# Patient Record
Sex: MALE | Race: WHITE | Employment: OTHER | URBAN - METROPOLITAN AREA
[De-identification: names, ages, dates, MRNs, and addresses within clinical notes are randomized per-mention and may not be internally consistent; named-entity substitution may affect disease eponyms.]

---

## 2017-04-21 ENCOUNTER — HOSPITAL ENCOUNTER (OUTPATIENT)
Dept: PET IMAGING | Age: 82
Discharge: HOME | End: 2017-04-21
Attending: INTERNAL MEDICINE
Payer: MEDICARE

## 2017-04-21 VITALS — HEIGHT: 68 IN | WEIGHT: 173 LBS | BODY MASS INDEX: 26.22 KG/M2

## 2017-04-21 DIAGNOSIS — C79.51 SECONDARY MALIGNANT NEOPLASM OF BONE AND BONE MARROW (HCC): ICD-10-CM

## 2017-04-21 DIAGNOSIS — C79.70 SECONDARY MALIGNANT NEOPLASM OF ADRENAL GLAND (HCC): ICD-10-CM

## 2017-04-21 DIAGNOSIS — C43.71 MALIGNANT MELANOMA OF RIGHT LOWER EXTREMITY INCLUDING HIP (HCC): ICD-10-CM

## 2017-04-21 DIAGNOSIS — C79.52 SECONDARY MALIGNANT NEOPLASM OF BONE AND BONE MARROW (HCC): ICD-10-CM

## 2017-04-21 DIAGNOSIS — C79.89 MALIGNANT NEOPLASM METASTATIC TO OTHER SITE (HCC): ICD-10-CM

## 2017-04-21 DIAGNOSIS — C78.7 SECONDARY MALIGNANT NEOPLASM OF LIVER (HCC): ICD-10-CM

## 2017-04-21 PROCEDURE — A9552 F18 FDG: HCPCS

## 2017-04-21 RX ORDER — SODIUM CHLORIDE 0.9 % (FLUSH) 0.9 %
10 SYRINGE (ML) INJECTION
Status: COMPLETED | OUTPATIENT
Start: 2017-04-21 | End: 2017-04-21

## 2017-04-21 RX ADMIN — Medication 10 ML: at 10:00

## 2017-07-13 ENCOUNTER — OFFICE VISIT (OUTPATIENT)
Dept: INTERNAL MEDICINE CLINIC | Age: 82
End: 2017-07-13

## 2017-07-13 VITALS
HEIGHT: 68 IN | SYSTOLIC BLOOD PRESSURE: 112 MMHG | DIASTOLIC BLOOD PRESSURE: 62 MMHG | WEIGHT: 172 LBS | BODY MASS INDEX: 26.07 KG/M2

## 2017-07-13 DIAGNOSIS — M16.10 ARTHRITIS, HIP: Primary | ICD-10-CM

## 2017-07-13 DIAGNOSIS — I67.1 CEREBRAL ANEURYSM: ICD-10-CM

## 2017-07-13 DIAGNOSIS — R73.9 HYPERGLYCEMIA: ICD-10-CM

## 2017-07-13 DIAGNOSIS — M25.552 LEFT HIP PAIN: ICD-10-CM

## 2017-07-13 DIAGNOSIS — E11.9 DIABETES MELLITUS, STABLE (HCC): ICD-10-CM

## 2017-07-13 PROBLEM — C61 MALIGNANT NEOPLASM OF PROSTATE (HCC): Status: ACTIVE | Noted: 2017-07-13

## 2017-07-13 PROBLEM — K56.609 SMALL BOWEL OBSTRUCTION (HCC): Status: ACTIVE | Noted: 2017-07-13

## 2017-07-13 PROBLEM — E78.1 PURE HYPERGLYCERIDEMIA: Status: ACTIVE | Noted: 2017-07-13

## 2017-07-13 RX ORDER — METHYLPREDNISOLONE ACETATE 40 MG/ML
40 INJECTION, SUSPENSION INTRA-ARTICULAR; INTRALESIONAL; INTRAMUSCULAR; SOFT TISSUE ONCE
Qty: 1 ML | Refills: 0
Start: 2017-07-13 | End: 2017-07-13

## 2017-07-13 RX ORDER — NATEGLINIDE 60 MG/1
60 TABLET ORAL
COMMUNITY
End: 2017-07-19 | Stop reason: SDUPTHER

## 2017-07-13 RX ORDER — NATEGLINIDE 60 MG/1
60 TABLET ORAL
COMMUNITY
End: 2017-09-18 | Stop reason: SDUPTHER

## 2017-07-13 NOTE — MR AVS SNAPSHOT
Visit Information Date & Time Provider Department Dept. Phone Encounter #  
 7/13/2017  9:00 AM GAUTAM Dunne MD [address] [place] [address] [facility] [phone] [id number] Your Appointments 1/10/2018  9:30 AM  
Follow Up with [facility] ([facility]) Appt Note: [address] [phone] [address] [facility] [address] Upcoming Health Maintenance Date Due DTaP/Tdap/Td series (1 - Tdap) 12/13/1952 ZOSTER VACCINE AGE 60> 12/13/1991 GLAUCOMA SCREENING Q2Y 12/13/1996 Pneumococcal 65+ High/Highest Risk (1 of 2 - PCV13) 12/13/1996 MEDICARE YEARLY EXAM 12/13/1996 INFLUENZA AGE 9 TO ADULT 8/1/2017 Allergies as of 7/13/2017  Review Complete On: 7/13/2017 By: Dandre Tinsley MD  
  
 Severity Noted Reaction Type Reactions Relafen [Nabumetone]  03/08/2011   Systemic Itching Current Immunizations  Never Reviewed No immunizations on file. Not reviewed this visit You Were Diagnosed With   
  
 Codes Comments Arthritis, hip    -  Primary ICD-10-CM: M16.10 ICD-9-CM: 716.95 Vitals BP Height(growth percentile) Weight(growth percentile) BMI Smoking Status 112/62 (BP 1 Location: Right arm, BP Patient Position: Sitting) 5' 8\" (1.727 m) 172 lb (78 kg) 26.15 kg/m2 Former Smoker BMI and BSA Data Body Mass Index Body Surface Area  
 26.15 kg/m 2 1.93 m 2 Preferred Pharmacy Pharmacy Name Phone [phone] [phone] Your Updated Medication List  
  
   
This list is accurate as of: 7/13/17 12:09 PM.  Always use your most recent med list.  
  
  
  
  
 amiodarone 200 mg tablet Commonly known as:  CORDARONE Take 200 mg by mouth daily. apixaban 5 mg tablet Commonly known as:  Marianne Pillow Take 5 mg by mouth two (2) times a day. bimatoprost 0.01 % ophthalmic drops Commonly known as:  LUMIGAN Administer 1 Drop to both eyes nightly. cholecalciferol 1,000 unit tablet Commonly known as:  VITAMIN D3 Take 1,000 Units by mouth daily. furosemide 20 mg tablet Commonly known as:  LASIX Take 1 Tab by mouth daily. hydrocortisone 20 mg tablet Commonly known as:  CORTEF Take 1 Tab by mouth two (2) times daily (with meals). lidocaine 5 % Commonly known as:  LIDODERM  
1 Patch by TransDERmal route every twelve (12) hours as needed. methylPREDNISolone acetate 40 mg/mL injection Commonly known as:  DEPO-Medrol 1 mL by IntraMUSCular route once for 1 dose. spironolactone 50 mg tablet Commonly known as:  ALDACTONE Take 1 Tab by mouth daily. * STARLIX 60 mg tablet Generic drug:  nateglinide Take 60 mg by mouth Before breakfast, lunch, and dinner. * nateglinide 60 mg tablet Commonly known as:  Naman Debar Take 60 mg by mouth Before breakfast, lunch, and dinner. traMADol 50 mg tablet Commonly known as:  ULTRAM  
Take 50 mg by mouth every six (6) hours as needed. TYLENOL EXTRA STRENGTH 500 mg tablet Generic drug:  acetaminophen Take 500 mg by mouth every six (6) hours as needed for Pain. * Notice: This list has 2 medication(s) that are the same as other medications prescribed for you. Read the directions carefully, and ask your doctor or other care provider to review them with you. We Performed the Following METHYLPREDNISOLONE ACETATE INJECTION 40 MG [ [facility]] Comments:  
 Submit quantity per 40 mg injection CA DRAIN/INJECT LARGE JOINT/BURSA [id number] CPT(R)] To-Do List   
 08/04/2017 9:00 AM  
  Appointment with [address] PET 1 at [facility] RAD PET ([phone]) 1. Patient should arrive 30 minutes early to register.   Patient's entire visit to the hospital will last about 2 Hours. 2.  Eat a low carb/high protein diet the evening before your procedure. Stay away from food and drink that contains sugars the night before and ESPECIALLY the day of the test. 3.  Do Not eat 4 hours prior to your procedure. The patient may drink all the water they want, up until the time of their appointment. Encourage patient to be well hydrated. Coffee is ok, as long as an artificial sweetener is used instead of sugar. 4. Take meds with water or saltine crackers if food required. 5.  Do not exercise the morning of your procedure. 6.  If you take insulin, it must be taken at least 4 hours before your procedure. 7.  You should bring medications for pain, anxiety, or claustrophobia if you need them. If you take medications for anxiety or claustrophobia, a ride needs to come with you. 8.  Materials for this procedure are ordered in advance and are time-sensitive. If you need to cancel or reschedule, you must call [phone] at least 48 hours prior to your appointment time. 9.  Bring recent CT scan results from other facilities with you. Please have patients report to:  [facility]: ER Registration SFM: [address] [facility], Radiation/Oncology Department (left of the fireplace) MRM: OP Registration MOB 1. Introducing [facility]! [employer] introduces [employer] patient portal. Now you can access parts of your medical record, email your doctor's office, and request medication refills online. 1. In your internet browser, go to [email or web address] 2. Click on the First Time User? Click Here link in the Sign In box. You will see the New Member Sign Up page. 3. Enter your We[employer] Access Code exactly as it appears below. You will not need to use this code after youve completed the sign-up process. If you do not sign up before the expiration date, you must request a new code. · [employer] Access Code: [email or web address] Expires: 10/11/2017  9:06 AM 
 
 4. Enter the last four digits of your Social Security Number (xxxx) and Date of Birth (mm/dd/yyyy) as indicated and click Submit. You will be taken to the next sign-up page. 5. Create a TheLocker ID. This will be your TheLocker login ID and cannot be changed, so think of one that is secure and easy to remember. 6. Create a TheLocker password. You can change your password at any time. 7. Enter your Password Reset Question and Answer. This can be used at a later time if you forget your password. 8. Enter your e-mail address. You will receive e-mail notification when new information is available in [address] 9. Click Sign Up. You can now view and download portions of your medical record. 10. Click the Download Summary menu link to download a portable copy of your medical information. If you have questions, please visit the Frequently Asked Questions section of the TheLocker website. Remember, TheLocker is NOT to be used for urgent needs. For medical emergencies, dial 911. Now available from your iPhone and Android! Please provide this summary of care documentation to your next provider. Your primary care clinician is listed as GAUTAM Wise. If you have any questions after today's visit, please call [phone].

## 2017-07-13 NOTE — PROGRESS NOTES
No refill at this time. Reviewed record in preparation for visit and have obtained necessary documentation. Identified pt with two pt identifiers(name and ). Health Maintenance Due   Topic    DTaP/Tdap/Td series (1 - Tdap)    ZOSTER VACCINE AGE 60>     GLAUCOMA SCREENING Q2Y     Pneumococcal 65+ High/Highest Risk (1 of 2 - PCV13)    MEDICARE YEARLY EXAM          Chief Complaint   Patient presents with    Hip Pain          Learning Assessment:  :     Learning Assessment 2017   PRIMARY LEARNER Patient   BARRIERS PRIMARY LEARNER NONE   CO-LEARNER CAREGIVER No   PRIMARY LANGUAGE ENGLISH   LEARNER PREFERENCE PRIMARY READING   ANSWERED BY patent   RELATIONSHIP SELF       Depression Screening:  :     PHQ over the last two weeks 2017   Little interest or pleasure in doing things Not at all   Feeling down, depressed or hopeless Not at all   Total Score PHQ 2 0       Fall Risk Assessment:  :     Fall Risk Assessment, last 12 mths 2017   Able to walk? Yes   Fall in past 12 months? No       Abuse Screening:  :     No flowsheet data found. Coordination of Care Questionnaire:  :     1) Have you been to an emergency room, urgent care clinic since your last visit? no   Hospitalized since your last visit? no             2) Have you seen or consulted any other health care providers outside of [address] [place] [address] since your last visit? no  (Include any pap smears or colon screenings in this section.)    3) Do you have an Advance Directive on file? yes    4) Are you interested in receiving information on Advance Directives? NO      Patient is accompanied by spouse I have received verbal consent from Lynann Lesch to discuss any/all medical information while they are present in the room. C/o hip pain over the last 4 days.  Hurts to walk and sit

## 2017-07-13 NOTE — PROGRESS NOTES
Aliya Murphy is a 80 y.o. male and presents with Hip Pain  . Subjective:  Mr. Reymundo Myrick notes onset of hip pain on Monday (4 days now). The pain is constant and rated as a 10 out of 10. It is unrelieved with Tramadol. He notes no history of fall or injury. He denies pain radiating down leg. The pain is worse with movement but present also at rest. It is also exacerbated when lying down on his back but not his side. Review of Systems  Constitutional: negative for fevers, chills, anorexia and weight loss  Eyes:   negative for visual disturbance and irritation  ENT:   negative for tinnitus,sore throat,nasal congestion,ear pains. hoarseness  Respiratory:  negative for cough, hemoptysis, dyspnea,wheezing  CV:   negative for chest pain, palpitations, lower extremity edema  GI:   negative for nausea, vomiting, diarrhea, abdominal pain,melena  Endo:               negative for polyuria,polydipsia,polyphagia,heat intolerance  Genitourinary: negative for frequency, dysuria and hematuria  Integumentary: negative for rash and pruritus  Hematologic:  negative for easy bruising and gum/nose bleeding  Musculoskel: negative for myalgias, arthralgias, back pain, muscle weakness  Neurological:  negative for headaches, dizziness, vertigo, memory problems and gait   Behavl/Psych: negative for feelings of anxiety, depression, mood changes    Past Medical History:   Diagnosis Date    Aneurysm ([address] 10/1/14    BRAIN RECENT DX , followed by dr. hodges, radiologist    Atrial flutter [facility])     s/p cardioversion    Cancer ([address] 2013    LEFT FOOT ,MELANOMA    Cancer ([address] [place] ([address] 1988    PROSTATE  TREATED WITH RADIATION    Chronic pain     BACK AND RIGHT HIP    Diabetes mellitus, stable ([address] 7/13/2017    Steroid induced    Hyperglycemia 7/13/2017    Steroid induced: follow up A1C, BMP, discontinue Starlix in wnl    LBP (low back pain)     Left hip pain 7/13/2017    Xray shows no obvious Fx or Metastatic lesions, this pain could be arthritic in nature or radicular, will give a corticosteroid injection, if no benefit may need Ortho eval    Malignant neoplasm of prostate ([address] 7/13/2017    OA (osteoarthritis)     Prostate cancer ([address]     Pure hyperglyceridemia 7/13/2017    Renal cell cancer (right)     Small bowel obstruction ([address] 7/13/2017     Past Surgical History:   Procedure Laterality Date    CARDIAC SURG PROCEDURE UNLIST  1995    MITRAL VALVE REPAIR    HX APPENDECTOMY  1960    HX CATARACT REMOVAL Bilateral 2011    HX LUMBAR LAMINECTOMY  1990    HX ORTHOPAEDIC      CARPAL TUNNEL-SERAFIN    HX OTHER SURGICAL  OCT 2014    MELANOMA REMOVED FROM HEEL    HX PROSTATECTOMY  1988    TURP    HX UROLOGICAL Right     NEPHRECTOMY    VT COLSC FLX W/REMOVAL LESION BY HOT BX FORCEPS  9/24/2012         [address]  9/21/2012          Social History     Social History    Marital status:      Spouse name: N/A    Number of children: N/A    Years of education: N/A     Social History Main Topics    Smoking status: Former Smoker     Packs/day: 0.25     Years: 2.00     Quit date: 4/18/1953    Smokeless tobacco: Never Used    Alcohol use No    Drug use: No    Sexual activity: Not Asked     Other Topics Concern    None     Social History Narrative     Family History   Problem Relation Age of Onset    Diabetes Father     Dementia Mother     Cancer Sister      BRAIN    Heart Disease Brother     Arthritis-osteo Sister     Anesth Problems Neg Hx      Current Outpatient Prescriptions   Medication Sig Dispense Refill    methylPREDNISolone acetate (DEPO-MEDROL) 40 mg/mL injection 1 mL by IntraMUSCular route once for 1 dose. 1 mL 0    furosemide (LASIX) 20 mg tablet Take 1 Tab by mouth daily. (Patient taking differently: Take 20 mg by mouth BID Mon Wed & Fri.) 30 Tab 0    hydrocortisone (CORTEF) 20 mg tablet Take 1 Tab by mouth two (2) times daily (with meals).  60 Tab 0    bimatoprost (LUMIGAN) 0.01 % ophthalmic drops Administer 1 Drop to both eyes nightly.  cholecalciferol (VITAMIN D3) 1,000 unit tablet Take 1,000 Units by mouth daily.  apixaban (ELIQUIS) 5 mg tablet Take 5 mg by mouth two (2) times a day.  acetaminophen (TYLENOL EXTRA STRENGTH) 500 mg tablet Take 500 mg by mouth every six (6) hours as needed for Pain.  amiodarone (CORDARONE) 200 mg tablet Take 200 mg by mouth daily.  traMADol (ULTRAM) 50 mg tablet Take 50 mg by mouth every six (6) hours as needed.  lidocaine (LIDODERM) 5 %(700 mg/patch) 1 Patch by TransDERmal route every twelve (12) hours as needed.  nateglinide (STARLIX) 60 mg tablet Take 60 mg by mouth Before breakfast, lunch, and dinner.  nateglinide (STARLIX) 60 mg tablet Take 60 mg by mouth Before breakfast, lunch, and dinner.  spironolactone (ALDACTONE) 50 mg tablet Take 1 Tab by mouth daily. 30 Tab 0     Allergies   Allergen Reactions    Relafen [Nabumetone] Itching       Objective:  Visit Vitals    /62 (BP 1 Location: Right arm, BP Patient Position: Sitting)    Ht 5' 8\" (1.727 m)    Wt 172 lb (78 kg)    BMI 26.15 kg/m2     Physical Exam:   General appearance - alert, well appearing, and in no distress  Mental status - alert, oriented to person, place, and time  EYE-JASMINE, EOMI, fundi normal, corneas normal, no foreign bodies  ENT-ENT exam normal, no neck nodes or sinus tenderness  Nose - normal and patent, no erythema, discharge or polyps  Mouth - mucous membranes moist, pharynx normal without lesions  Neck - supple, no significant adenopathy   Chest - clear to auscultation, no wheezes, rales or rhonchi, symmetric air entry   Heart - normal rate, regular rhythm, normal S1, S2, no murmurs, rubs, clicks or gallops   Abdomen - soft, nontender, nondistended, no masses or organomegaly  Lymph- no adenopathy palpable  Ext-peripheral pulses normal, no pedal edema, no clubbing or cyanosis  Skin-Warm and dry. no hyperpigmentation, vitiligo, or suspicious lesions  Neuro -alert, oriented, normal speech, no focal findings or movement disorder noted  Musculoskeletal-  no bony abnormalities, point tenderness left hip, decrease flexion and rotation left hip secondary to pain    No results found for this visit on 07/13/17. Assessment/Plan:  Indications:   Symptom relief from osteoarthritis    Procedure:  After consent was obtained, using sterile technique the left hip joint was prepped using Betadine. Medrol 40 mg was mixed with 1% lidocaine 2 ml  and injected into the joint and the needle withdrawn. The procedure was well tolerated. The patient is asked to continue to rest the joint for a few more days before resuming regular activities. It may be more painful for the first 1-2 days. Watch for fever, or increased swelling or persistent pain in the joint. Call or return to clinic prn if such symptoms occur or there is failure to improve as anticipated. There are no diagnoses linked to this encounter. Problem List Items Addressed This Visit     None      Visit Diagnoses     Arthritis, hip    -  Primary    Relevant Orders    METHYLPREDNISOLONE ACETATE INJECTION 40 MG    AL DRAIN/INJECT LARGE JOINT/BURSA          There are no Patient Instructions on file for this visit. Follow-up Disposition:  Return for as scheduled. I have reviewed with the patient details of the assessment and plan and all questions were answered. Relevent patient education was performed. The most recent lab findings were reviewed with the patient. An After Visit Summary was printed and given to the patient.       Kristian Mak MD

## 2017-07-19 ENCOUNTER — OFFICE VISIT (OUTPATIENT)
Dept: INTERNAL MEDICINE CLINIC | Age: 82
End: 2017-07-19

## 2017-07-19 VITALS
WEIGHT: 171 LBS | HEIGHT: 68 IN | BODY MASS INDEX: 25.91 KG/M2 | SYSTOLIC BLOOD PRESSURE: 140 MMHG | DIASTOLIC BLOOD PRESSURE: 78 MMHG

## 2017-07-19 DIAGNOSIS — M25.552 LEFT HIP PAIN: Primary | ICD-10-CM

## 2017-07-19 RX ORDER — TRAMADOL HYDROCHLORIDE 50 MG/1
50 TABLET ORAL
Qty: 120 TAB | Refills: 0 | Status: SHIPPED | OUTPATIENT
Start: 2017-07-19 | End: 2018-10-31

## 2017-07-19 RX ORDER — HYDROCORTISONE 10 MG/1
TABLET ORAL
Refills: 10 | COMMUNITY
Start: 2017-06-22 | End: 2017-07-19 | Stop reason: DRUGHIGH

## 2017-07-19 RX ORDER — DICLOFENAC SODIUM 10 MG/G
2 GEL TOPICAL
Qty: 100 G | Refills: 0 | Status: SHIPPED | OUTPATIENT
Start: 2017-07-19 | End: 2019-01-01

## 2017-07-19 RX ORDER — FLUORIDE (SODIUM) 1.1 %
PASTE (ML) DENTAL
Refills: 12 | COMMUNITY
Start: 2017-06-09

## 2017-07-19 NOTE — MR AVS SNAPSHOT
Visit Information Date & Time Provider Department Dept. Phone Encounter #  
 7/19/2017  2:40 PM GAUTAM Austin MD [facility] [id number] Your Appointments 1/10/2018  9:30 AM  
Follow Up with MD TIM Cassidy [facility] ([address]) Appt Note: [address] [address] [address] [facility] [phone] Upcoming Health Maintenance Date Due HEMOGLOBIN A1C Q6M 12/13/1931 LIPID PANEL Q1 12/13/1931 FOOT EXAM Q1 12/13/1941 MICROALBUMIN Q1 12/13/1941 EYE EXAM RETINAL OR DILATED Q1 12/13/1941 DTaP/Tdap/Td series (1 - Tdap) 12/13/1952 ZOSTER VACCINE AGE 60> 12/13/1991 GLAUCOMA SCREENING Q2Y 12/13/1996 Pneumococcal 65+ High/Highest Risk (1 of 2 - PCV13) 12/13/1996 MEDICARE YEARLY EXAM 12/13/1996 INFLUENZA AGE 9 TO ADULT 8/1/2017 Allergies as of 7/19/2017  Review Complete On: 7/19/2017 By: Leonila العلي MD  
  
 Severity Noted Reaction Type Reactions Relafen [Nabumetone]  03/08/2011   Systemic Itching Current Immunizations  Never Reviewed No immunizations on file. Not reviewed this visit You Were Diagnosed With   
  
 Codes Comments Left hip pain    -  Primary ICD-10-CM: G27.448 ICD-9-CM: 719.45 Vitals BP Height(growth percentile) Weight(growth percentile) BMI Smoking Status 140/78 (BP 1 Location: Left arm, BP Patient Position: Sitting) 5' 8\" (1.727 m) 171 lb (77.6 kg) 26 kg/m2 Former Smoker Vitals History BMI and BSA Data Body Mass Index Body Surface Area  
 26 kg/m 2 1.93 m 2 Preferred Pharmacy Pharmacy Name Phone [phone] [phone] Your Updated Medication List  
  
   
 This list is accurate as of: 7/19/17  4:06 PM.  Always use your most recent med list.  
  
  
  
  
 amiodarone 200 mg tablet Commonly known as:  CORDARONE Take 200 mg by mouth daily. apixaban 5 mg tablet Commonly known as:  Marianne Pillow Take 5 mg by mouth two (2) times a day. bimatoprost 0.01 % ophthalmic drops Commonly known as:  LUMIGAN Administer 1 Drop to both eyes nightly. cholecalciferol 1,000 unit tablet Commonly known as:  VITAMIN D3 Take 1,000 Units by mouth daily. diclofenac 1 % Gel Commonly known as:  VOLTAREN Apply 2 g to affected area four (4) times daily as needed. hydrocortisone 20 mg tablet Commonly known as:  CORTEF Take 1 Tab by mouth two (2) times daily (with meals). lidocaine 5 % Commonly known as:  LIDODERM  
1 Patch by TransDERmal route every twelve (12) hours as needed. nateglinide 60 mg tablet Commonly known as:  Naman Debar Take 60 mg by mouth Before breakfast, lunch, and dinner. PREVIDENT 5000 BOOSTER PLUS 1.1 % Pste Generic drug:  sodium fluoride BRUSH AFTER BREAKFAST AND SUPPER CAN ALSO USE IN FLUORIDE TRAY  
  
 spironolactone 50 mg tablet Commonly known as:  ALDACTONE Take 1 Tab by mouth daily. traMADol 50 mg tablet Commonly known as:  ULTRAM  
Take 1 Tab by mouth every six (6) hours as needed. Max Daily Amount: 200 mg.  
  
 TYLENOL EXTRA STRENGTH 500 mg tablet Generic drug:  acetaminophen Take 500 mg by mouth every six (6) hours as needed for Pain. Prescriptions Printed Refills  
 traMADol (ULTRAM) 50 mg tablet 0 Sig: Take 1 Tab by mouth every six (6) hours as needed. Max Daily Amount: 200 mg. Class: Print Route: Oral  
  
Prescriptions Sent to Pharmacy Refills  
 diclofenac (VOLTAREN) 1 % gel 0 Sig: Apply 2 g to affected area four (4) times daily as needed.   
 Class: Normal  
 Pharmacy: [facility] #[id number]- [address], [address] [place] [address] Sabine GAITAN AT [facility] #: [phone] Route: Topical  
  
We Performed the Following REFERRAL TO ORTHOPEDICS [BSQ935 Custom] Comments:  
 Please evaluate patient for left hip pain. To-Do List   
 07/19/2017 Imaging:  XR HIP LT W OR WO PELV 2-3 VWS   
  
 08/04/2017 9:00 AM  
  Appointment with [facility] PET 1 at [facility] RAD PET ([phone]) 1. Patient should arrive 30 minutes early to register. Patient's entire visit to the hospital will last about 2 Hours. 2.  Eat a low carb/high protein diet the evening before your procedure. Stay away from food and drink that contains sugars the night before and ESPECIALLY the day of the test. 3.  Do Not eat 4 hours prior to your procedure. The patient may drink all the water they want, up until the time of their appointment. Encourage patient to be well hydrated. Coffee is ok, as long as an artificial sweetener is used instead of sugar. 4. Take meds with water or saltine crackers if food required. 5.  Do not exercise the morning of your procedure. 6.  If you take insulin, it must be taken at least 4 hours before your procedure. 7.  You should bring medications for pain, anxiety, or claustrophobia if you need them. If you take medications for anxiety or claustrophobia, a ride needs to come with you. 8.  Materials for this procedure are ordered in advance and are time-sensitive. If you need to cancel or reschedule, you must call [phone] at least 48 hours prior to your appointment time. 9.  Bring recent CT scan results from other facilities with you. Please have patients report to:  [facility]: ER Registration SFM: [address] [facility], Radiation/Oncology Department (left of the fireplace) MRM: OP Registration MOB 1. Referral Information Referral ID Referred By Referred To  
  
 [id number] [facility] [address] [place], [address] Phone: [phone] Visits Status Start Date End Date 1 New Request 7/19/17 7/19/18 If your referral has a status of pending review or denied, additional information will be sent to support the outcome of this decision. Introducing [facility] & [facility] SERVICES! [employer] introduces [employer] patient portal. Now you can access parts of your medical record, email your doctor's office, and request medication refills online. 1. In your internet browser, go to [email or web address] 2. Click on the First Time User? Click Here link in the Sign In box. You will see the New Member Sign Up page. 3. Enter your [employer] Access Code exactly as it appears below. You will not need to use this code after youve completed the sign-up process. If you do not sign up before the expiration date, you must request a new code. · [employer] Access Code: [email or web address] Expires: 10/11/2017  9:06 AM 
 
4. Enter the last four digits of your Social Security Number (xxxx) and Date of Birth (mm/dd/yyyy) as indicated and click Submit. You will be taken to the next sign-up page. 5. Create a [employer] ID. This will be your [employer] login ID and cannot be changed, so think of one that is secure and easy to remember. 6. Create a [employer] password. You can change your password at any time. 7. Enter your Password Reset Question and Answer. This can be used at a later time if you forget your password. 8. Enter your e-mail address. You will receive e-mail notification when new information is available in [address] 9. Click Sign Up. You can now view and download portions of your medical record. 10. Click the Download Summary menu link to download a portable copy of your medical information. If you have questions, please visit the Frequently Asked Questions section of the [employer] website. Remember, [employer] is NOT to be used for urgent needs. For medical emergencies, dial 911. Now available from your iPhone and Android! Please provide this summary of care documentation to your next provider. Your primary care clinician is listed as GAUTAM Torres. If you have any questions after today's visit, please call [phone].

## 2017-07-19 NOTE — PROGRESS NOTES
Tammy Huerta is a 80 y.o. male    Chief Complaint   Patient presents with    Hip Pain     Left Hip pain. Pt stated that he is taking Tramadol for pain. 1. Have you been to the ER, urgent care clinic since your last visit? Hospitalized since your last visit? No    2. Have you seen or consulted any other health care providers outside of the [address] [place] [address] since your last visit? Include any pap smears or colon screening.  No

## 2017-07-19 NOTE — PROGRESS NOTES
Isabelle Meza is a 80 y.o. male and presents with Hip Pain (Left Hip pain. Pt stated that he is taking Tramadol for pain.)  . Subjective:    Mr. Lauren Morrison presents today with complaint of continued left hip pain is in the office several days ago for left hip injection which did not seem to alleviate his symptoms. His history is significant for melanoma. He takes tramadol for the pain is increased the dose to 2 tablets every 6 hours as needed. Pain is worse when standing. He ambulates with a rolling walker. Review of Systems  Constitutional:   Eyes:   negative for visual disturbance and irritation  ENT:   negative for tinnitus,sore throat,nasal congestion,ear pains. hoarseness  Respiratory:  negative for cough, hemoptysis, dyspnea,wheezing  CV:   negative for chest pain, palpitations, lower extremity edema  GI:   negative for nausea, vomiting, diarrhea, abdominal pain,melena  Endo:               negative for polyuria,polydipsia,polyphagia,heat intolerance  Genitourinary: negative for frequency, dysuria and hematuria  Integumentary: negative for rash and pruritus  Hematologic:  negative for easy bruising and gum/nose bleeding  Musculoskel: negative for myalgias, arthralgias, back pain, muscle weakness  Neurological:  negative for headaches, dizziness, vertigo, memory problems and gait   Behavl/Psych: negative for feelings of anxiety, depression, mood changes    Past Medical History:   Diagnosis Date    Aneurysm ([address] 10/1/14    BRAIN RECENT DX , followed by dr. hodges, radiologist    Atrial flutter [facility])     s/p cardioversion    Cancer ([address] 2013    LEFT FOOT ,MELANOMA    Cancer ([address] [facility] ([address] 1988    PROSTATE  TREATED WITH RADIATION    Chronic pain     BACK AND RIGHT HIP    Diabetes mellitus, stable ([address] 7/13/2017    Steroid induced    Hyperglycemia 7/13/2017    Steroid induced: follow up A1C, BMP, discontinue Starlix in wnl    LBP (low back pain)     Left hip pain 7/13/2017    Xray shows no obvious Fx or Metastatic lesions, this pain could be arthritic in nature or radicular, will give a corticosteroid injection, if no benefit may need Ortho eval    Malignant neoplasm of prostate ([address] 7/13/2017    OA (osteoarthritis)     Prostate cancer ([address]     Pure hyperglyceridemia 7/13/2017    Renal cell cancer (right)     Small bowel obstruction ([address] 7/13/2017     Past Surgical History:   Procedure Laterality Date    CARDIAC SURG PROCEDURE UNLIST  1995    MITRAL VALVE REPAIR    HX APPENDECTOMY  1960    HX CATARACT REMOVAL Bilateral 2011    HX LUMBAR LAMINECTOMY  1990    HX ORTHOPAEDIC      CARPAL TUNNEL-SERAFIN    HX OTHER SURGICAL  OCT 2014    MELANOMA REMOVED FROM HEEL    HX PROSTATECTOMY  1988    TURP    HX UROLOGICAL Right     NEPHRECTOMY    MT COLSC FLX W/REMOVAL LESION BY HOT BX FORCEPS  9/24/2012         [address]  9/21/2012          Social History     Social History    Marital status:      Spouse name: N/A    Number of children: N/A    Years of education: N/A     Social History Main Topics    Smoking status: Former Smoker     Packs/day: 0.25     Years: 2.00     Quit date: 4/18/1953    Smokeless tobacco: Never Used    Alcohol use No    Drug use: No    Sexual activity: Not Asked     Other Topics Concern    None     Social History Narrative     Family History   Problem Relation Age of Onset    Diabetes Father     Dementia Mother     Cancer Sister      BRAIN    Heart Disease Brother     Arthritis-osteo Sister     Anesth Problems Neg Hx      Current Outpatient Prescriptions   Medication Sig Dispense Refill    PREVIDENT 5000 BOOSTER PLUS 1.1 % pste BRUSH AFTER BREAKFAST AND SUPPER CAN ALSO USE IN FLUORIDE TRAY  12    traMADol (ULTRAM) 50 mg tablet Take 1 Tab by mouth every six (6) hours as needed. Max Daily Amount: 200 mg. 120 Tab 0    diclofenac (VOLTAREN) 1 % gel Apply 2 g to affected area four (4) times daily as needed.  100 g 0  nateglinide (STARLIX) 60 mg tablet Take 60 mg by mouth Before breakfast, lunch, and dinner.  hydrocortisone (CORTEF) 20 mg tablet Take 1 Tab by mouth two (2) times daily (with meals). 60 Tab 0    spironolactone (ALDACTONE) 50 mg tablet Take 1 Tab by mouth daily. 30 Tab 0    bimatoprost (LUMIGAN) 0.01 % ophthalmic drops Administer 1 Drop to both eyes nightly.  cholecalciferol (VITAMIN D3) 1,000 unit tablet Take 1,000 Units by mouth daily.  apixaban (ELIQUIS) 5 mg tablet Take 5 mg by mouth two (2) times a day.  acetaminophen (TYLENOL EXTRA STRENGTH) 500 mg tablet Take 500 mg by mouth every six (6) hours as needed for Pain.  amiodarone (CORDARONE) 200 mg tablet Take 200 mg by mouth daily.  lidocaine (LIDODERM) 5 %(700 mg/patch) 1 Patch by TransDERmal route every twelve (12) hours as needed. Allergies   Allergen Reactions    Relafen [Nabumetone] Itching       Objective:  Visit Vitals    /78 (BP 1 Location: Left arm, BP Patient Position: Sitting)    Ht 5' 8\" (1.727 m)    Wt 171 lb (77.6 kg)    BMI 26 kg/m2     Physical Exam:   General appearance - alert, well appearing, and in no distress  Mental status - alert, oriented to person, place, and time  EYE-JASMINE, EOMI, fundi normal, corneas normal, no foreign bodies  ENT-ENT exam normal, no neck nodes or sinus tenderness  Nose - normal and patent, no erythema, discharge or polyps  Mouth - mucous membranes moist, pharynx normal without lesions  Neck - supple, no significant adenopathy   Chest - clear to auscultation, no wheezes, rales or rhonchi, symmetric air entry   Heart - normal rate, regular rhythm, normal S1, S2, no murmurs, rubs, clicks or gallops   Abdomen - soft, nontender, nondistended, no masses or organomegaly  Lymph- no adenopathy palpable  Ext-peripheral pulses normal, no pedal edema, no clubbing or cyanosis  Skin-Warm and dry.  no hyperpigmentation, vitiligo, or suspicious lesions  Neuro -alert, oriented, normal speech, no focal findings or movement disorder noted  Musculoskeletal-left hip does demonstrate some point tenderness no obvious bony abnormality no warmth or calor. No results found for this visit on 07/19/17. All results for lab orders may not have been returned by the time this encountered was closed. Assessment/Plan:     Orders Placed This Encounter    XR HIP LT W OR WO PELV 2-3 VWS     Standing Status:   Future     Number of Occurrences:   1     Standing Expiration Date:   7/20/2018     Order Specific Question:   Reason for Exam     Answer:   left hip pain    REFERRAL TO ORTHOPEDICS     Referral Priority:   Routine     Referral Type:   Consultation     Referral Reason:   Specialty Services Required     Referral Location:   [facility]     Referred to Provider:   Keyona Almeida MD    DISCONTD: hydrocortisone (CORTEF) 10 mg tablet     Sig: TAKE 1 TABLET BY MOUTH TWICE A DAY     Refill:  10    PREVIDENT 5000 BOOSTER PLUS 1.1 % pste     Sig: BRUSH AFTER BREAKFAST AND SUPPER CAN ALSO USE IN FLUORIDE TRAY     Refill:  12    traMADol (ULTRAM) 50 mg tablet     Sig: Take 1 Tab by mouth every six (6) hours as needed. Max Daily Amount: 200 mg. Dispense:  120 Tab     Refill:  0    diclofenac (VOLTAREN) 1 % gel     Sig: Apply 2 g to affected area four (4) times daily as needed. Dispense:  100 g     Refill:  0       Problem List Items Addressed This Visit     Left hip pain - Primary    Relevant Orders    XR HIP LT W OR WO PELV 2-3 VWS (Completed)    REFERRAL TO ORTHOPEDICS       x-ray results did not demonstrate any acute bony abnormality pending final reading by x-ray. The patient was prescribed Voltaren gel for relief of pain. I do not think he will have any appreciable absorption of this medication that might interfere with his Pradaxa. He may continue the tramadol to take as prescribed. He will referred to orthopedics for further evaluation.     There are no Patient Instructions on file for this visit. Follow-up Disposition: Not on File      I have reviewed with the patient details of the assessment and plan and all questions were answered. Relevent patient education was performed. The most recent lab findings were reviewed with the patient. An After Visit Summary was printed and given to the patient.       Luann Mike MD

## 2017-08-04 ENCOUNTER — HOSPITAL ENCOUNTER (OUTPATIENT)
Dept: PET IMAGING | Age: 82
Discharge: HOME | End: 2017-08-04
Attending: INTERNAL MEDICINE
Payer: MEDICARE

## 2017-08-04 VITALS — BODY MASS INDEX: 26.07 KG/M2 | HEIGHT: 68 IN | WEIGHT: 172 LBS

## 2017-08-04 DIAGNOSIS — C79.51 SECONDARY MALIGNANT NEOPLASM OF BONE AND BONE MARROW (HCC): ICD-10-CM

## 2017-08-04 DIAGNOSIS — C79.89 SECONDARY MALIGNANT NEOPLASM OF SOFT TISSUES (HCC): ICD-10-CM

## 2017-08-04 DIAGNOSIS — C78.7 SECONDARY MALIGNANT NEOPLASM OF LIVER (HCC): ICD-10-CM

## 2017-08-04 DIAGNOSIS — C43.71 MALIGNANT MELANOMA OF RIGHT LOWER EXTREMITY INCLUDING HIP (HCC): ICD-10-CM

## 2017-08-04 DIAGNOSIS — C79.70 SECONDARY MALIGNANT NEOPLASM OF ADRENAL GLAND (HCC): ICD-10-CM

## 2017-08-04 DIAGNOSIS — C79.52 SECONDARY MALIGNANT NEOPLASM OF BONE AND BONE MARROW (HCC): ICD-10-CM

## 2017-08-04 PROCEDURE — A9552 F18 FDG: HCPCS

## 2017-08-04 RX ORDER — SODIUM CHLORIDE 0.9 % (FLUSH) 0.9 %
10 SYRINGE (ML) INJECTION
Status: COMPLETED | OUTPATIENT
Start: 2017-08-04 | End: 2017-08-04

## 2017-08-04 RX ADMIN — Medication 10 ML: at 08:59

## 2017-09-18 RX ORDER — NATEGLINIDE 60 MG/1
TABLET ORAL
Qty: 90 TAB | Refills: 4 | Status: SHIPPED | OUTPATIENT
Start: 2017-09-18 | End: 2018-02-05 | Stop reason: SDUPTHER

## 2018-01-01 ENCOUNTER — HOSPITAL ENCOUNTER (OUTPATIENT)
Dept: PET IMAGING | Age: 83
Discharge: HOME | End: 2018-12-07
Attending: INTERNAL MEDICINE
Payer: MEDICARE

## 2018-01-01 ENCOUNTER — OFFICE VISIT (OUTPATIENT)
Dept: INTERNAL MEDICINE CLINIC | Age: 83
End: 2018-01-01

## 2018-01-01 VITALS
OXYGEN SATURATION: 96 % | HEART RATE: 95 BPM | DIASTOLIC BLOOD PRESSURE: 68 MMHG | RESPIRATION RATE: 20 BRPM | TEMPERATURE: 97.7 F | WEIGHT: 180.4 LBS | HEIGHT: 68 IN | SYSTOLIC BLOOD PRESSURE: 111 MMHG | BODY MASS INDEX: 27.34 KG/M2

## 2018-01-01 VITALS — WEIGHT: 178 LBS | BODY MASS INDEX: 26.98 KG/M2 | HEIGHT: 68 IN

## 2018-01-01 DIAGNOSIS — C79.89 SECONDARY MALIGNANT NEOPLASM OF OTHER SPECIFIED SITES (HCC): ICD-10-CM

## 2018-01-01 DIAGNOSIS — I48.0 PAROXYSMAL ATRIAL FIBRILLATION (HCC): Primary | Chronic | ICD-10-CM

## 2018-01-01 DIAGNOSIS — C79.70 SECONDARY MALIGNANT NEOPLASM OF ADRENAL GLAND (HCC): ICD-10-CM

## 2018-01-01 DIAGNOSIS — C78.7 SECONDARY MALIGNANT NEOPLASM OF LIVER AND INTRAHEPATIC BILE DUCT (HCC): ICD-10-CM

## 2018-01-01 DIAGNOSIS — C79.51 SECONDARY MALIGNANT NEOPLASM OF BONE (HCC): ICD-10-CM

## 2018-01-01 DIAGNOSIS — C43.71 MALIGNANT MELANOMA OF RIGHT LOWER EXTREMITY INCLUDING HIP (HCC): ICD-10-CM

## 2018-01-01 PROCEDURE — A9552 F18 FDG: HCPCS

## 2018-01-01 RX ORDER — SODIUM CHLORIDE 0.9 % (FLUSH) 0.9 %
10 SYRINGE (ML) INJECTION
Status: COMPLETED | OUTPATIENT
Start: 2018-01-01 | End: 2018-01-01

## 2018-01-01 RX ORDER — AMIODARONE HYDROCHLORIDE 400 MG/1
400 TABLET ORAL DAILY
Qty: 30 TAB | Refills: 1 | Status: SHIPPED | OUTPATIENT
Start: 2018-01-01 | End: 2018-01-01

## 2018-01-01 RX ORDER — AMIODARONE HYDROCHLORIDE 200 MG/1
TABLET ORAL
Qty: 180 TAB | Refills: 3
Start: 2018-01-01 | End: 2019-01-01 | Stop reason: SDUPTHER

## 2018-01-01 RX ADMIN — Medication 10 ML: at 10:48

## 2018-01-05 ENCOUNTER — HOSPITAL ENCOUNTER (OUTPATIENT)
Dept: PET IMAGING | Age: 83
Discharge: HOME | End: 2018-01-05
Attending: INTERNAL MEDICINE
Payer: MEDICARE

## 2018-01-05 VITALS — WEIGHT: 174 LBS | BODY MASS INDEX: 26.37 KG/M2 | HEIGHT: 68 IN

## 2018-01-05 DIAGNOSIS — C79.51 SECONDARY MALIGNANT NEOPLASM OF BONE AND BONE MARROW (HCC): ICD-10-CM

## 2018-01-05 DIAGNOSIS — C79.89 SECONDARY MALIGNANT NEOPLASM OF OTHER SPECIFIED SITES (CODE): ICD-10-CM

## 2018-01-05 DIAGNOSIS — C79.52 SECONDARY MALIGNANT NEOPLASM OF BONE AND BONE MARROW (HCC): ICD-10-CM

## 2018-01-05 DIAGNOSIS — C78.7 SECONDARY MALIGNANT NEOPLASM OF LIVER (HCC): ICD-10-CM

## 2018-01-05 DIAGNOSIS — C79.70 SECONDARY MALIGNANT NEOPLASM OF ADRENAL GLAND (HCC): ICD-10-CM

## 2018-01-05 DIAGNOSIS — C43.71 MALIGNANT MELANOMA OF RIGHT LOWER EXTREMITY INCLUDING HIP (HCC): ICD-10-CM

## 2018-01-05 PROCEDURE — A9552 F18 FDG: HCPCS

## 2018-01-05 RX ORDER — SODIUM CHLORIDE 0.9 % (FLUSH) 0.9 %
10 SYRINGE (ML) INJECTION
Status: ACTIVE | OUTPATIENT
Start: 2018-01-05 | End: 2018-01-06

## 2018-01-05 RX ORDER — SODIUM CHLORIDE 0.9 % (FLUSH) 0.9 %
10 SYRINGE (ML) INJECTION
Status: COMPLETED | OUTPATIENT
Start: 2018-01-05 | End: 2018-01-05

## 2018-01-05 RX ADMIN — Medication 10 ML: at 12:41

## 2018-01-10 ENCOUNTER — OFFICE VISIT (OUTPATIENT)
Dept: INTERNAL MEDICINE CLINIC | Age: 83
End: 2018-01-10

## 2018-01-10 ENCOUNTER — HOSPITAL ENCOUNTER (OUTPATIENT)
Dept: CT IMAGING | Age: 83
Discharge: HOME | End: 2018-01-10
Attending: INTERNAL MEDICINE
Payer: MEDICARE

## 2018-01-10 VITALS
BODY MASS INDEX: 26.22 KG/M2 | HEIGHT: 68 IN | WEIGHT: 173 LBS | SYSTOLIC BLOOD PRESSURE: 132 MMHG | TEMPERATURE: 98 F | RESPIRATION RATE: 20 BRPM | HEART RATE: 67 BPM | OXYGEN SATURATION: 97 % | DIASTOLIC BLOOD PRESSURE: 71 MMHG

## 2018-01-10 DIAGNOSIS — E27.40 ADRENAL INSUFFICIENCY (HCC): ICD-10-CM

## 2018-01-10 DIAGNOSIS — G56.01 CARPAL TUNNEL SYNDROME OF RIGHT WRIST: ICD-10-CM

## 2018-01-10 DIAGNOSIS — I48.0 PAROXYSMAL ATRIAL FIBRILLATION (HCC): Primary | Chronic | ICD-10-CM

## 2018-01-10 DIAGNOSIS — I67.1 CEREBRAL ANEURYSM, NONRUPTURED: ICD-10-CM

## 2018-01-10 DIAGNOSIS — E11.9 DIABETES MELLITUS, STABLE (HCC): ICD-10-CM

## 2018-01-10 DIAGNOSIS — C43.71 MALIGNANT MELANOMA OF RIGHT LOWER EXTREMITY INCLUDING HIP (HCC): ICD-10-CM

## 2018-01-10 LAB
ALBUMIN SERPL-MCNC: 4.3 G/DL (ref 3.9–5.4)
ALKALINE PHOS POC: 57 U/L (ref 38–126)
ALT SERPL-CCNC: 22 U/L (ref 9–52)
AST SERPL-CCNC: 19 U/L (ref 14–36)
BUN BLD-MCNC: 23 MG/DL (ref 9–20)
CALCIUM BLD-MCNC: 9.3 MG/DL (ref 8.4–10.2)
CHLORIDE BLD-SCNC: 105 MMOL/L (ref 98–107)
CO2 POC: 26 MMOL/L (ref 22–32)
CREAT BLD-MCNC: 1.3 MG/DL (ref 0.8–1.5)
CREAT BLD-MCNC: 1.4 MG/DL (ref 0.6–1.3)
EGFR (POC): 49.4
GLUCOSE POC: 100 MG/DL (ref 75–110)
MICROALBUMIN UR TEST STR-MCNC: NEGATIVE MG/L (ref 0–20)
POTASSIUM SERPL-SCNC: 5 MMOL/L (ref 3.6–5)
PROT SERPL-MCNC: 7.2 G/DL (ref 6.3–8.2)
SODIUM SERPL-SCNC: 143 MMOL/L (ref 137–145)
TOTAL BILIRUBIN POC: 0.8 MG/DL (ref 0.2–1.3)

## 2018-01-10 PROCEDURE — 82565 ASSAY OF CREATININE: CPT

## 2018-01-10 PROCEDURE — 74011250636 HC RX REV CODE- 250/636: Performed by: INTERNAL MEDICINE

## 2018-01-10 PROCEDURE — 70498 CT ANGIOGRAPHY NECK: CPT

## 2018-01-10 PROCEDURE — 74011636320 HC RX REV CODE- 636/320: Performed by: INTERNAL MEDICINE

## 2018-01-10 RX ORDER — FUROSEMIDE 20 MG/1
TABLET ORAL
Refills: 2 | COMMUNITY
Start: 2017-12-12 | End: 2018-11-09 | Stop reason: DRUGHIGH

## 2018-01-10 RX ORDER — SODIUM CHLORIDE 9 MG/ML
50 INJECTION, SOLUTION INTRAVENOUS
Status: COMPLETED | OUTPATIENT
Start: 2018-01-10 | End: 2018-01-10

## 2018-01-10 RX ORDER — SODIUM CHLORIDE 0.9 % (FLUSH) 0.9 %
10 SYRINGE (ML) INJECTION
Status: COMPLETED | OUTPATIENT
Start: 2018-01-10 | End: 2018-01-10

## 2018-01-10 RX ORDER — PREDNISONE 10 MG/1
TABLET ORAL
Qty: 21 TAB | Refills: 0 | Status: SHIPPED | OUTPATIENT
Start: 2018-01-10 | End: 2018-11-07

## 2018-01-10 RX ORDER — HYDROCORTISONE 10 MG/1
TABLET ORAL
Refills: 10 | COMMUNITY
Start: 2017-12-12

## 2018-01-10 RX ORDER — LATANOPROST 50 UG/ML
SOLUTION/ DROPS OPHTHALMIC
Refills: 5 | COMMUNITY
Start: 2017-12-28 | End: 2018-11-08

## 2018-01-10 RX ADMIN — IOPAMIDOL 100 ML: 755 INJECTION, SOLUTION INTRAVENOUS at 15:26

## 2018-01-10 RX ADMIN — SODIUM CHLORIDE 50 ML/HR: 900 INJECTION, SOLUTION INTRAVENOUS at 15:26

## 2018-01-10 RX ADMIN — Medication 10 ML: at 15:26

## 2018-01-10 NOTE — PROGRESS NOTES
Identified pt with two pt identifiers(name and ). Reviewed record in preparation for visit and have obtained necessary documentation. Chief Complaint   Patient presents with    Irregular Heart Beat     6 month follow up; Room 8        Health Maintenance Due   Topic    LIPID PANEL Q1     FOOT EXAM Q1     DTaP/Tdap/Td series (1 - Tdap)    ZOSTER VACCINE AGE 60>     Pneumococcal 65+ High/Highest Risk (1 of 2 - PCV13)    MEDICARE YEARLY EXAM     Influenza Age 5 to Adult     HEMOGLOBIN A1C Q6M        Coordination of Care Questionnaire:  :   1) Have you been to an emergency room, urgent care clinic since your last visit? no   Hospitalized since your last visit? no             2. Have seen or consulted any other health care provider since your last visit? NO  If yes, where when, and reason for visit? 3) Do you have an Advanced Directive/ Living Will in place? YES  If yes, do we have a copy on file YES  If no, would you like information NO    Patient is accompanied by wife I have received verbal consent from Aarti Chacon to discuss any/all medical information while they are present in the room.

## 2018-01-10 NOTE — MR AVS SNAPSHOT
Visit Information Date & Time Provider Department Dept. Phone Encounter #  
 1/10/2018  9:30 AM Liborio Davalos MD [address] [place] [address] [facility] [phone] [id number] Follow-up Instructions Return in about 6 months (around 7/10/2018) for follow up. Upcoming Health Maintenance Date Due  
 LIPID PANEL Q1 12/13/1931 FOOT EXAM Q1 12/13/1941 DTaP/Tdap/Td series (1 - Tdap) 12/13/1952 ZOSTER VACCINE AGE 60> 10/13/1991 Pneumococcal 65+ High/Highest Risk (1 of 2 - PCV13) 12/13/1996 MEDICARE YEARLY EXAM 12/13/1996 Influenza Age 5 to Adult 8/1/2017 HEMOGLOBIN A1C Q6M 12/7/2017 EYE EXAM RETINAL OR DILATED Q1 5/27/2018 MICROALBUMIN Q1 6/7/2018 GLAUCOMA SCREENING Q2Y 5/27/2019 Allergies as of 1/10/2018  Review Complete On: 1/10/2018 By: Bee Olson MD  
  
 Severity Noted Reaction Type Reactions Relafen [Nabumetone]  03/08/2011   Systemic Itching Current Immunizations  Never Reviewed No immunizations on file. Not reviewed this visit You Were Diagnosed With   
  
 Codes Comments Paroxysmal atrial fibrillation (HCC)    -  Primary ICD-10-CM: I48.0 ICD-9-CM: 427.31 Adrenal insufficiency ([facility]     ICD-10-CM: E27.40 ICD-9-CM: 255.41 Diabetes mellitus, stable ([facility]     ICD-10-CM: E11.9 ICD-9-CM: 250.00 Carpal tunnel syndrome of right wrist     ICD-10-CM: G56.01 
ICD-9-CM: 354.0 Vitals BP Pulse Temp Resp Height(growth percentile) Weight(growth percentile) 132/71 (BP 1 Location: Right arm, BP Patient Position: Sitting) 67 98 °F (36.7 °C) (Oral) 20 5' 8\" (1.727 m) 173 lb (78.5 kg) SpO2 BMI Smoking Status 97% 26.3 kg/m2 Former Smoker Vitals History BMI and BSA Data Body Mass Index Body Surface Area  
 26.3 kg/m 2 1.94 m 2 Preferred Pharmacy Pharmacy Name Phone  [id number] [phone], [address] AT [facility] [phone] Your Updated Medication List  
  
   
This list is accurate as of: 1/10/18 10:44 AM.  Always use your most recent med list.  
  
  
  
  
 amiodarone 200 mg tablet Commonly known as:  CORDARONE Take 100 mg by mouth daily. apixaban 5 mg tablet Commonly known as:  Florencia Hamden Take 5 mg by mouth two (2) times a day. bimatoprost 0.01 % ophthalmic drops Commonly known as:  LUMIGAN Administer 1 Drop to both eyes nightly. cholecalciferol 1,000 unit tablet Commonly known as:  VITAMIN D3 Take 1,000 Units by mouth daily. diclofenac 1 % Gel Commonly known as:  VOLTAREN Apply 2 g to affected area four (4) times daily as needed. furosemide 20 mg tablet Commonly known as:  LASIX TAKE 1 TABLET BY MOUTH EVERY OTHER DAY OR AS DIRECTED * hydrocortisone 20 mg tablet Commonly known as:  CORTEF Take 1 Tab by mouth two (2) times daily (with meals). * hydrocortisone 10 mg tablet Commonly known as:  CORTEF  
TAKE 1 TABLET BY MOUTH TWICE A DAY  
  
 latanoprost 0.005 % ophthalmic solution Commonly known as:  XALATAN  
INSTILL 1 DROP BY OPHTHALMIC ROUTE EVERY DAY INTO BOTH EYES AT BEDTIME  
  
 lidocaine 5 % Commonly known as:  LIDODERM  
1 Patch by TransDERmal route every twelve (12) hours as needed. nateglinide 60 mg tablet Commonly known as:  Georgianne Bending TAKE 1 TABLET BY MOUTH 3 TIMES A DAY WITH MEALS  
  
 predniSONE 10 mg tablet Commonly known as:  DELTASONE  
6 tabs day 1, 5 tabs day 2, 4 tabs day 3, 3 tabs day 4, 2 tabs day 5, 1 tab day 6 PREVIDENT 5000 BOOSTER PLUS 1.1 % Pste Generic drug:  fluoride (sodium) BRUSH AFTER BREAKFAST AND SUPPER CAN ALSO USE IN FLUORIDE TRAY  
  
 spironolactone 50 mg tablet Commonly known as:  ALDACTONE Take 1 Tab by mouth daily. traMADol 50 mg tablet Commonly known as:  ULTRAM  
Take 1 Tab by mouth every six (6) hours as needed. Max Daily Amount: 200 mg. TYLENOL EXTRA STRENGTH 500 mg tablet Generic drug:  acetaminophen Take 500 mg by mouth every six (6) hours as needed for Pain. * Notice: This list has 2 medication(s) that are the same as other medications prescribed for you. Read the directions carefully, and ask your doctor or other care provider to review them with you. Prescriptions Sent to Pharmacy Refills  
 predniSONE (DELTASONE) 10 mg tablet 0 Si tabs day 1, 5 tabs day 2, 4 tabs day 3, 3 tabs day 4, 2 tabs day 5, 1 tab day 6 Class: Normal  
 Pharmacy: [address] #: [phone] We Performed the Following AMB POC COMPREHENSIVE METABOLIC PANEL [70152 CPT(R)] AMB POC HEMOGLOBIN A1C [02374 CPT(R)] AMB POC URINALYSIS DIP STICK AUTO W/ MICRO  [58334 CPT(R)] AMB POC URINE, MICROALBUMIN, SEMIQUANT (1 RESULT) [78458 CPT(R)] Follow-up Instructions Return in about 6 months (around 7/10/2018) for follow up. To-Do List   
 01/10/2018 3:30 PM  
  Appointment with [facility] 1 at [facility] ([phone]) CONTRAST STUDY: 1. The patient should not eat solid food four hours before the appointment but should be encouraged to drink clear liquids. 2. The patient will require IV access for contrast administration. 3. The patient should not take  Ibuprofen (Advil, Motrin, etc.) and Naproxen Sodium (Aleve, etc.)  on the day of the exam. Stopping non-steroidal anti-inflammatory agents (NSAIDs) like Ibuprofen decreases the risk of kidney damage from the x-ray contrast (dye). 4. Bring any non [facility] facility films/images pertaining to the area of interest with you on the day of appointment. 5. Bring current lab work if available(within last 90 days [facility]) ***If scheduled at [facility] is not available, labs will need to be done before appointment*** 6.  Check in at registration at least 30 minutes before appt time unless you were instructed to do otherwise. Introducing [facility]! [employer] introduces [employer] patient portal. Now you can access parts of your medical record, email your doctor's office, and request medication refills online. 1. In your internet browser, go to [email or web address] 2. Click on the First Time User? Click Here link in the Sign In box. You will see the New Member Sign Up page. 3. Enter your [employer] Access Code exactly as it appears below. You will not need to use this code after youve completed the sign-up process. If you do not sign up before the expiration date, you must request a new code. · [employer] Access Code: [email or web address] Expires: 3/11/2018  1:53 PM 
 
4. Enter the last four digits of your Social Security Number (xxxx) and Date of Birth (mm/dd/yyyy) as indicated and click Submit. You will be taken to the next sign-up page. 5. Create a [employer] ID. This will be your [employer] login ID and cannot be changed, so think of one that is secure and easy to remember. 6. Create a [employer] password. You can change your password at any time. 7. Enter your Password Reset Question and Answer. This can be used at a later time if you forget your password. 8. Enter your e-mail address. You will receive e-mail notification when new information is available in [address] 9. Click Sign Up. You can now view and download portions of your medical record. 10. Click the Download Summary menu link to download a portable copy of your medical information. If you have questions, please visit the Frequently Asked Questions section of the [employer] website. Remember, [employer] is NOT to be used for urgent needs. For medical emergencies, dial 911. Now available from your iPhone and Android! Please provide this summary of care documentation to your next provider. Your primary care clinician is listed as GAUTAM Fam. If you have any questions after today's visit, please call [phone].

## 2018-01-11 LAB
BACTERIA UA POCT, BACTPOCT: NORMAL
BILIRUB UR QL STRIP: NEGATIVE
CASTS UA POCT: NORMAL
CLUE CELLS, CLUEPOCT: NEGATIVE
CRYSTALS UA POCT, CRYSPOCT: NEGATIVE
EPITHELIAL CELLS POCT: NEGATIVE
GLUCOSE UR-MCNC: NEGATIVE MG/DL
HBA1C MFR BLD HPLC: 6 % (ref 4.5–5.7)
KETONES P FAST UR STRIP-MCNC: NEGATIVE MG/DL
MUCUS UA POCT, MUCPOCT: NORMAL
PH UR STRIP: 5 [PH] (ref 5–7)
PROT UR QL STRIP: NEGATIVE
RBC UA POCT, RBCPOCT: 0
SP GR UR STRIP: 1.01 (ref 1.01–1.02)
TRICH UA POCT, TRICHPOC: NEGATIVE
UA UROBILINOGEN AMB POC: NORMAL (ref 0.2–1)
URINALYSIS CLARITY POC: CLEAR
URINALYSIS COLOR POC: NORMAL
URINE BLOOD POC: NEGATIVE
URINE CULT COMMENT, POCT: NORMAL
URINE LEUKOCYTES POC: NEGATIVE
URINE NITRITES POC: NEGATIVE
WBC UA POCT, WBCPOCT: 0
YEAST UA POCT, YEASTPOC: NEGATIVE

## 2018-01-19 ENCOUNTER — OFFICE VISIT (OUTPATIENT)
Dept: NEUROSURGERY | Age: 83
End: 2018-01-19

## 2018-01-19 VITALS
BODY MASS INDEX: 24.36 KG/M2 | WEIGHT: 174 LBS | HEART RATE: 71 BPM | TEMPERATURE: 97.7 F | OXYGEN SATURATION: 96 % | SYSTOLIC BLOOD PRESSURE: 124 MMHG | HEIGHT: 71 IN | RESPIRATION RATE: 18 BRPM | DIASTOLIC BLOOD PRESSURE: 75 MMHG

## 2018-01-19 DIAGNOSIS — I67.1 MIDDLE CEREBRAL ARTERY ANEURYSM: Primary | ICD-10-CM

## 2018-01-19 NOTE — MR AVS SNAPSHOT
[address] [address] [address] [place] [address] 
[phone] Patient: Riya Rocha MRN: [id number] DDP:[id number] Visit Information Date & Time Provider Department Dept. Phone Encounter #  
 1/19/2018 10:00 AM Manuel Mcintyre 80 Neurointerventional Surgery [phone] [id number] Follow-up Instructions Return please notify ofice with your decision. Your Appointments 8/7/2018  8:50 AM  
Follow Up with MD Ana Carroll 26 ([facility] Appt Note: [address] [id number] [address] [address] Upcoming Health Maintenance Date Due  
 LIPID PANEL Q1 12/13/[birth year]31 FOOT EXAM Q1 12/13/1941 DTaP/Tdap/Td series (1 - Tdap) 12/13/1952 ZOSTER VACCINE AGE 60> 10/13/1991 Pneumococcal 65+ High/Highest Risk (1 of 2 - PCV13) 12/13/1996 MEDICARE YEARLY EXAM 12/13/1996 Influenza Age 5 to Adult 8/1/2017 EYE EXAM RETINAL OR DILATED Q1 5/27/2018 HEMOGLOBIN A1C Q6M 7/10/2018 MICROALBUMIN Q1 1/10/2019 GLAUCOMA SCREENING Q2Y 5/27/2019 Allergies as of 1/19/2018  Review Complete On: 1/19/2018 By: Sumit Palma CNA Severity Noted Reaction Type Reactions Relafen [Nabumetone]  03/08/2011   Systemic Itching Current Immunizations  Never Reviewed No immunizations on file. Not reviewed this visit Vitals BP Pulse Temp Resp Height(growth percentile) Weight(growth percentile) 124/75 (BP 1 Location: Right arm, BP Patient Position: Sitting) 71 97.7 °F (36.5 °C) (Oral) 18 5' 11\" (1.803 m) 174 lb (78.9 kg) SpO2 BMI Smoking Status 96% 24.27 kg/m2 Former Smoker BMI and BSA Data Body Mass Index Body Surface Area  
 24.27 kg/m 2 1.99 m 2 Preferred Pharmacy Pharmacy Name Phone [id number] [facility] [phone] Your Updated Medication List  
  
   
This list is accurate as of: 1/19/18 11:29 AM.  Always use your most recent med list.  
  
  
  
  
 amiodarone 200 mg tablet Commonly known as:  CORDARONE Take 100 mg by mouth daily. apixaban 5 mg tablet Commonly known as:  Joan Spine Take 5 mg by mouth two (2) times a day. bimatoprost 0.01 % ophthalmic drops Commonly known as:  LUMIGAN Administer 1 Drop to both eyes nightly. cholecalciferol 1,000 unit tablet Commonly known as:  VITAMIN D3 Take 1,000 Units by mouth daily. diclofenac 1 % Gel Commonly known as:  VOLTAREN Apply 2 g to affected area four (4) times daily as needed. furosemide 20 mg tablet Commonly known as:  LASIX TAKE 1 TABLET BY MOUTH EVERY OTHER DAY OR AS DIRECTED * hydrocortisone 20 mg tablet Commonly known as:  CORTEF Take 1 Tab by mouth two (2) times daily (with meals). * hydrocortisone 10 mg tablet Commonly known as:  CORTEF  
TAKE 1 TABLET BY MOUTH TWICE A DAY  
  
 latanoprost 0.005 % ophthalmic solution Commonly known as:  XALATAN  
INSTILL 1 DROP BY OPHTHALMIC ROUTE EVERY DAY INTO BOTH EYES AT BEDTIME  
  
 lidocaine 5 % Commonly known as:  LIDODERM  
1 Patch by TransDERmal route every twelve (12) hours as needed. nateglinide 60 mg tablet Commonly known as:  Corinhamida Martinezs TAKE 1 TABLET BY MOUTH 3 TIMES A DAY WITH MEALS  
  
 predniSONE 10 mg tablet Commonly known as:  DELTASONE  
6 tabs day 1, 5 tabs day 2, 4 tabs day 3, 3 tabs day 4, 2 tabs day 5, 1 tab day 6 PREVIDENT 5000 BOOSTER PLUS 1.1 % Pste Generic drug:  fluoride (sodium) BRUSH AFTER BREAKFAST AND SUPPER CAN ALSO USE IN FLUORIDE TRAY  
  
 spironolactone 50 mg tablet Commonly known as:  ALDACTONE Take 1 Tab by mouth daily. traMADol 50 mg tablet Commonly known as:  Lorena Vyas  
 Take 1 Tab by mouth every six (6) hours as needed. Max Daily Amount: 200 mg.  
  
 TYLENOL EXTRA STRENGTH 500 mg tablet Generic drug:  acetaminophen Take 500 mg by mouth every six (6) hours as needed for Pain. * Notice: This list has 2 medication(s) that are the same as other medications prescribed for you. Read the directions carefully, and ask your doctor or other care provider to review them with you. Follow-up Instructions Return please notify ofice with your decision. Introducing [facility]! [employer] introduces [employer] patient portal. Now you can access parts of your medical record, email your doctor's office, and request medication refills online. 1. In your internet browser, go to [email or web address] 2. Click on the First Time User? Click Here link in the Sign In box. You will see the New Member Sign Up page. 3. Enter your [employer] Access Code exactly as it appears below. You will not need to use this code after youve completed the sign-up process. If you do not sign up before the expiration date, you must request a new code. · [employer] Access Code: [email or web address] Expires: 3/11/2018  1:53 PM 
 
4. Enter the last four digits of your Social Security Number (xxxx) and Date of Birth (mm/dd/yyyy) as indicated and click Submit. You will be taken to the next sign-up page. 5. Create a [employer] ID. This will be your [employer] login ID and cannot be changed, so think of one that is secure and easy to remember. 6. Create a [employer] password. You can change your password at any time. 7. Enter your Password Reset Question and Answer. This can be used at a later time if you forget your password. 8. Enter your e-mail address. You will receive e-mail notification when new information is available in [address] 9. Click Sign Up. You can now view and download portions of your medical record. 10. Click the Download Summary menu link to download a portable copy of your medical information. If you have questions, please visit the Frequently Asked Questions section of the [employer] website. Remember, [employer] is NOT to be used for urgent needs. For medical emergencies, dial 911. Now available from your iPhone and Android! Please provide this summary of care documentation to your next provider. Your primary care clinician is listed as C April Finder. If you have any questions after today's visit, please call [phone].

## 2018-01-19 NOTE — PROGRESS NOTES
Neurointerventional Surgery  Ambulatory Progress Note      Patient: Toby Medina MRN: [id number]  SSN: [id number]    YOB: 1931  Age: 80 y.o. Sex: male      History of Present Illness:      81 yo male presents to clinic as referral from [address], with a increased interval change from  with his known left middle cerebral artery aneurysm dx in  during routine imaging for his melanoma. Previously measured at 10.4 x 11.5 x 10.7 mm and now depicted at 14.9 x 17.1 x 14.2. Initially he was assessed per Dr Maria Dolores Rose who at that time decided to follow it with conservative management, no further follow was suggested. Pt has complicated medical history that includes dx of Melanoma that was discovered 7 years ago in his Right heel. He has sustained multiple surgeries and the melanoma has extended to his Right leg with liver involvement. The past (2) years he has been in remission and is receiving Opdivo every 2 weeks via port a cath Left chest.    Complicated medical history includes R nephrectomy () due to Renal CA, Prostate CA 25 yrs ago. 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 He had AVR 10 years ago and routinely sees a cardiologist. Bandar Bolanos MD does PET imaging every 4 - 6 months. Currently on Eliquis 5mg for RLE DVT. He recalls sister had Brain CA and aneurysm, due to complications she   3 month after surgery. He also has a niece from 2nd sister that has a cerebral aneurysm. He is accompanied today with his wife and son. They have multiple questions about aneurysm growth, surveillance, treatment options,  risks and benefits of surgery.      Patient Active Problem List   Diagnosis Code    A-fib ([facility] I48.91    Back pain M54.9    Melanoma ([facility] C43.9    Gastroenteritis K52.9    Dehydration E86.0    ARF (acute renal failure) (HCC) N17.9    CHF (congestive heart failure) ([address] I50.9    Pneumonia J18.9    Adrenal insufficiency ([facility]) E27.40    Small bowel obstruction K56.609    Diabetes mellitus, stable ([facility]) E11.9    Cerebral aneurysm I67.1    Hyperglycemia R73.9    Pure hyperglyceridemia E78.1    Malignant neoplasm of prostate ([facility] C61    Left hip pain M25.552        Review of Systems    A comprehensive ROS was performed and was negative except for as per HPI. Objective:     Current Outpatient Prescriptions   Medication Sig Dispense Refill    furosemide (LASIX) 20 mg tablet TAKE 1 TABLET BY MOUTH EVERY OTHER DAY OR AS DIRECTED  2    hydrocortisone (CORTEF) 10 mg tablet TAKE 1 TABLET BY MOUTH TWICE A DAY  10    latanoprost (XALATAN) 0.005 % ophthalmic solution INSTILL 1 DROP BY OPHTHALMIC ROUTE EVERY DAY INTO BOTH EYES AT BEDTIME  5    nateglinide (STARLIX) 60 mg tablet TAKE 1 TABLET BY MOUTH 3 TIMES A DAY WITH MEALS 90 Tab 4    PREVIDENT 5000 BOOSTER PLUS 1.1 % pste BRUSH AFTER BREAKFAST AND SUPPER CAN ALSO USE IN FLUORIDE TRAY  12    traMADol (ULTRAM) 50 mg tablet Take 1 Tab by mouth every six (6) hours as needed. Max Daily Amount: 200 mg. 120 Tab 0    hydrocortisone (CORTEF) 20 mg tablet Take 1 Tab by mouth two (2) times daily (with meals). 60 Tab 0    bimatoprost (LUMIGAN) 0.01 % ophthalmic drops Administer 1 Drop to both eyes nightly.  cholecalciferol (VITAMIN D3) 1,000 unit tablet Take 1,000 Units by mouth daily.  apixaban (ELIQUIS) 5 mg tablet Take 5 mg by mouth two (2) times a day.  acetaminophen (TYLENOL EXTRA STRENGTH) 500 mg tablet Take 500 mg by mouth every six (6) hours as needed for Pain.  amiodarone (CORDARONE) 200 mg tablet Take 100 mg by mouth daily.  predniSONE (DELTASONE) 10 mg tablet 6 tabs day 1, 5 tabs day 2, 4 tabs day 3, 3 tabs day 4, 2 tabs day 5, 1 tab day 6 21 Tab 0    diclofenac (VOLTAREN) 1 % gel Apply 2 g to affected area four (4) times daily as needed.  100 g 0    spironolactone (ALDACTONE) 50 mg tablet Take 1 Tab by mouth daily. 30 Tab 0    lidocaine (LIDODERM) 5 %(700 mg/patch) 1 Patch by TransDERmal route every twelve (12) hours as needed. Visit Vitals    /75 (BP 1 Location: Right arm, BP Patient Position: Sitting)    Pulse 71    Temp 97.7 °F (36.5 °C) (Oral)    Resp 18    Ht 5' 11\" (1.803 m)    Wt 174 lb (78.9 kg)    SpO2 96%    BMI 24.27 kg/m2       Allergies   Allergen Reactions    Relafen [Nabumetone] Itching        Physical Exam:  General: NAD  Lungs: clear to auscultation bilaterally  Heart: regular rate and rhythm, S1, S2 normal, no murmur, click, rub or gallop  Abdomen: soft, non-tender. Bowel sounds normal. No masses,  no organomegaly  Extremities: extremities normal, atraumatic, no cyanosis or edema  Pulses: 2+ and symmetric  Skin\" poor skin integrity, steroid induced appearance  Neurologic Exam:  Mental Status:  Alert and oriented x 4. Appropriate affect, mood and behavior. Positive attitude, very conversant       Language:    Normal fluency, repetition, comprehension and naming. Cranial Nerves:   Pupils equal, round and reactive to light. Visual fields full to confrontation. Extraocular movements intact. Facial sensation intact V1 - V3. Full facial strength, no asymmetry. Hearing intact bilaterally. No dysarthria. Tongue protrudes to midline, palate elevates symmetrically. Shoulder shrug 5/5 bilaterally. Motor:    No pronator drift. Bulk and tone normal.      5/5 power in all extremities proximally and distally. No involuntary movements. Sensation:    Sensation intact throughout to light touch.     Coordination & Gait: Walks with asst from cane, , FTN intact, unsteady with gait deferred romberg, and all coordination tests      Labs:  Lab Results   Component Value Date/Time    Sodium 135 08/18/2016 01:36 AM    Potassium 4.5 08/18/2016 01:36 AM    Chloride 103 08/18/2016 01:36 AM    CO2 29 08/18/2016 01:36 AM    Anion gap 3 08/18/2016 01:36 AM Glucose 136 08/18/2016 01:36 AM    BUN 23 08/18/2016 01:36 AM    Creatinine 1.05 08/18/2016 01:36 AM    BUN/Creatinine ratio 22 08/18/2016 01:36 AM    GFR est AA >60 08/18/2016 01:36 AM    GFR est non-AA >60 08/18/2016 01:36 AM    Calcium 7.6 08/18/2016 01:36 AM     Lab Results   Component Value Date/Time    WBC 7.6 08/18/2016 01:36 AM    HGB 11.2 08/18/2016 01:36 AM    HCT 34.0 08/18/2016 01:36 AM    PLATELET 927 35/92/8746 01:36 AM    MCV 88.8 08/18/2016 01:36 AM     Lab Results   Component Value Date/Time    MCH 29.2 08/18/2016 01:36 AM    MCHC 32.9 08/18/2016 01:36 AM    BASOPHILS 0 08/18/2016 01:36 AM    ABS. LYMPHOCYTES 1.1 08/18/2016 01:36 AM    ABS. MONOCYTES 0.6 08/18/2016 01:36 AM    ABS. EOSINOPHILS 0.4 08/18/2016 01:36 AM    ABS. BASOPHILS 0.0 08/18/2016 01:36 AM    Magnesium 1.8 08/15/2016 03:41 AM       IMAGING:      Cta Head Neck W Cont    Result Date: 1/10/2018  IMPRESSION: Interval increase in size of large left MCA aneurysm. Recommend consultation interventional neuroradiology. Assessment/Plan:       ICD-10-CM ICD-9-CM    1. Middle cerebral artery aneurysm I67.1 437.3       - very pleasant older gentleman who seeks opinion of recent interval growth of LMCA aneurysm. Complicated medical history, apparently is does appear stable. - currently neurological exam is intact     - extensive patient and family education provided. 30% chance of  SAH with life expectancy of 10 years. (parent lived till 80). Overall 4- 5 % surgical complications risk. Higher anesthesia risk with CHF, Adrenal history and AVR. - illustration provided to patient, with various treatment options, which would probably entail coil embolization with stent assistance due to wide neck status.      - general surgical preoperative education discussed with admission and recovery restrictions     - family requesting additional time to discuss amongst themselves - will notify NIS of decision.     - best medical therapy for stroke prevention utilized and should be continued for life     - signs and sx of stroke discussed and the importance of the activation of EMS, patient verbalized understanding        Follow-up Disposition:    I have discussed the diagnosis with the patient and the intended plan as seen in the above orders. Patient is in agreement. The patient has received an after-visit summary and questions were answered concerning future plans.     Chandrakant Curtis NP

## 2018-02-01 ENCOUNTER — TELEPHONE (OUTPATIENT)
Dept: NEUROSURGERY | Age: 83
End: 2018-02-01

## 2018-02-01 ENCOUNTER — DOCUMENTATION ONLY (OUTPATIENT)
Dept: NEUROSURGERY | Age: 83
End: 2018-02-01

## 2018-02-01 NOTE — PROGRESS NOTES
PC to patient regarding his cerebral aneurysm that updated imaging demonstrates increased size. Discussed with patient our recommendations again of treatment. He has schedule an appointment with his cardiologist and would like to discuss this with him as well on 2/19/2018. Have reminded him that NIS would be available to discuss his risks and treatment plan with any of his medical team as appropriate. I will call him again after his appointment.

## 2018-02-05 RX ORDER — NATEGLINIDE 60 MG/1
TABLET ORAL
Qty: 90 TAB | Refills: 3 | Status: SHIPPED | OUTPATIENT
Start: 2018-02-05 | End: 2018-08-07 | Stop reason: SDUPTHER

## 2018-06-10 NOTE — PROGRESS NOTES
This note will not be viewable in [address] Francesca Stanford is a 80 y.o. male and presents with Irregular Heart Beat (6 month follow up; Room 8)  . Subjective:  Mr. Kira Antonio presents today for follow-up of several problems including paroxysmal atrial fibrillation, adrenal insufficiency, diabetes mellitus, history of middle cerebral artery aneurysm. He denies any shortness of breath or palpitations, chest pain, PND, orthopnea. He does have some mild edema in his lower extremity status post surgery for melanoma remotely. He remains on amiodarone and Eliquis for atrial fibrillation. He takes Starlix for diabetes mellitus. He denies any polyuria or polydipsia or change in visual acuity. He has not had any hypoglycemic events. He does note some numbness and tingling in his right hand which is been present on and off for the past couple of weeks. It seems to be worse with activity.     Past Medical History:   Diagnosis Date    Aneurysm ([address] 10/1/14    BRAIN RECENT DX , followed by dr. hodges, radiologist    Atrial flutter [facility])     s/p cardioversion    Cancer [facility]) 2013    LEFT FOOT ,MELANOMA    Cancer ([address] 1980    RIGHT KIDNEY    Cancer ([address]    Chronic pain     BACK AND RIGHT HIP    Diabetes mellitus, stable ([address] 7/13/2017    Steroid induced    Hyperglycemia 7/13/2017    Steroid induced: follow up A1C, BMP, discontinue Starlix in wnl    LBP (low back pain)     Left hip pain 7/13/2017    Xray shows no obvious Fx or Metastatic lesions, this pain could be arthritic in nature or radicular, will give a corticosteroid injection, if no benefit may need Ortho eval    Malignant neoplasm of prostate ([address] 7/13/2017    OA (osteoarthritis)     Prostate cancer ([address]     Pure hyperglyceridemia 7/13/2017    Renal cell cancer (right)     Small bowel obstruction ([address] 7/13/2017     Past Surgical History:   Procedure Laterality Date    CARDIAC SURG PROCEDURE UNLIST 1995    MITRAL VALVE REPAIR    HX APPENDECTOMY  1960    HX CATARACT REMOVAL Bilateral 2011    HX LUMBAR LAMINECTOMY  1990    HX ORTHOPAEDIC      CARPAL TUNNEL-SERAFIN    HX OTHER SURGICAL  OCT 2014    MELANOMA REMOVED FROM HEEL    HX PROSTATECTOMY  1988    TURP    HX UROLOGICAL Right     NEPHRECTOMY    OR COLSC FLX W/REMOVAL LESION BY HOT BX FORCEPS  9/24/2012         OR SIGMOIDOSCOPY FLX DX W/COLLJ SPEC BR/WA IF PFRMD  9/21/2012          Allergies   Allergen Reactions    Relafen [Nabumetone] Itching     Current Outpatient Prescriptions   Medication Sig Dispense Refill    furosemide (LASIX) 20 mg tablet TAKE 1 TABLET BY MOUTH EVERY OTHER DAY OR AS DIRECTED  2    hydrocortisone (CORTEF) 10 mg tablet TAKE 1 TABLET BY MOUTH TWICE A DAY  10    latanoprost (XALATAN) 0.005 % ophthalmic solution INSTILL 1 DROP BY OPHTHALMIC ROUTE EVERY DAY INTO BOTH EYES AT BEDTIME  5    predniSONE (DELTASONE) 10 mg tablet 6 tabs day 1, 5 tabs day 2, 4 tabs day 3, 3 tabs day 4, 2 tabs day 5, 1 tab day 6 21 Tab 0    PREVIDENT 5000 BOOSTER PLUS 1.1 % pste BRUSH AFTER BREAKFAST AND SUPPER CAN ALSO USE IN FLUORIDE TRAY  12    traMADol (ULTRAM) 50 mg tablet Take 1 Tab by mouth every six (6) hours as needed. Max Daily Amount: 200 mg. 120 Tab 0    cholecalciferol (VITAMIN D3) 1,000 unit tablet Take 1,000 Units by mouth daily.  apixaban (ELIQUIS) 5 mg tablet Take 5 mg by mouth two (2) times a day.  acetaminophen (TYLENOL EXTRA STRENGTH) 500 mg tablet Take 500 mg by mouth every six (6) hours as needed for Pain.  amiodarone (CORDARONE) 200 mg tablet Take 100 mg by mouth daily.  nateglinide (STARLIX) 60 mg tablet TAKE 1 TABLET BY MOUTH 3 TIMES A DAY WITH MEALS 90 Tab 3    diclofenac (VOLTAREN) 1 % gel Apply 2 g to affected area four (4) times daily as needed. 100 g 0    hydrocortisone (CORTEF) 20 mg tablet Take 1 Tab by mouth two (2) times daily (with meals).  60 Tab 0    spironolactone (ALDACTONE) 50 mg tablet Take 1 Tab by mouth daily. 30 Tab 0    bimatoprost (LUMIGAN) 0.01 % ophthalmic drops Administer 1 Drop to both eyes nightly.  lidocaine (LIDODERM) 5 %(700 mg/patch) 1 Patch by TransDERmal route every twelve (12) hours as needed. Social History     Social History    Marital status:      Spouse name: N/A    Number of children: N/A    Years of education: N/A     Social History Main Topics    Smoking status: Former Smoker     Packs/day: 0.25     Years: 2.00     Quit date: 4/18/1953    Smokeless tobacco: Never Used    Alcohol use No    Drug use: No    Sexual activity: Not Asked     Other Topics Concern    None     Social History Narrative     Family History   Problem Relation Age of Onset    Diabetes Father     Dementia Mother     Cancer Sister      BRAIN    Heart Disease Brother     Arthritis-osteo Sister     Anesth Problems Neg Hx        Review of Systems  Constitutional:  negative for fevers, chills, anorexia and weight loss  Eyes:    negative for visual disturbance and irritation  ENT:    negative for tinnitus,sore throat,nasal congestion,ear pains. hoarseness  Respiratory:     negative for cough, hemoptysis, dyspnea,wheezing  CV:    negative for chest pain, palpitations, lower extremity edema  GI:    negative for nausea, vomiting, diarrhea, abdominal pain,melena  Endo:               negative for polyuria,polydipsia,polyphagia,heat intolerance  Genitourinary : negative for frequency, dysuria and hematuria  Integumentary: negative for rash and pruritus  Hematologic:   negative for easy bruising and gum/nose bleeding  Musculoskel:  negative for myalgias, arthralgias, back pain, muscle weakness, joint pain  Neurological:   negative for headaches, dizziness, vertigo, memory problems and gait   Behavl/Psych:  negative for feelings of anxiety, depression, mood changes  ROS otherwise negative      Objective:  Visit Vitals    /71 (BP 1 Location: Right arm, BP Patient Position: Sitting)    Pulse 67    Temp 98 °F (36.7 °C) (Oral)    Resp 20    Ht 5' 8\" (1.727 m)    Wt 173 lb (78.5 kg)    SpO2 97%    BMI 26.3 kg/m2     Physical Exam:   General appearance - alert, well appearing, and in no distress  Mental status - alert, oriented to person, place, and time  EYE-JASMINE, EOMI, fundi normal, corneas normal, no foreign bodies  ENT-ENT exam normal, no neck nodes or sinus tenderness  Nose - normal and patent, no erythema, discharge or polyps  Mouth - mucous membranes moist, pharynx normal without lesions  Neck - supple, no significant adenopathy   Chest - clear to auscultation, no wheezes, rales or rhonchi, symmetric air entry   Heart - normal rate, regular rhythm, normal S1, S2, no murmurs, rubs, clicks or gallops   Abdomen - soft, nontender, nondistended, no masses or organomegaly  Lymph- no adenopathy palpable  Ext-peripheral pulses normal, no pedal edema, no clubbing or cyanosis  Skin-Warm and dry. no hyperpigmentation, vitiligo, or suspicious lesions  Neuro -alert, oriented, normal speech, no focal findings or movement disorder noted  Musculoskeletal-Phalen's sign is weakly positive, Tinel sign is negative, right  strength is normal    Assessment/Plan:  Diagnoses and all orders for this visit:    1. Paroxysmal atrial fibrillation (HCC)    2. Adrenal insufficiency ([facility]    3. Diabetes mellitus, stable ([facility])  -     AMB POC HEMOGLOBIN A1C  -     AMB POC COMPREHENSIVE METABOLIC PANEL  -     AMB POC URINALYSIS DIP STICK AUTO W/ MICRO   -     AMB POC URINE, MICROALBUMIN, SEMIQUANT (1 RESULT)    4. Carpal tunnel syndrome of right wrist    Other orders  -     predniSONE (DELTASONE) 10 mg tablet; 6 tabs day 1, 5 tabs day 2, 4 tabs day 3, 3 tabs day 4, 2 tabs day 5, 1 tab day 6          ICD-10-CM ICD-9-CM    1. Paroxysmal atrial fibrillation (HCC) I48.0 427.31    2. Adrenal insufficiency (HCC) E27.40 255.41    3.  Diabetes mellitus, stable (HCC) E11.9 250.00 AMB POC HEMOGLOBIN A1C      AMB POC COMPREHENSIVE METABOLIC PANEL      AMB POC URINALYSIS DIP STICK AUTO W/ MICRO       AMB POC URINE, MICROALBUMIN, SEMIQUANT (1 RESULT)   4. Carpal tunnel syndrome of right wrist G56.01 354.0      Plan:    Patient appears to have carpal tunnel based on his symptoms and exam.  He will be placed on a steroid Dosepak. If his symptoms persist or progress referral to orthopedics. Continue Starlix for diabetes and follow-up labs including R4I metabolic profile urinalysis and urine for microalbumin. Continue cortisone for adrenal insufficiency. Continue amiodarone and Eliquis for atrial fibrillation which appears to be controlled. Follow-up Disposition:  Return in about 6 months (around 7/10/2018) for follow up. I have reviewed with the patient details of the assessment and plan and all questions were answered. Relevent patient education was performed. Verbal and/or written instructions (see AVS) provided. The most recent lab findings were reviewed with the patient. Plan was discussed with patient who verbally expressed understanding. An After Visit Summary was printed and given to the patient.     Alyssa William MD

## 2018-06-25 ENCOUNTER — HOSPITAL ENCOUNTER (OUTPATIENT)
Dept: CT IMAGING | Age: 83
Discharge: HOME | End: 2018-06-25
Attending: INTERNAL MEDICINE
Payer: MEDICARE

## 2018-06-25 DIAGNOSIS — I67.1 CEREBRAL ANEURYSM, NONRUPTURED: ICD-10-CM

## 2018-06-25 DIAGNOSIS — C43.70: ICD-10-CM

## 2018-06-25 LAB — CREAT BLD-MCNC: 1.5 MG/DL (ref 0.6–1.3)

## 2018-06-25 PROCEDURE — 74011636320 HC RX REV CODE- 636/320: Performed by: INTERNAL MEDICINE

## 2018-06-25 PROCEDURE — 70496 CT ANGIOGRAPHY HEAD: CPT

## 2018-06-25 PROCEDURE — 82565 ASSAY OF CREATININE: CPT

## 2018-06-25 RX ORDER — SODIUM CHLORIDE 9 MG/ML
50 INJECTION, SOLUTION INTRAVENOUS
Status: DISPENSED | OUTPATIENT
Start: 2018-06-25 | End: 2018-06-25

## 2018-06-25 RX ORDER — SODIUM CHLORIDE 0.9 % (FLUSH) 0.9 %
10 SYRINGE (ML) INJECTION
Status: ACTIVE | OUTPATIENT
Start: 2018-06-25 | End: 2018-06-25

## 2018-06-25 RX ADMIN — IOPAMIDOL 98 ML: 755 INJECTION, SOLUTION INTRAVENOUS at 10:57

## 2018-07-20 ENCOUNTER — HOSPITAL ENCOUNTER (OUTPATIENT)
Dept: PET IMAGING | Age: 83
Discharge: HOME | End: 2018-07-20
Attending: INTERNAL MEDICINE
Payer: MEDICARE

## 2018-07-20 VITALS — HEIGHT: 68 IN | WEIGHT: 182 LBS | BODY MASS INDEX: 27.58 KG/M2

## 2018-07-20 DIAGNOSIS — C43.71 MALIGNANT MELANOMA OF RIGHT LOWER EXTREMITY INCLUDING HIP (HCC): ICD-10-CM

## 2018-07-20 DIAGNOSIS — C79.51 SECONDARY MALIGNANT NEOPLASM OF BONE (HCC): ICD-10-CM

## 2018-07-20 DIAGNOSIS — C79.70: ICD-10-CM

## 2018-07-20 DIAGNOSIS — Z08 ENCOUNTER FOR FOLLOW-UP EXAMINATION AFTER TREATMENT FOR MALIGNANT NEOPLASM: ICD-10-CM

## 2018-07-20 DIAGNOSIS — C79.89 SECONDARY MALIGNANT NEOPLASM OF OTHER SPECIFIED SITES (HCC): ICD-10-CM

## 2018-07-20 DIAGNOSIS — C78.7 SECONDARY MALIGNANT NEOPLASM OF LIVER AND INTRAHEPATIC BILE DUCT (HCC): ICD-10-CM

## 2018-07-20 PROCEDURE — A9552 F18 FDG: HCPCS

## 2018-07-20 RX ORDER — SODIUM CHLORIDE 0.9 % (FLUSH) 0.9 %
10 SYRINGE (ML) INJECTION
Status: COMPLETED | OUTPATIENT
Start: 2018-07-20 | End: 2018-07-20

## 2018-07-20 RX ADMIN — Medication 10 ML: at 09:00

## 2018-08-07 RX ORDER — NATEGLINIDE 60 MG/1
TABLET ORAL
Qty: 90 TAB | Refills: 3 | Status: SHIPPED | OUTPATIENT
Start: 2018-08-07 | End: 2019-01-01 | Stop reason: SDUPTHER

## 2018-09-12 ENCOUNTER — HOSPITAL ENCOUNTER (OUTPATIENT)
Dept: MRI IMAGING | Age: 83
Discharge: HOME | End: 2018-09-12
Attending: INTERNAL MEDICINE
Payer: MEDICARE

## 2018-09-12 DIAGNOSIS — C79.51 SECONDARY MALIGNANT NEOPLASM OF BONE (HCC): ICD-10-CM

## 2018-09-12 DIAGNOSIS — C43.71 MALIGNANT MELANOMA OF RIGHT LOWER LIMB, INCLUDING HIP (HCC): ICD-10-CM

## 2018-09-12 DIAGNOSIS — C78.7 SECONDARY MALIGNANT NEOPLASM OF LIVER AND INTRAHEPATIC BILE DUCT (HCC): ICD-10-CM

## 2018-09-12 DIAGNOSIS — Z08 ENCOUNTER FOR FOLLOW-UP EXAMINATION AFTER TREATMENT FOR MALIGNANT NEOPLASM: ICD-10-CM

## 2018-09-12 DIAGNOSIS — C79.70: ICD-10-CM

## 2018-09-12 DIAGNOSIS — C79.89 SECONDARY MALIGNANT NEOPLASM OF OTHER SPECIFIED SITES (HCC): ICD-10-CM

## 2018-09-12 PROCEDURE — 74011250636 HC RX REV CODE- 250/636: Performed by: INTERNAL MEDICINE

## 2018-09-12 PROCEDURE — A9575 INJ GADOTERATE MEGLUMI 0.1ML: HCPCS | Performed by: INTERNAL MEDICINE

## 2018-09-12 PROCEDURE — 70553 MRI BRAIN STEM W/O & W/DYE: CPT

## 2018-09-12 RX ORDER — GADOTERATE MEGLUMINE 376.9 MG/ML
20 INJECTION INTRAVENOUS
Status: COMPLETED | OUTPATIENT
Start: 2018-09-12 | End: 2018-09-12

## 2018-09-12 RX ADMIN — GADOTERATE MEGLUMINE 20 ML: 376.9 INJECTION INTRAVENOUS at 10:38

## 2018-10-31 ENCOUNTER — APPOINTMENT (OUTPATIENT)
Dept: CT IMAGING | Age: 83
End: 2018-10-31
Attending: EMERGENCY MEDICINE
Payer: MEDICARE

## 2018-10-31 ENCOUNTER — HOSPITAL ENCOUNTER (EMERGENCY)
Age: 83
Discharge: HOME | End: 2018-10-31
Attending: EMERGENCY MEDICINE
Payer: MEDICARE

## 2018-10-31 ENCOUNTER — APPOINTMENT (OUTPATIENT)
Dept: GENERAL RADIOLOGY | Age: 83
End: 2018-10-31
Attending: EMERGENCY MEDICINE
Payer: MEDICARE

## 2018-10-31 VITALS
SYSTOLIC BLOOD PRESSURE: 104 MMHG | HEIGHT: 68 IN | HEART RATE: 86 BPM | BODY MASS INDEX: 27.28 KG/M2 | WEIGHT: 180 LBS | RESPIRATION RATE: 24 BRPM | DIASTOLIC BLOOD PRESSURE: 42 MMHG | OXYGEN SATURATION: 92 % | TEMPERATURE: 99.1 F

## 2018-10-31 DIAGNOSIS — R06.00 DYSPNEA, UNSPECIFIED TYPE: Primary | ICD-10-CM

## 2018-10-31 LAB
ALBUMIN SERPL-MCNC: 3.7 G/DL (ref 3.5–5)
ALBUMIN/GLOB SERPL: 0.9 {RATIO} (ref 1.1–2.2)
ALP SERPL-CCNC: 73 U/L (ref 45–117)
ALT SERPL-CCNC: 21 U/L (ref 12–78)
ANION GAP SERPL CALC-SCNC: 7 MMOL/L (ref 5–15)
APTT PPP: 28 SEC (ref 22.1–32)
AST SERPL-CCNC: 21 U/L (ref 15–37)
BASOPHILS # BLD: 0 K/UL (ref 0–0.1)
BASOPHILS NFR BLD: 0 % (ref 0–1)
BILIRUB SERPL-MCNC: 0.8 MG/DL (ref 0.2–1)
BNP SERPL-MCNC: 1094 PG/ML (ref 0–450)
BUN SERPL-MCNC: 26 MG/DL (ref 6–20)
BUN/CREAT SERPL: 17 (ref 12–20)
CALCIUM SERPL-MCNC: 8.9 MG/DL (ref 8.5–10.1)
CHLORIDE SERPL-SCNC: 97 MMOL/L (ref 97–108)
CK SERPL-CCNC: 67 U/L (ref 39–308)
CO2 SERPL-SCNC: 29 MMOL/L (ref 21–32)
CREAT SERPL-MCNC: 1.49 MG/DL (ref 0.7–1.3)
DIFFERENTIAL METHOD BLD: ABNORMAL
EOSINOPHIL # BLD: 0.1 K/UL (ref 0–0.4)
EOSINOPHIL NFR BLD: 1 % (ref 0–7)
ERYTHROCYTE [DISTWIDTH] IN BLOOD BY AUTOMATED COUNT: 13.8 % (ref 11.5–14.5)
GLOBULIN SER CALC-MCNC: 4 G/DL (ref 2–4)
GLUCOSE SERPL-MCNC: 104 MG/DL (ref 65–100)
HCT VFR BLD AUTO: 49 % (ref 36.6–50.3)
HGB BLD-MCNC: 16 G/DL (ref 12.1–17)
IMM GRANULOCYTES # BLD: 0.1 K/UL (ref 0–0.04)
IMM GRANULOCYTES NFR BLD AUTO: 1 % (ref 0–0.5)
INR PPP: 1 (ref 0.9–1.1)
LYMPHOCYTES # BLD: 0.5 K/UL (ref 0.8–3.5)
LYMPHOCYTES NFR BLD: 4 % (ref 12–49)
MCH RBC QN AUTO: 30.3 PG (ref 26–34)
MCHC RBC AUTO-ENTMCNC: 32.7 G/DL (ref 30–36.5)
MCV RBC AUTO: 92.8 FL (ref 80–99)
MONOCYTES # BLD: 0.7 K/UL (ref 0–1)
MONOCYTES NFR BLD: 6 % (ref 5–13)
NEUTS SEG # BLD: 9.9 K/UL (ref 1.8–8)
NEUTS SEG NFR BLD: 88 % (ref 32–75)
NRBC # BLD: 0 K/UL (ref 0–0.01)
NRBC BLD-RTO: 0 PER 100 WBC
PLATELET # BLD AUTO: 180 K/UL (ref 150–400)
PMV BLD AUTO: 11.2 FL (ref 8.9–12.9)
POTASSIUM SERPL-SCNC: 5.3 MMOL/L (ref 3.5–5.1)
PROT SERPL-MCNC: 7.7 G/DL (ref 6.4–8.2)
PROTHROMBIN TIME: 10.5 SEC (ref 9–11.1)
RBC # BLD AUTO: 5.28 M/UL (ref 4.1–5.7)
RBC MORPH BLD: ABNORMAL
SODIUM SERPL-SCNC: 133 MMOL/L (ref 136–145)
THERAPEUTIC RANGE,PTTT: NORMAL SECS (ref 58–77)
TROPONIN I SERPL-MCNC: <0.05 NG/ML
WBC # BLD AUTO: 11.3 K/UL (ref 4.1–11.1)

## 2018-10-31 PROCEDURE — 99284 EMERGENCY DEPT VISIT MOD MDM: CPT

## 2018-10-31 PROCEDURE — 83880 ASSAY OF NATRIURETIC PEPTIDE: CPT | Performed by: EMERGENCY MEDICINE

## 2018-10-31 PROCEDURE — 74011250636 HC RX REV CODE- 250/636: Performed by: EMERGENCY MEDICINE

## 2018-10-31 PROCEDURE — 74011636637 HC RX REV CODE- 636/637: Performed by: EMERGENCY MEDICINE

## 2018-10-31 PROCEDURE — 71046 X-RAY EXAM CHEST 2 VIEWS: CPT

## 2018-10-31 PROCEDURE — 85730 THROMBOPLASTIN TIME PARTIAL: CPT | Performed by: EMERGENCY MEDICINE

## 2018-10-31 PROCEDURE — 74011636320 HC RX REV CODE- 636/320: Performed by: EMERGENCY MEDICINE

## 2018-10-31 PROCEDURE — A9270 NON-COVERED ITEM OR SERVICE: HCPCS | Performed by: EMERGENCY MEDICINE

## 2018-10-31 PROCEDURE — 84484 ASSAY OF TROPONIN QUANT: CPT | Performed by: EMERGENCY MEDICINE

## 2018-10-31 PROCEDURE — 93005 ELECTROCARDIOGRAM TRACING: CPT

## 2018-10-31 PROCEDURE — 85025 COMPLETE CBC W/AUTO DIFF WBC: CPT | Performed by: EMERGENCY MEDICINE

## 2018-10-31 PROCEDURE — 82550 ASSAY OF CK (CPK): CPT | Performed by: EMERGENCY MEDICINE

## 2018-10-31 PROCEDURE — 85610 PROTHROMBIN TIME: CPT | Performed by: EMERGENCY MEDICINE

## 2018-10-31 PROCEDURE — 80053 COMPREHEN METABOLIC PANEL: CPT | Performed by: EMERGENCY MEDICINE

## 2018-10-31 PROCEDURE — 36415 COLL VENOUS BLD VENIPUNCTURE: CPT | Performed by: EMERGENCY MEDICINE

## 2018-10-31 PROCEDURE — 71275 CT ANGIOGRAPHY CHEST: CPT

## 2018-10-31 RX ORDER — HEPARIN 100 UNIT/ML
300 SYRINGE INTRAVENOUS AS NEEDED
Status: DISCONTINUED | OUTPATIENT
Start: 2018-10-31 | End: 2018-11-01 | Stop reason: HOSPADM

## 2018-10-31 RX ORDER — PREDNISONE 10 MG/1
20 TABLET ORAL
Status: COMPLETED | OUTPATIENT
Start: 2018-10-31 | End: 2018-10-31

## 2018-10-31 RX ORDER — SODIUM CHLORIDE 9 MG/ML
50 INJECTION, SOLUTION INTRAVENOUS
Status: COMPLETED | OUTPATIENT
Start: 2018-10-31 | End: 2018-10-31

## 2018-10-31 RX ORDER — SODIUM CHLORIDE 0.9 % (FLUSH) 0.9 %
10 SYRINGE (ML) INJECTION
Status: COMPLETED | OUTPATIENT
Start: 2018-10-31 | End: 2018-10-31

## 2018-10-31 RX ADMIN — Medication 10 ML: at 19:39

## 2018-10-31 RX ADMIN — PREDNISONE 20 MG: 10 TABLET ORAL at 22:16

## 2018-10-31 RX ADMIN — SODIUM CHLORIDE 50 ML/HR: 900 INJECTION, SOLUTION INTRAVENOUS at 19:39

## 2018-10-31 RX ADMIN — IOPAMIDOL 80 ML: 755 INJECTION, SOLUTION INTRAVENOUS at 19:18

## 2018-10-31 RX ADMIN — SODIUM CHLORIDE, PRESERVATIVE FREE 300 UNITS: 5 INJECTION INTRAVENOUS at 22:16

## 2018-10-31 NOTE — ED PROVIDER NOTES
EMERGENCY DEPARTMENT HISTORY AND PHYSICAL EXAM 
 
 
Date: 10/31/2018 Patient Name: Lissett Gonzalez History of Presenting Illness Chief Complaint Patient presents with  Shortness of Breath  
  dyspnea with exertion for a few weeks, no chest pain History Provided By: Patient HPI: Lissett Gonzalez, 80 y.o. male with PMHx significant for metastatic melanoma on maintenance chemotherapy q 2 weeks (though pt states he is in remission), brain aneurysm, a-flutter s/p cardioversion, DM, hyperglycemia, presents via WC to the ED with cc of worsening SOB x weeks with associated weakness onset today. He states that his SOB is exacerbated primarily with exertion, but today he felt SOB when he tried to stand up out of a chair. 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 However, 3 wks ago, he started a Prednisone 20mg taper. Pt is currently in remission, and receives chemotherapy treatment every 2 wks. Pt reports that he is on a blood thinner for lower extremity blood clot a few years ago; Dr. Jaden Khanna has pt on half a dose of eliquis due to concern for increased risk of blood clots in cancer pts. Pt denies waking up from sleep or being unable to lay flat due to SOB. He denies fever, n/v/d, or abd pain. No one sided weakness, falls, HA, fevers, cough/cold sx. There are no other complaints, changes, or physical findings at this time. PCP: Michael Washington MD 
 
Social Hx:  
Social History Tobacco Use Smoking Status Former Smoker  Packs/day: 0.25  
 Years: 2.00  
 Pack years: 0.50  Last attempt to quit: 1953  Years since quittin.5 Smokeless Tobacco Never Used  
,  
Social History Substance and Sexual Activity Alcohol Use No  
,  
Social History Substance and Sexual Activity Drug Use No  
 
 
Past History Past Surgical History: 
Past Surgical History:  
Procedure Laterality Date [address] [facility] 635 MITRAL VALVE REPAIR  
 [address]  HX CATARACT REMOVAL Bilateral 2011  HX LUMBAR LAMINECTOMY  1990  
 HX ORTHOPAEDIC    
 CARPAL TUNNEL-SERAFIN  HX OTHER SURGICAL  OCT 2014 MELANOMA REMOVED FROM HEEL  
 [address] TURP  
 HX UROLOGICAL Right NEPHRECTOMY  [place] COLSC FLX W/REMOVAL LESION BY HOT BX FORCEPS  9/24/2012  [place] SIGMOIDOSCOPY FLX DX W/COLLJ SPEC BR/WA IF PFRMD  9/21/2012 Family History: 
Family History Problem Relation Age of Onset  Diabetes Father  Dementia Mother  Cancer Sister BRAIN  
 Heart Disease Brother  Arthritis-osteo Sister  Anesth Problems Neg Hx Allergies: Allergies Allergen Reactions  Relafen [Nabumetone] Itching Review of Systems Review of Systems Constitutional: Positive for chills. Negative for fever. HENT: Negative for congestion, rhinorrhea and sore throat. Respiratory: Positive for shortness of breath. Negative for cough. Cardiovascular: Negative for chest pain. Gastrointestinal: Negative for abdominal pain, nausea and vomiting. Genitourinary: Negative for dysuria and urgency. Skin: Negative for rash. Neurological: Positive for weakness. Negative for dizziness, light-headedness and headaches. All other systems reviewed and are negative. Physical Exam  
Physical Exam  
Constitutional: He is oriented to person, place, and time. He appears well-developed and well-nourished. No distress. HENT:  
Head: Normocephalic and atraumatic. Eyes: Conjunctivae and EOM are normal. Pupils are equal, round, and reactive to light. Neck: Normal range of motion. Cardiovascular: Normal rate, regular rhythm and intact distal pulses. Pulmonary/Chest: Effort normal and breath sounds normal. No stridor. No respiratory distress. Port in L chest with no erythema Abdominal: Soft. He exhibits no distension. There is no tenderness. Musculoskeletal: Normal range of motion. Neurological: He is alert and oriented to person, place, and time. Skin: Skin is warm and dry. Psychiatric: He has a normal mood and affect. Nursing note and vitals reviewed. Diagnostic Study Results Labs - Recent Results (from the past 12 hour(s)) EKG, 12 LEAD, INITIAL Collection Time: 10/31/18  3:48 PM  
Result Value Ref Range Ventricular Rate 77 BPM  
 Atrial Rate 77 BPM  
 P-R Interval 152 ms QRS Duration 146 ms  
 Q-T Interval 424 ms QTC Calculation (Bezet) 479 ms Calculated P Axis 14 degrees Calculated R Axis -62 degrees Calculated T Axis 13 degrees Diagnosis Normal sinus rhythm Possible Left atrial enlargement Left axis deviation Right bundle branch block Inferior infarct (cited on or before 02-OCT-2014) When compared with ECG of 09-AUG-2016 13:19, 
QRS duration has decreased CBC WITH AUTOMATED DIFF Collection Time: 10/31/18  3:52 PM  
Result Value Ref Range WBC 11.3 (H) 4.1 - 11.1 K/uL  
 RBC 5.28 4.10 - 5.70 M/uL  
 HGB 16.0 12.1 - 17.0 g/dL HCT 49.0 36.6 - 50.3 % MCV 92.8 80.0 - 99.0 FL  
 MCH 30.3 26.0 - 34.0 PG  
 MCHC 32.7 30.0 - 36.5 g/dL  
 RDW 13.8 11.5 - 14.5 % PLATELET 518 385 - 410 K/uL MPV 11.2 8.9 - 12.9 FL  
 NRBC 0.0 0  WBC ABSOLUTE NRBC 0.00 0.00 - 0.01 K/uL NEUTROPHILS 88 (H) 32 - 75 % LYMPHOCYTES 4 (L) 12 - 49 % MONOCYTES 6 5 - 13 % EOSINOPHILS 1 0 - 7 % BASOPHILS 0 0 - 1 % IMMATURE GRANULOCYTES 1 (H) 0.0 - 0.5 % ABS. NEUTROPHILS 9.9 (H) 1.8 - 8.0 K/UL  
 ABS. LYMPHOCYTES 0.5 (L) 0.8 - 3.5 K/UL  
 ABS. MONOCYTES 0.7 0.0 - 1.0 K/UL  
 ABS. EOSINOPHILS 0.1 0.0 - 0.4 K/UL  
 ABS. BASOPHILS 0.0 0.0 - 0.1 K/UL  
 ABS. IMM. GRANS. 0.1 (H) 0.00 - 0.04 K/UL  
 DF SMEAR SCANNED    
 RBC COMMENTS NORMOCYTIC, NORMOCHROMIC METABOLIC PANEL, COMPREHENSIVE  
 Collection Time: 10/31/18  3:52 PM  
Result Value Ref Range Sodium 133 (L) 136 - 145 mmol/L Potassium 5.3 (H) 3.5 - 5.1 mmol/L Chloride 97 97 - 108 mmol/L  
 CO2 29 21 - 32 mmol/L Anion gap 7 5 - 15 mmol/L Glucose 104 (H) 65 - 100 mg/dL BUN 26 (H) 6 - 20 MG/DL Creatinine 1.49 (H) 0.70 - 1.30 MG/DL  
 BUN/Creatinine ratio 17 12 - 20 GFR est AA 54 (L) >60 ml/min/1.73m2 GFR est non-AA 45 (L) >60 ml/min/1.73m2 Calcium 8.9 8.5 - 10.1 MG/DL Bilirubin, total 0.8 0.2 - 1.0 MG/DL  
 ALT (SGPT) 21 12 - 78 U/L  
 AST (SGOT) 21 15 - 37 U/L Alk. phosphatase 73 45 - 117 U/L Protein, total 7.7 6.4 - 8.2 g/dL Albumin 3.7 3.5 - 5.0 g/dL Globulin 4.0 2.0 - 4.0 g/dL A-G Ratio 0.9 (L) 1.1 - 2.2 CK W/ REFLX CKMB Collection Time: 10/31/18  3:52 PM  
Result Value Ref Range CK 67 39 - 308 U/L  
TROPONIN I Collection Time: 10/31/18  3:52 PM  
Result Value Ref Range Troponin-I, Qt. <0.05 <0.05 ng/mL PTT Collection Time: 10/31/18  3:52 PM  
Result Value Ref Range aPTT 28.0 22.1 - 32.0 sec  
 aPTT, therapeutic range     58.0 - 77.0 SECS  
PROTHROMBIN TIME + INR Collection Time: 10/31/18  3:52 PM  
Result Value Ref Range INR 1.0 0.9 - 1.1 Prothrombin time 10.5 9.0 - 11.1 sec Radiologic Studies -  
CTA CHEST W OR W WO CONT Final Result XR CHEST PA LAT Final Result CT Results  (Last 48 hours) 10/31/18 1938  CTA CHEST W OR W WO CONT Final result Impression:  IMPRESSION:   
1. No pulmonary embolus. 2. Atherosclerosis with coronary artery calcification. 3. Status post median sternotomy. 4. Status post right nephrectomy. 5. Thoracic spondylosis. Narrative:  EXAM: CT ANGIOGRAPHY CHEST INDICATION: Short of breath. COMPARISON: 6/2/2015. CONTRAST: 80 mL of Isovue-370. Hydration was encouraged. TECHNIQUE:   
Precontrast  images were obtained to localize the volume for acquisition. Multislice helical CT arteriography was performed from the diaphragm to the  
thoracic inlet during uneventful rapid bolus intravenous contrast  
administration. Lung and soft tissue windows were generated. Coronal and  
sagittal  reformatted images were also generated and 3D post processing  
consisting of coronal maximum intensity projection images was performed. CT dose  
reduction was achieved through use of a standardized protocol tailored for this  
examination and automatic exposure control for dose modulation. FINDINGS:  
There are sternal sutures. LOWER NECK: The visualized portions of the thyroid and structures of the lower  
neck are within normal limits. LUNGS: The lungs are clear of mass, nodule, airspace disease or edema. There is  
a calcified granuloma in the right lower lobe. PLEURA: There is no pleural effusion or pneumothorax. PULMONARY ARTERIES: The pulmonary arteries are well enhanced and no pulmonary  
emboli are identified. AORTA: The aorta enhances normally without evidence of aneurysm or dissection. MEDIASTINUM: There is no mediastinal or hilar adenopathy or mass. BONES AND SOFT TISSUES: There are degenerative changes of the spine. UPPER ABDOMEN: There are surgical clips from right nephrectomy. CXR Results  (Last 48 hours) 10/31/18 1834  XR CHEST PA LAT Final result Impression:  IMPRESSION: No acute airspace disease, edema or other acute abnormality. Narrative:  EXAM:  XR CHEST PA LAT. INDICATION: Shortness of breath. COMPARISON: 8/13/2016. FINDINGS:   
PA and lateral radiographs of the chest were obtained. There are sternal  
sutures. There is a left jugular Port-A-Cath with tip projecting over the  
superior vena cava, unchanged in position. Lungs: The lungs are clear of mass, nodule, airspace disease or edema. Pleura: There is no pleural effusion or pneumothorax. Mediastinum: The cardiac and mediastinal contours and pulmonary vascularity are  
normal.  
Bones and soft tissues: The bones and soft tissues are within normal limits. There are surgical clips in the upper abdomen. Medical Decision Making I am the first provider for this patient. I reviewed the vital signs, available nursing notes, past medical history, past surgical history, family history and social history. Vital Signs-Reviewed the patient's vital signs. Patient Vitals for the past 12 hrs: 
 Temp Pulse Resp BP SpO2  
10/31/18 2053  86 24  92 % 10/31/18 2000  75 20 104/42   
10/31/18 1538 99.1 °F (37.3 °C) 82 20 128/65 96 % Pulse Oximetry Analysis - 96% on RA Cardic Monitor: normal sinus, 86 BPM 
 
ED EKG interpretation: 
Rhythm: normal sinus rhythm; and regular . Rate (approx.): 77; Axis: left axis deviation; P wave: normal; QRS interval: prolonged; ST/T wave: inverted T wave in III Other findings: RBBB; unchanged from previous ekg. This EKG was interpreted by Ovi Aceves MD,ED Provider. Records Reviewed: Nursing Notes, Old Medical Records, Previous Radiology Studies and Previous Laboratory Studies Provider Notes (Medical Decision Making): DDx: Heart failure, PNA, PE, chemotherapy side effect, anemia, electrolyte imbalance. Prior to my evaluation, patient had basic lab work, troponin, and ekg. Labs with no anemia, no significant electrolyte imbalance. Will check CTA to r/o PE given patient is on a subtherapeutic dose of anticoagulation. Will also give patient a dose of steroids here. ED Course:  
Initial assessment performed. The patients presenting problems have been discussed, and they are in agreement with the care plan formulated and outlined with them. I have encouraged them to ask questions as they arise throughout their visit. Progress Notes: 
9:43 PM 
Will give dose of steroids now.  Will try to ambulate pt. 
 
10:00 PM 
 Patient ambulated with a walker (baseline) without dropping sats or becoming tachypneic. Pt states he felt good. Will d/c home now. Patient has an appointment tomorrow with his oncologist. Recommended discussing steroid dosing at appointment tomorrow as the oncologist has been managing this. Return precautions discussed. Critical Care Time:  
0 minutes. Disposition: 
Discharge Note: 
10:01 PM 
The patient has been re-evaluated and is ready for discharge. Reviewed available results with patient. Counseled patient/parent/guardian on diagnosis and care plan. Patient has expressed understanding, and all questions have been answered. Patient agrees with plan and agrees to follow up as recommended, or return to the ED if their symptoms worsen. Discharge instructions have been provided and explained to the patient, along with reasons to return to the ED. PLAN: 
1. Current Discharge Medication List  
  
 
2. Follow-up Information Follow up With Specialties Details Why Contact Info Kiki Kwong MD Hematology and Oncology, Hematology, Oncology Schedule an appointment as soon as possible for a visit  [address] 
[phone] Yoli Mendez MD Internal Medicine Schedule an appointment as soon as possible for a visit  [address] [facility] [address]. 
[phone] [facility] EMERGENCY DEPT Emergency Medicine  As needed, If symptoms worsen [address] [facility] 
[phone] Return to ED if worse Diagnosis Clinical Impression: 1. Dyspnea, unspecified type Attestations: This note is prepared by Ericka Murillo, acting as scribe for MD Trudi Seals MD: The scribe's documentation has been prepared under my direction and personally reviewed by me in its entirety.  I confirm that the note above accurately reflects all work, treatment, procedures, and medical decision making performed by me.

## 2018-11-01 LAB
ATRIAL RATE: 77 BPM
CALCULATED P AXIS, ECG09: 14 DEGREES
CALCULATED R AXIS, ECG10: -62 DEGREES
CALCULATED T AXIS, ECG11: 13 DEGREES
DIAGNOSIS, 93000: NORMAL
P-R INTERVAL, ECG05: 152 MS
Q-T INTERVAL, ECG07: 424 MS
QRS DURATION, ECG06: 146 MS
QTC CALCULATION (BEZET), ECG08: 479 MS
VENTRICULAR RATE, ECG03: 77 BPM

## 2018-11-01 NOTE — ED NOTES
Patient ambulated in hallway with assistance using walker. Patient traveled on room air and had an oxygen saturation of 92%. MD notified at this time.

## 2018-11-01 NOTE — DISCHARGE INSTRUCTIONS
Please discuss your steroid regimen with your oncologist at your appointment tomorrow     Shortness of Breath: Care Instructions  Your Care Instructions  Shortness of breath has many causes. Sometimes conditions such as anxiety can lead to shortness of breath. Some people get mild shortness of breath when they exercise. Trouble breathing also can be a symptom of a serious problem, such as asthma, lung disease, emphysema, heart problems, and pneumonia. If your shortness of breath continues, you may need tests and treatment. Watch for any changes in your breathing and other symptoms. Follow-up care is a key part of your treatment and safety. Be sure to make and go to all appointments, and call your doctor if you are having problems. It's also a good idea to know your test results and keep a list of the medicines you take. How can you care for yourself at home? · Do not smoke or allow others to smoke around you. If you need help quitting, talk to your doctor about stop-smoking programs and medicines. These can increase your chances of quitting for good. · Get plenty of rest and sleep. · Take your medicines exactly as prescribed. Call your doctor if you think you are having a problem with your medicine. · Find healthy ways to deal with stress. ? Exercise daily. ? Get plenty of sleep. ? Eat regularly and well. When should you call for help? Call 911 anytime you think you may need emergency care. For example, call if:    · You have severe shortness of breath.     · You have symptoms of a heart attack. These may include:  ? Chest pain or pressure, or a strange feeling in the chest.  ? Sweating. ? Shortness of breath. ? Nausea or vomiting. ? Pain, pressure, or a strange feeling in the back, neck, jaw, or upper belly or in one or both shoulders or arms. ? Lightheadedness or sudden weakness. ? A fast or irregular heartbeat.   After you call 911, the  may tell you to chew 1 adult-strength or 2 to 4 low-dose aspirin. Wait for an ambulance. Do not try to drive yourself.    Call your doctor now or seek immediate medical care if:    · Your shortness of breath gets worse or you start to wheeze. Wheezing is a high-pitched sound when you breathe.     · You wake up at night out of breath or have to prop your head up on several pillows to breathe.     · You are short of breath after only light activity or while at rest.    Watch closely for changes in your health, and be sure to contact your doctor if:    · You do not get better over the next 1 to 2 days. Where can you learn more? Go to http://brock-pauline.info/. Enter S780 in the search box to learn more about \"Shortness of Breath: Care Instructions. \"  Current as of: December 6, 2017  Content Version: 11.8  © 4165-0391 [employer]. Care instructions adapted under license by [employer] (which disclaims liability or warranty for this information). If you have questions about a medical condition or this instruction, always ask your healthcare professional. [address] any warranty or liability for your use of this information.

## 2018-11-01 NOTE — ED NOTES
Discharge instructions given to patient by Lexis Johnson MD. Patient verbalized understanding of discharge instructions. Pt discharged without difficulty. Pt discharged in stable condition via wheelchair, accompanied by wife.

## 2018-11-03 ENCOUNTER — APPOINTMENT (OUTPATIENT)
Dept: GENERAL RADIOLOGY | Age: 83
DRG: 194 | End: 2018-11-03
Attending: EMERGENCY MEDICINE
Payer: MEDICARE

## 2018-11-03 ENCOUNTER — HOSPITAL ENCOUNTER (EMERGENCY)
Age: 83
Discharge: HOME | DRG: 194 | End: 2018-11-04
Attending: EMERGENCY MEDICINE
Payer: MEDICARE

## 2018-11-03 VITALS
WEIGHT: 180 LBS | BODY MASS INDEX: 27.28 KG/M2 | DIASTOLIC BLOOD PRESSURE: 64 MMHG | HEART RATE: 83 BPM | SYSTOLIC BLOOD PRESSURE: 143 MMHG | RESPIRATION RATE: 22 BRPM | HEIGHT: 68 IN | TEMPERATURE: 102.6 F | OXYGEN SATURATION: 95 %

## 2018-11-03 DIAGNOSIS — J18.9 PNEUMONIA OF LEFT LOWER LOBE DUE TO INFECTIOUS ORGANISM: Primary | ICD-10-CM

## 2018-11-03 LAB
ALBUMIN SERPL-MCNC: 3.9 G/DL (ref 3.5–5)
ALBUMIN/GLOB SERPL: 0.8 {RATIO} (ref 1.1–2.2)
ALP SERPL-CCNC: 73 U/L (ref 45–117)
ALT SERPL-CCNC: 20 U/L (ref 12–78)
ANION GAP SERPL CALC-SCNC: 7 MMOL/L (ref 5–15)
AST SERPL-CCNC: 19 U/L (ref 15–37)
BASOPHILS # BLD: 0 K/UL (ref 0–0.1)
BASOPHILS NFR BLD: 0 % (ref 0–1)
BILIRUB SERPL-MCNC: 1.2 MG/DL (ref 0.2–1)
BUN SERPL-MCNC: 30 MG/DL (ref 6–20)
BUN/CREAT SERPL: 20 (ref 12–20)
CALCIUM SERPL-MCNC: 9.5 MG/DL (ref 8.5–10.1)
CHLORIDE SERPL-SCNC: 94 MMOL/L (ref 97–108)
CO2 SERPL-SCNC: 27 MMOL/L (ref 21–32)
CREAT SERPL-MCNC: 1.5 MG/DL (ref 0.7–1.3)
DIFFERENTIAL METHOD BLD: ABNORMAL
EOSINOPHIL # BLD: 0.2 K/UL (ref 0–0.4)
EOSINOPHIL NFR BLD: 2 % (ref 0–7)
ERYTHROCYTE [DISTWIDTH] IN BLOOD BY AUTOMATED COUNT: 14 % (ref 11.5–14.5)
FLUAV AG NPH QL IA: NEGATIVE
FLUBV AG NOSE QL IA: NEGATIVE
GLOBULIN SER CALC-MCNC: 4.6 G/DL (ref 2–4)
GLUCOSE SERPL-MCNC: 120 MG/DL (ref 65–100)
HCT VFR BLD AUTO: 49.4 % (ref 36.6–50.3)
HGB BLD-MCNC: 16 G/DL (ref 12.1–17)
IMM GRANULOCYTES # BLD: 0.1 K/UL (ref 0–0.04)
IMM GRANULOCYTES NFR BLD AUTO: 1 % (ref 0–0.5)
LYMPHOCYTES # BLD: 0.9 K/UL (ref 0.8–3.5)
LYMPHOCYTES NFR BLD: 9 % (ref 12–49)
MCH RBC QN AUTO: 30 PG (ref 26–34)
MCHC RBC AUTO-ENTMCNC: 32.4 G/DL (ref 30–36.5)
MCV RBC AUTO: 92.5 FL (ref 80–99)
MONOCYTES # BLD: 0.5 K/UL (ref 0–1)
MONOCYTES NFR BLD: 5 % (ref 5–13)
NEUTS SEG # BLD: 8.3 K/UL (ref 1.8–8)
NEUTS SEG NFR BLD: 83 % (ref 32–75)
NRBC # BLD: 0 K/UL (ref 0–0.01)
NRBC BLD-RTO: 0 PER 100 WBC
PLATELET # BLD AUTO: 170 K/UL (ref 150–400)
PMV BLD AUTO: 11.1 FL (ref 8.9–12.9)
POTASSIUM SERPL-SCNC: 4.7 MMOL/L (ref 3.5–5.1)
PROT SERPL-MCNC: 8.5 G/DL (ref 6.4–8.2)
RBC # BLD AUTO: 5.34 M/UL (ref 4.1–5.7)
SODIUM SERPL-SCNC: 128 MMOL/L (ref 136–145)
WBC # BLD AUTO: 10 K/UL (ref 4.1–11.1)

## 2018-11-03 PROCEDURE — 87040 BLOOD CULTURE FOR BACTERIA: CPT | Performed by: EMERGENCY MEDICINE

## 2018-11-03 PROCEDURE — 87804 INFLUENZA ASSAY W/OPTIC: CPT | Performed by: EMERGENCY MEDICINE

## 2018-11-03 PROCEDURE — 85025 COMPLETE CBC W/AUTO DIFF WBC: CPT | Performed by: EMERGENCY MEDICINE

## 2018-11-03 PROCEDURE — 96361 HYDRATE IV INFUSION ADD-ON: CPT

## 2018-11-03 PROCEDURE — 36415 COLL VENOUS BLD VENIPUNCTURE: CPT | Performed by: EMERGENCY MEDICINE

## 2018-11-03 PROCEDURE — 93005 ELECTROCARDIOGRAM TRACING: CPT

## 2018-11-03 PROCEDURE — 99283 EMERGENCY DEPT VISIT LOW MDM: CPT

## 2018-11-03 PROCEDURE — 71045 X-RAY EXAM CHEST 1 VIEW: CPT

## 2018-11-03 PROCEDURE — 74011250636 HC RX REV CODE- 250/636: Performed by: EMERGENCY MEDICINE

## 2018-11-03 PROCEDURE — 96365 THER/PROPH/DIAG IV INF INIT: CPT

## 2018-11-03 PROCEDURE — 74011250637 HC RX REV CODE- 250/637: Performed by: EMERGENCY MEDICINE

## 2018-11-03 PROCEDURE — 80053 COMPREHEN METABOLIC PANEL: CPT | Performed by: EMERGENCY MEDICINE

## 2018-11-03 RX ORDER — LEVOFLOXACIN 750 MG/1
750 TABLET ORAL DAILY
Qty: 6 TAB | Refills: 0 | Status: SHIPPED | OUTPATIENT
Start: 2018-11-03 | End: 2018-11-03

## 2018-11-03 RX ORDER — LEVOFLOXACIN 750 MG/1
750 TABLET ORAL DAILY
Qty: 6 TAB | Refills: 0 | Status: SHIPPED | OUTPATIENT
Start: 2018-11-03 | End: 2018-11-13

## 2018-11-03 RX ORDER — ACETAMINOPHEN 500 MG
1000 TABLET ORAL
Status: COMPLETED | OUTPATIENT
Start: 2018-11-03 | End: 2018-11-03

## 2018-11-03 RX ORDER — LEVOFLOXACIN 5 MG/ML
750 INJECTION, SOLUTION INTRAVENOUS
Status: COMPLETED | OUTPATIENT
Start: 2018-11-03 | End: 2018-11-04

## 2018-11-03 RX ADMIN — ACETAMINOPHEN 1000 MG: 500 TABLET ORAL at 22:20

## 2018-11-03 RX ADMIN — LEVOFLOXACIN 750 MG: 5 INJECTION, SOLUTION INTRAVENOUS at 23:49

## 2018-11-03 RX ADMIN — SODIUM CHLORIDE 1000 ML: 900 INJECTION, SOLUTION INTRAVENOUS at 22:16

## 2018-11-03 RX ADMIN — SODIUM CHLORIDE 500 ML: 900 INJECTION, SOLUTION INTRAVENOUS at 22:21

## 2018-11-04 ENCOUNTER — HOSPITAL ENCOUNTER (INPATIENT)
Age: 83
LOS: 3 days | Discharge: HOME HEALTH | DRG: 194 | End: 2018-11-07
Attending: EMERGENCY MEDICINE | Admitting: HOSPITALIST
Payer: MEDICARE

## 2018-11-04 DIAGNOSIS — E87.1 HYPONATREMIA: ICD-10-CM

## 2018-11-04 DIAGNOSIS — R53.1 GENERALIZED WEAKNESS: ICD-10-CM

## 2018-11-04 DIAGNOSIS — I48.0 PAROXYSMAL ATRIAL FIBRILLATION (HCC): Chronic | ICD-10-CM

## 2018-11-04 DIAGNOSIS — E11.9 DIABETES MELLITUS, STABLE (HCC): ICD-10-CM

## 2018-11-04 DIAGNOSIS — E86.0 DEHYDRATION: ICD-10-CM

## 2018-11-04 DIAGNOSIS — J18.9 PNEUMONIA OF LEFT LOWER LOBE DUE TO INFECTIOUS ORGANISM: Primary | ICD-10-CM

## 2018-11-04 DIAGNOSIS — E27.40 ADRENAL INSUFFICIENCY (HCC): ICD-10-CM

## 2018-11-04 DIAGNOSIS — E27.1 ADRENAL INSUFFICIENCY (ADDISON'S DISEASE) (HCC): ICD-10-CM

## 2018-11-04 LAB
ALBUMIN SERPL-MCNC: 2.6 G/DL (ref 3.5–5)
ALBUMIN/GLOB SERPL: 0.8 {RATIO} (ref 1.1–2.2)
ALP SERPL-CCNC: 54 U/L (ref 45–117)
ALT SERPL-CCNC: 15 U/L (ref 12–78)
ANION GAP SERPL CALC-SCNC: 6 MMOL/L (ref 5–15)
APPEARANCE UR: CLEAR
AST SERPL-CCNC: 15 U/L (ref 15–37)
ATRIAL RATE: 76 BPM
BACTERIA URNS QL MICRO: NEGATIVE /HPF
BASOPHILS # BLD: 0 K/UL (ref 0–0.1)
BASOPHILS NFR BLD: 0 % (ref 0–1)
BILIRUB SERPL-MCNC: 1.1 MG/DL (ref 0.2–1)
BILIRUB UR QL: NEGATIVE
BUN SERPL-MCNC: 23 MG/DL (ref 6–20)
BUN/CREAT SERPL: 18 (ref 12–20)
CALCIUM SERPL-MCNC: 7.8 MG/DL (ref 8.5–10.1)
CALCULATED P AXIS, ECG09: 14 DEGREES
CALCULATED R AXIS, ECG10: -57 DEGREES
CALCULATED T AXIS, ECG11: 27 DEGREES
CHLORIDE SERPL-SCNC: 97 MMOL/L (ref 97–108)
CO2 SERPL-SCNC: 26 MMOL/L (ref 21–32)
COLOR UR: ABNORMAL
CREAT SERPL-MCNC: 1.3 MG/DL (ref 0.7–1.3)
DIAGNOSIS, 93000: NORMAL
DIFFERENTIAL METHOD BLD: ABNORMAL
EOSINOPHIL # BLD: 0.1 K/UL (ref 0–0.4)
EOSINOPHIL NFR BLD: 1 % (ref 0–7)
EPITH CASTS URNS QL MICRO: ABNORMAL /LPF
ERYTHROCYTE [DISTWIDTH] IN BLOOD BY AUTOMATED COUNT: 13.8 % (ref 11.5–14.5)
GLOBULIN SER CALC-MCNC: 3.4 G/DL (ref 2–4)
GLUCOSE BLD STRIP.AUTO-MCNC: 241 MG/DL (ref 65–100)
GLUCOSE BLD STRIP.AUTO-MCNC: 317 MG/DL (ref 65–100)
GLUCOSE SERPL-MCNC: 148 MG/DL (ref 65–100)
GLUCOSE UR STRIP.AUTO-MCNC: 100 MG/DL
HCT VFR BLD AUTO: 40 % (ref 36.6–50.3)
HGB BLD-MCNC: 13.2 G/DL (ref 12.1–17)
HGB UR QL STRIP: ABNORMAL
IMM GRANULOCYTES # BLD: 0.1 K/UL (ref 0–0.04)
IMM GRANULOCYTES NFR BLD AUTO: 1 % (ref 0–0.5)
KETONES UR QL STRIP.AUTO: NEGATIVE MG/DL
LACTATE BLD-SCNC: 1.18 MMOL/L (ref 0.4–2)
LEUKOCYTE ESTERASE UR QL STRIP.AUTO: NEGATIVE
LYMPHOCYTES # BLD: 0.6 K/UL (ref 0.8–3.5)
LYMPHOCYTES NFR BLD: 5 % (ref 12–49)
MCH RBC QN AUTO: 29.9 PG (ref 26–34)
MCHC RBC AUTO-ENTMCNC: 33 G/DL (ref 30–36.5)
MCV RBC AUTO: 90.5 FL (ref 80–99)
MONOCYTES # BLD: 0.4 K/UL (ref 0–1)
MONOCYTES NFR BLD: 3 % (ref 5–13)
NEUTS SEG # BLD: 10.5 K/UL (ref 1.8–8)
NEUTS SEG NFR BLD: 90 % (ref 32–75)
NITRITE UR QL STRIP.AUTO: NEGATIVE
NRBC # BLD: 0.02 K/UL (ref 0–0.01)
NRBC BLD-RTO: 0.2 PER 100 WBC
P-R INTERVAL, ECG05: 150 MS
PH UR STRIP: 6 [PH] (ref 5–8)
PLATELET # BLD AUTO: 127 K/UL (ref 150–400)
PMV BLD AUTO: 11 FL (ref 8.9–12.9)
POTASSIUM SERPL-SCNC: 4.6 MMOL/L (ref 3.5–5.1)
PROT SERPL-MCNC: 6 G/DL (ref 6.4–8.2)
PROT UR STRIP-MCNC: NEGATIVE MG/DL
Q-T INTERVAL, ECG07: 426 MS
QRS DURATION, ECG06: 144 MS
QTC CALCULATION (BEZET), ECG08: 479 MS
RBC # BLD AUTO: 4.42 M/UL (ref 4.1–5.7)
RBC #/AREA URNS HPF: ABNORMAL /HPF (ref 0–5)
RBC MORPH BLD: ABNORMAL
SERVICE CMNT-IMP: ABNORMAL
SERVICE CMNT-IMP: ABNORMAL
SODIUM SERPL-SCNC: 129 MMOL/L (ref 136–145)
SP GR UR REFRACTOMETRY: 1.01 (ref 1–1.03)
UA: UC IF INDICATED,UAUC: ABNORMAL
UROBILINOGEN UR QL STRIP.AUTO: 0.2 EU/DL (ref 0.2–1)
VENTRICULAR RATE, ECG03: 76 BPM
WBC # BLD AUTO: 11.7 K/UL (ref 4.1–11.1)
WBC URNS QL MICRO: ABNORMAL /HPF (ref 0–4)

## 2018-11-04 PROCEDURE — 74011250637 HC RX REV CODE- 250/637: Performed by: EMERGENCY MEDICINE

## 2018-11-04 PROCEDURE — 74011250636 HC RX REV CODE- 250/636: Performed by: EMERGENCY MEDICINE

## 2018-11-04 PROCEDURE — 93005 ELECTROCARDIOGRAM TRACING: CPT

## 2018-11-04 PROCEDURE — 74011000250 HC RX REV CODE- 250: Performed by: HOSPITALIST

## 2018-11-04 PROCEDURE — 77030003560 HC NDL HUBR BARD -A

## 2018-11-04 PROCEDURE — 74011000250 HC RX REV CODE- 250: Performed by: EMERGENCY MEDICINE

## 2018-11-04 PROCEDURE — 99284 EMERGENCY DEPT VISIT MOD MDM: CPT

## 2018-11-04 PROCEDURE — 81001 URINALYSIS AUTO W/SCOPE: CPT | Performed by: HOSPITALIST

## 2018-11-04 PROCEDURE — 83605 ASSAY OF LACTIC ACID: CPT

## 2018-11-04 PROCEDURE — 74011000258 HC RX REV CODE- 258: Performed by: HOSPITALIST

## 2018-11-04 PROCEDURE — 65660000000 HC RM CCU STEPDOWN

## 2018-11-04 PROCEDURE — 96360 HYDRATION IV INFUSION INIT: CPT

## 2018-11-04 PROCEDURE — 74011636637 HC RX REV CODE- 636/637: Performed by: HOSPITALIST

## 2018-11-04 PROCEDURE — 80053 COMPREHEN METABOLIC PANEL: CPT | Performed by: EMERGENCY MEDICINE

## 2018-11-04 PROCEDURE — 74011250636 HC RX REV CODE- 250/636: Performed by: HOSPITALIST

## 2018-11-04 PROCEDURE — 85025 COMPLETE CBC W/AUTO DIFF WBC: CPT | Performed by: EMERGENCY MEDICINE

## 2018-11-04 PROCEDURE — 77030011943

## 2018-11-04 PROCEDURE — 96366 THER/PROPH/DIAG IV INF ADDON: CPT

## 2018-11-04 PROCEDURE — 74011250637 HC RX REV CODE- 250/637: Performed by: HOSPITALIST

## 2018-11-04 PROCEDURE — 51798 US URINE CAPACITY MEASURE: CPT

## 2018-11-04 PROCEDURE — 82962 GLUCOSE BLOOD TEST: CPT

## 2018-11-04 PROCEDURE — 36415 COLL VENOUS BLD VENIPUNCTURE: CPT | Performed by: EMERGENCY MEDICINE

## 2018-11-04 RX ORDER — LATANOPROST 50 UG/ML
1 SOLUTION/ DROPS OPHTHALMIC
Status: DISCONTINUED | OUTPATIENT
Start: 2018-11-04 | End: 2018-11-07 | Stop reason: HOSPADM

## 2018-11-04 RX ORDER — HEPARIN 100 UNIT/ML
300 SYRINGE INTRAVENOUS
Status: COMPLETED | OUTPATIENT
Start: 2018-11-04 | End: 2018-11-04

## 2018-11-04 RX ORDER — ONDANSETRON 2 MG/ML
4 INJECTION INTRAMUSCULAR; INTRAVENOUS
Status: COMPLETED | OUTPATIENT
Start: 2018-11-04 | End: 2018-11-04

## 2018-11-04 RX ORDER — LEVOFLOXACIN 5 MG/ML
750 INJECTION, SOLUTION INTRAVENOUS
Status: DISCONTINUED | OUTPATIENT
Start: 2018-11-05 | End: 2018-11-07 | Stop reason: HOSPADM

## 2018-11-04 RX ORDER — NATEGLINIDE 120 MG/1
60 TABLET ORAL
Status: DISCONTINUED | OUTPATIENT
Start: 2018-11-04 | End: 2018-11-07 | Stop reason: HOSPADM

## 2018-11-04 RX ORDER — ACETAMINOPHEN 325 MG/1
650 TABLET ORAL
Status: DISCONTINUED | OUTPATIENT
Start: 2018-11-04 | End: 2018-11-07 | Stop reason: HOSPADM

## 2018-11-04 RX ORDER — ACETAMINOPHEN 325 MG/1
650 TABLET ORAL ONCE
Status: COMPLETED | OUTPATIENT
Start: 2018-11-04 | End: 2018-11-04

## 2018-11-04 RX ORDER — CALCIUM CARBONATE 600 MG
600 TABLET ORAL DAILY
COMMUNITY

## 2018-11-04 RX ORDER — ONDANSETRON 2 MG/ML
4 INJECTION INTRAMUSCULAR; INTRAVENOUS
Status: DISCONTINUED | OUTPATIENT
Start: 2018-11-04 | End: 2018-11-07 | Stop reason: HOSPADM

## 2018-11-04 RX ORDER — MAGNESIUM SULFATE 100 %
4 CRYSTALS MISCELLANEOUS AS NEEDED
Status: DISCONTINUED | OUTPATIENT
Start: 2018-11-04 | End: 2018-11-07 | Stop reason: HOSPADM

## 2018-11-04 RX ORDER — DEXTROSE 50 % IN WATER (D50W) INTRAVENOUS SYRINGE
12.5-25 AS NEEDED
Status: DISCONTINUED | OUTPATIENT
Start: 2018-11-04 | End: 2018-11-07 | Stop reason: HOSPADM

## 2018-11-04 RX ORDER — NATEGLINIDE 120 MG/1
60 TABLET ORAL DAILY
Status: DISCONTINUED | OUTPATIENT
Start: 2018-11-05 | End: 2018-11-04

## 2018-11-04 RX ORDER — SODIUM CHLORIDE 9 MG/ML
130 INJECTION, SOLUTION INTRAVENOUS CONTINUOUS
Status: DISCONTINUED | OUTPATIENT
Start: 2018-11-04 | End: 2018-11-05

## 2018-11-04 RX ORDER — SODIUM CHLORIDE 0.9 % (FLUSH) 0.9 %
5-10 SYRINGE (ML) INJECTION AS NEEDED
Status: DISCONTINUED | OUTPATIENT
Start: 2018-11-04 | End: 2018-11-07 | Stop reason: HOSPADM

## 2018-11-04 RX ORDER — SODIUM CHLORIDE 0.9 % (FLUSH) 0.9 %
5-10 SYRINGE (ML) INJECTION EVERY 8 HOURS
Status: DISCONTINUED | OUTPATIENT
Start: 2018-11-04 | End: 2018-11-07 | Stop reason: HOSPADM

## 2018-11-04 RX ORDER — MELATONIN
1000 DAILY
Status: DISCONTINUED | OUTPATIENT
Start: 2018-11-05 | End: 2018-11-07 | Stop reason: HOSPADM

## 2018-11-04 RX ORDER — LATANOPROST 50 UG/ML
1 SOLUTION/ DROPS OPHTHALMIC EVERY EVENING
Status: DISCONTINUED | OUTPATIENT
Start: 2018-11-04 | End: 2018-11-04

## 2018-11-04 RX ORDER — INSULIN LISPRO 100 [IU]/ML
INJECTION, SOLUTION INTRAVENOUS; SUBCUTANEOUS
Status: DISCONTINUED | OUTPATIENT
Start: 2018-11-04 | End: 2018-11-07 | Stop reason: HOSPADM

## 2018-11-04 RX ORDER — DICLOFENAC SODIUM 10 MG/G
2 GEL TOPICAL
Status: DISCONTINUED | OUTPATIENT
Start: 2018-11-04 | End: 2018-11-07 | Stop reason: HOSPADM

## 2018-11-04 RX ORDER — HYDROCORTISONE SODIUM SUCCINATE 100 MG/2ML
50 INJECTION, POWDER, FOR SOLUTION INTRAMUSCULAR; INTRAVENOUS EVERY 8 HOURS
Status: DISCONTINUED | OUTPATIENT
Start: 2018-11-04 | End: 2018-11-06

## 2018-11-04 RX ADMIN — AMIODARONE HYDROCHLORIDE 1 MG/MIN: 50 INJECTION, SOLUTION INTRAVENOUS at 19:47

## 2018-11-04 RX ADMIN — Medication 1 SPRAY: at 11:46

## 2018-11-04 RX ADMIN — HYDROCORTISONE SODIUM SUCCINATE 50 MG: 100 INJECTION, POWDER, FOR SOLUTION INTRAMUSCULAR; INTRAVENOUS at 13:58

## 2018-11-04 RX ADMIN — SODIUM CHLORIDE 100 ML/HR: 900 INJECTION, SOLUTION INTRAVENOUS at 14:02

## 2018-11-04 RX ADMIN — INSULIN LISPRO 4 UNITS: 100 INJECTION, SOLUTION INTRAVENOUS; SUBCUTANEOUS at 22:00

## 2018-11-04 RX ADMIN — SODIUM CHLORIDE 1000 ML: 900 INJECTION, SOLUTION INTRAVENOUS at 11:47

## 2018-11-04 RX ADMIN — LATANOPROST 1 DROP: 50 SOLUTION OPHTHALMIC at 22:00

## 2018-11-04 RX ADMIN — AMIODARONE HYDROCHLORIDE 150 MG: 50 INJECTION, SOLUTION INTRAVENOUS at 19:26

## 2018-11-04 RX ADMIN — SODIUM CHLORIDE, PRESERVATIVE FREE 300 UNITS: 5 INJECTION INTRAVENOUS at 01:20

## 2018-11-04 RX ADMIN — HYDROCORTISONE SODIUM SUCCINATE 50 MG: 100 INJECTION, POWDER, FOR SOLUTION INTRAMUSCULAR; INTRAVENOUS at 21:54

## 2018-11-04 RX ADMIN — INSULIN LISPRO 2 UNITS: 100 INJECTION, SOLUTION INTRAVENOUS; SUBCUTANEOUS at 17:35

## 2018-11-04 RX ADMIN — SODIUM CHLORIDE 500 ML: 900 INJECTION, SOLUTION INTRAVENOUS at 18:54

## 2018-11-04 RX ADMIN — ACETAMINOPHEN 650 MG: 325 TABLET ORAL at 13:16

## 2018-11-04 RX ADMIN — NATEGLINIDE 60 MG: 120 TABLET, FILM COATED ORAL at 17:38

## 2018-11-04 RX ADMIN — ONDANSETRON 4 MG: 2 INJECTION INTRAMUSCULAR; INTRAVENOUS at 13:16

## 2018-11-04 RX ADMIN — CEFEPIME HYDROCHLORIDE 2 G: 2 INJECTION, POWDER, FOR SOLUTION INTRAVENOUS at 14:02

## 2018-11-04 RX ADMIN — APIXABAN 5 MG: 5 TABLET, FILM COATED ORAL at 17:36

## 2018-11-04 RX ADMIN — CEFEPIME HYDROCHLORIDE 2 G: 2 INJECTION, POWDER, FOR SOLUTION INTRAVENOUS at 21:54

## 2018-11-04 RX ADMIN — Medication 10 ML: at 21:55

## 2018-11-04 NOTE — H&P
Hospitalist Admission NoteNAME: Jamal Organ :  1931 MRN:  [id number] Date/Time:  2018 1:35 PM 
 
Patient PCP: Linda Plata MD  
Cardiologist:  Dr. Torito Lovett 
______________________________________________________________________ Assessment & Plan: 
Left mid and lower lung community acquired pneumonia, POA 
--fever, WBC 11.7. Dx yesterday. --continue levaquin (patient took a dose this AM PTA). Add cefepime for double pseudomonal coverage since he is immunocompromised due to chemotherapy. Acute hypoxic respiratory failure, POA 
--88% RA. Supplemental O2 
--CTA chest 10/31 negative for PE. Already on eliquis for p. Afib. Paroxysmal afib/flutter 
--continue amiodarone and eliquis. 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 
--on prednisone taper currently at 5mg and also on hydrocortisone which was reduced from 10mg bid to 10mg daily. --due to acute illness, hyponatremia, generalized weakness and low BP, will put on stress dose steroid with hydrocortisone 50mg q8h Steroid induced diabetes --continue starlix 60mg. Patient on 60mg daily currently but with increased in steroid, will increase back to 60mg tid. Add low dose SSI. NIXON  
--suspect due to dehydration. Cr 1.3, was recently 1.5. Baseline Cr 1 to 1.1 in 2016 
--IVF NS 100ml/hr.   
--hold lasix Acute hypovolemic hyponatremia  Na 128 
--hold lasix 
--IVF with NS. Got 1L bolus this afternoon but also had 1.5L earlier from ER visit yesterday/this AM.  Maintenance fluid with 100ml/hr ? Chronic diastolic CHF 
 --EF 3437 low normal 50%, hypertrophy. Hold lasix. --check echo Hx prostate cancer Hx renal cell ca s/p right nephrectomy --in remission Hydrocoel Body mass index is 27.37 kg/m². Code: full DVT prophylaxis: eliquis Surrogate decision maker:  wife Subjective: CHIEF COMPLAINT:  JARQUNI, generalized weakness HISTORY OF PRESENT ILLNESS:    
Romaine Hernandez is a 80 y.o.  male with metastatic melanoma currently on chemotherapy with opdivo which he last received on 11/1. Patient seen in ER 10/31 for JARQUIN. CTA chest negative for PE or PNA. Greg Riggs was 1900 and was told to f/u cardiology for echo. Seen in ER again last night for SOB, fever 102, generalized weakness, s/p fall. CXR showed left mid and lower lung PNA. and discharged on levaquin. He was very weak, required 2 person assist to walk. Woke up at 5:30am today and vomited x 1, was too sit up in bed. In ER O2 sat 88% RA. Denies chest pain, abdominal pain, diarrhea, bleeding.  + mild cough, nonproductive. + Difficulty urinating due to hydrocoele. We were asked to admit for work up and evaluation of the above problems. Past Medical History:  
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 hodges, radiologist  
 Atrial flutter [facility])   
 s/p cardioversion  Cancer [facility]) 2013 LEFT FOOT ,MELANOMA  Cancer ([address] 1980 RIGHT KIDNEY  
 Cancer [facility]) 1988 PROSTATE  TREATED WITH RADIATION  Chronic pain BACK AND RIGHT HIP  
 Diabetes mellitus, stable ([address] 7/13/2017 Steroid induced  Hyperglycemia 7/13/2017 Steroid induced: follow up A1C, BMP, discontinue Starlix in wnl  LBP (low back pain)  Left hip pain 7/13/2017 Xray shows no obvious Fx or Metastatic lesions, this pain could be arthritic in nature or radicular, will give a corticosteroid injection, if no benefit may need Ortho eval  
 Malignant neoplasm of prostate ([address] 7/13/2017  OA (osteoarthritis)  Prostate cancer ([address]  Pure hyperglyceridemia 7/13/2017  Renal cell cancer (right)  Small bowel obstruction ([address] 7/13/2017 Past Surgical History:  
Procedure Laterality Date [address] MITRAL VALVE REPAIR  
 [address]  HX CATARACT REMOVAL Bilateral 2011  HX LUMBAR LAMINECTOMY    
 HX ORTHOPAEDIC    
 CARPAL TUNNEL-SERAFIN  HX OTHER SURGICAL  OCT 2014 MELANOMA REMOVED FROM HEEL  
 [address] TURP  
 HX UROLOGICAL Right NEPHRECTOMY  OR COLSC FLX W/REMOVAL LESION BY HOT BX FORCEPS  2012  OR SIGMOIDOSCOPY FLX DX W/COLLJ SPEC BR/WA IF PFRMD  2012 Social History Tobacco Use  Smoking status: Former Smoker Packs/day: 0.25 Years: 2.00 Pack years: 0.50 Last attempt to quit: 1953 Years since quittin.5  Smokeless tobacco: Never Used Substance Use Topics  Alcohol use: No  
  
Drug use:   
 
 
Family History Problem Relation Age of Onset  Diabetes Father  Dementia Mother  Cancer Sister BRAIN  
 Heart Disease Brother  Arthritis-osteo Sister  Anesth Problems Neg Hx Allergies Allergen Reactions  Relafen [Nabumetone] Itching Prior to Admission medications Medication Sig Start Date End Date Taking? Authorizing Provider  
calcium carbonate (CALCIUM 600) 600 mg calcium (1,500 mg) tablet Take 600 mg by mouth daily. Yes Provider, Historical  
levoFLOXacin (LEVAQUIN) 750 mg tablet Take 1 Tab by mouth daily. 11/3/18  Yes Camila Ho MD  
nateglinide (STARLIX) 60 mg tablet TAKE 1 TABLET BY MOUTH 3 TIMES A DAY WITH MEALS Patient taking differently: TAKE 1 TABLET BY MOUTH 3 TIMES A DAY WITH MEALS. Now taking once daily 18  Yes Beatris Bates MD  
furosemide (LASIX) 20 mg tablet TAKE 1 TABLET BY MOUTH EVERY OTHER DAY OR AS DIRECTED; Mon, Wed, Fri 17  Yes Provider, Historical  
hydrocortisone (CORTEF) 10 mg tablet TAKE 1 TABLET BY MOUTH TWICE A DAY.  now daily 17  Yes Provider, Historical  
latanoprost (XALATAN) 0.005 % ophthalmic solution INSTILL 1 DROP BY OPHTHALMIC ROUTE EVERY DAY INTO BOTH EYES AT BEDTIME 17  Yes Provider, Historical  
 predniSONE (DELTASONE) 10 mg tablet 6 tabs day 1, 5 tabs day 2, 4 tabs day 3, 3 tabs day 4, 2 tabs day 5, 1 tab day 6 Patient taking differently: Take 10 mg by mouth daily. 6 tabs day 1, 5 tabs day 2, 4 tabs day 3, 3 tabs day 4, 2 tabs day 5, 1 tab day 6 1/10/18  Yes Bruna Gibbs MD  
PREVIDENT 5000 BOOSTER PLUS 1.1 % pste BRUSH AFTER BREAKFAST AND SUPPER CAN ALSO USE IN FLUORIDE TRAY 6/9/17  Yes Provider, Historical  
diclofenac (VOLTAREN) 1 % gel Apply 2 g to affected area four (4) times daily as needed. 7/19/17  Yes Bruna Gibbs MD  
bimatoprost (LUMIGAN) 0.01 % ophthalmic drops Administer 1 Drop to both eyes nightly. Yes Provider, Historical  
cholecalciferol (VITAMIN D3) 1,000 unit tablet Take 1,000 Units by mouth daily. Yes Provider, Historical  
apixaban (ELIQUIS) 5 mg tablet Take 5 mg by mouth two (2) times a day. Yes Cheri, MD Nicki  
acetaminophen (TYLENOL EXTRA STRENGTH) 500 mg tablet Take 500 mg by mouth every six (6) hours as needed for Pain. Yes Provider, Historical  
amiodarone (CORDARONE) 200 mg tablet Take 100 mg by mouth daily. Yes Other, MD Nicki  
 
REVIEW OF SYSTEMS:  POSITIVE= Bold. Negative = normal text General:  fever, chills, sweats, generalized weakness, weight loss/gain, loss of appetite Eyes:  blurred vision, eye pain, loss of vision, diplopia Ear Nose and Throat:  rhinorrhea, pharyngitis Respiratory:   cough, sputum production, SOB, wheezing, JARQUIN, pleuritic pain 
Cardiology:  chest pain, palpitations, orthopnea, PND, edema, syncope Gastrointestinal:  abdominal pain, N/V, dysphagia, diarrhea, constipation, bleeding Genitourinary:  frequency, urgency, hesitancy, dysuria, hematuria, incontinence Muskuloskeletal :  arthralgia, myalgia Hematology:  easy bruising, bleeding, lymphadenopathy Dermatological:  rash, ulceration, pruritis Endocrine:  hot flashes or polydipsia Neurological:  headache, dizziness, confusion, focal weakness, paresthesia, memory loss, gait disturbance Psychological: anxiety, depression, agitation Objective: VITALS:   
Visit Vitals /59 Pulse 72 Temp 100.2 °F (37.9 °C) Resp 23 Ht 5' 8\" (1.727 m) Wt 81.6 kg (180 lb) SpO2 94% BMI 27.37 kg/m² Temp (24hrs), Av.4 °F (38.6 °C), Min:100.2 °F (37.9 °C), Max:102.6 °F (39.2 °C) Body mass index is 27.37 kg/m². PHYSICAL EXAM: 
 
General:    Alert, ill appearing, +rigors, cooperative, appears stated age. HEENT: Atraumatic, anicteric sclerae, pink conjunctivae No oral ulcers, mucosa moist, throat clear. Hearing intact. Neck:  Supple, symmetrical,  thyroid: non tender Lungs:   Crackles in left base and left mid lung otherwise clear. No wheezing or Rhonchi. Chest wall:  No tenderness  No Accessory muscle use. Left port. Heart:   irregular  Rhythm, on monitor SR with PACs,  No  murmur   No gallop. No edema. Abdomen:   Soft, distended mildly, nontender. Bowel sounds normal. No masses Extremities: No cyanosis. No clubbing Skin:     Not pale Not Jaundiced  No rashes Psych:  Good insight. Not depressed. Not anxious or agitated. Neurologic: EOMs intact. No facial asymmetry. No aphasia or slurred speech. Symmetrical strength 3/5, unable to sit up without 2 person assist, Alert and oriented X 3. Peripheral pulse: Bilateral, DP, 2+ Capillary refill:  normal 
 
IMAGING RESULTS: 
 []       I have personally reviewed the actual   []     CXR  []     CT scan CXR: 
CT : 
EKG: 
 ________________________________________________________________________ Care Plan discussed with: 
  Comments Patient Family RN Care Manager Consultant:     
________________________________________________________________________ Prophylaxis: 
GI pepcid DVT eliquis  
________________________________________________________________________ Recommended Disposition:  
Home with Family y HH/PT/OT/RN y  
SNF/LTC MELONY   
________________________________________________________________________ Code Status: 
Full Code y  
DNR/DNI   
________________________________________________________________________ TOTAL TIME:  60 minutes Comments  
 y Reviewed previous records  
>50% of visit spent in counseling and coordination of care  Discussion with patient and/or family and questions answered 
  
 
 
______________________________________________________________________ Melvin Banerjee MD 
 
 
Procedures: see electronic medical records for all procedures/Xrays and details which were not copied into this note but were reviewed prior to creation of Plan. LAB DATA REVIEWED:   
Recent Results (from the past 24 hour(s)) CULTURE, BLOOD, PAIRED Collection Time: 11/03/18  9:01 PM  
Result Value Ref Range Special Requests: NO SPECIAL REQUESTS Culture result: NO GROWTH AFTER 9 HOURS    
EKG, 12 LEAD, INITIAL Collection Time: 11/03/18  9:45 PM  
Result Value Ref Range Ventricular Rate 76 BPM  
 Atrial Rate 76 BPM  
 P-R Interval 150 ms QRS Duration 144 ms Q-T Interval 426 ms  
 QTC Calculation (Bezet) 479 ms Calculated P Axis 14 degrees Calculated R Axis -57 degrees Calculated T Axis 27 degrees Diagnosis Sinus rhythm with premature supraventricular complexes Possible Left atrial enlargement Left axis deviation Right bundle branch block Inferior infarct (cited on or before 02-OCT-2014) When compared with ECG of 31-OCT-2018 15:48, 
premature supraventricular complexes are now present Confirmed by oY Mena ([id number]) on 11/4/2018 12:06:36 PM 
  
CBC WITH AUTOMATED DIFF Collection Time: 11/03/18 10:01 PM  
Result Value Ref Range WBC 10.0 4.1 - 11.1 K/uL  
 RBC 5.34 4.10 - 5.70 M/uL  
 HGB 16.0 12.1 - 17.0 g/dL HCT 49.4 36.6 - 50.3 % MCV 92.5 80.0 - 99.0 FL  
 MCH 30.0 26.0 - 34.0 PG  
 MCHC 32.4 30.0 - 36.5 g/dL  
 RDW 14.0 11.5 - 14.5 % PLATELET 658 397 - 859 K/uL MPV 11.1 8.9 - 12.9 FL  
 NRBC 0.0 0  WBC ABSOLUTE NRBC 0.00 0.00 - 0.01 K/uL NEUTROPHILS 83 (H) 32 - 75 % LYMPHOCYTES 9 (L) 12 - 49 % MONOCYTES 5 5 - 13 % EOSINOPHILS 2 0 - 7 % BASOPHILS 0 0 - 1 % IMMATURE GRANULOCYTES 1 (H) 0.0 - 0.5 % ABS. NEUTROPHILS 8.3 (H) 1.8 - 8.0 K/UL  
 ABS. LYMPHOCYTES 0.9 0.8 - 3.5 K/UL  
 ABS. MONOCYTES 0.5 0.0 - 1.0 K/UL  
 ABS. EOSINOPHILS 0.2 0.0 - 0.4 K/UL  
 ABS. BASOPHILS 0.0 0.0 - 0.1 K/UL  
 ABS. IMM. GRANS. 0.1 (H) 0.00 - 0.04 K/UL  
 DF AUTOMATED METABOLIC PANEL, COMPREHENSIVE Collection Time: 11/03/18 10:01 PM  
Result Value Ref Range Sodium 128 (L) 136 - 145 mmol/L Potassium 4.7 3.5 - 5.1 mmol/L Chloride 94 (L) 97 - 108 mmol/L  
 CO2 27 21 - 32 mmol/L Anion gap 7 5 - 15 mmol/L Glucose 120 (H) 65 - 100 mg/dL BUN 30 (H) 6 - 20 MG/DL Creatinine 1.50 (H) 0.70 - 1.30 MG/DL  
 BUN/Creatinine ratio 20 12 - 20 GFR est AA 54 (L) >60 ml/min/1.73m2 GFR est non-AA 44 (L) >60 ml/min/1.73m2 Calcium 9.5 8.5 - 10.1 MG/DL Bilirubin, total 1.2 (H) 0.2 - 1.0 MG/DL  
 ALT (SGPT) 20 12 - 78 U/L  
 AST (SGOT) 19 15 - 37 U/L Alk. phosphatase 73 45 - 117 U/L Protein, total 8.5 (H) 6.4 - 8.2 g/dL Albumin 3.9 3.5 - 5.0 g/dL Globulin 4.6 (H) 2.0 - 4.0 g/dL A-G Ratio 0.8 (L) 1.1 - 2.2 INFLUENZA A & B AG (RAPID TEST) Collection Time: 11/03/18 10:35 PM  
Result Value Ref Range Influenza A Antigen NEGATIVE  NEG Influenza B Antigen NEGATIVE  NEG    
POC LACTIC ACID Collection Time: 11/04/18 12:06 PM  
Result Value Ref Range Lactic Acid (POC) 1.18 0.40 - 2.00 mmol/L  
CBC WITH AUTOMATED DIFF Collection Time: 11/04/18 12:20 PM  
Result Value Ref Range WBC 11.7 (H) 4.1 - 11.1 K/uL  
 RBC 4.42 4.10 - 5.70 M/uL  
 HGB 13.2 12.1 - 17.0 g/dL HCT 40.0 36.6 - 50.3 % MCV 90.5 80.0 - 99.0 FL  
 MCH 29.9 26.0 - 34.0 PG  
 MCHC 33.0 30.0 - 36.5 g/dL  
 RDW 13.8 11.5 - 14.5 % PLATELET 933 (L) 731 - 400 K/uL MPV 11.0 8.9 - 12.9 FL  
 NRBC 0.2 (H) 0  WBC ABSOLUTE NRBC 0.02 (H) 0.00 - 0.01 K/uL NEUTROPHILS 90 (H) 32 - 75 % LYMPHOCYTES 5 (L) 12 - 49 % MONOCYTES 3 (L) 5 - 13 % EOSINOPHILS 1 0 - 7 % BASOPHILS 0 0 - 1 % IMMATURE GRANULOCYTES 1 (H) 0.0 - 0.5 % ABS. NEUTROPHILS 10.5 (H) 1.8 - 8.0 K/UL  
 ABS. LYMPHOCYTES 0.6 (L) 0.8 - 3.5 K/UL  
 ABS. MONOCYTES 0.4 0.0 - 1.0 K/UL  
 ABS. EOSINOPHILS 0.1 0.0 - 0.4 K/UL  
 ABS. BASOPHILS 0.0 0.0 - 0.1 K/UL  
 ABS. IMM. GRANS. 0.1 (H) 0.00 - 0.04 K/UL  
 DF SMEAR SCANNED    
 RBC COMMENTS NORMOCYTIC, NORMOCHROMIC METABOLIC PANEL, COMPREHENSIVE Collection Time: 11/04/18 12:20 PM  
Result Value Ref Range Sodium 129 (L) 136 - 145 mmol/L Potassium 4.6 3.5 - 5.1 mmol/L Chloride 97 97 - 108 mmol/L  
 CO2 26 21 - 32 mmol/L Anion gap 6 5 - 15 mmol/L Glucose 148 (H) 65 - 100 mg/dL BUN 23 (H) 6 - 20 MG/DL Creatinine 1.30 0.70 - 1.30 MG/DL  
 BUN/Creatinine ratio 18 12 - 20 GFR est AA >60 >60 ml/min/1.73m2 GFR est non-AA 52 (L) >60 ml/min/1.73m2 Calcium 7.8 (L) 8.5 - 10.1 MG/DL Bilirubin, total 1.1 (H) 0.2 - 1.0 MG/DL  
 ALT (SGPT) 15 12 - 78 U/L  
 AST (SGOT) 15 15 - 37 U/L Alk. phosphatase 54 45 - 117 U/L Protein, total 6.0 (L) 6.4 - 8.2 g/dL Albumin 2.6 (L) 3.5 - 5.0 g/dL Globulin 3.4 2.0 - 4.0 g/dL A-G Ratio 0.8 (L) 1.1 - 2.2

## 2018-11-04 NOTE — PROGRESS NOTES
Pt arrived to the floor, Pt hooked to tele box. Pt c/o urge to urinate with no results. BS showed 480, Called Dr. Perla Means and obtained an order for in and out straight cath. Pt eating lunch at present will see if he can void on his own. Dr Perla Means wants us to keep an eye on urinary status. Primary Nurse Ness Unger RN and Eliu Brice RN performed a dual skin assessment on this patient No impairment noted Horacio score is 22 
 
1600- Pt able to void 150ml in the urinal, pt still feels uncomfortable like he still needs to void. 1615- Pt straight cathed with results of 700ml urine. Pt states he feels much better. Bedside shift change report given to Jamila Crowe RN (oncoming nurse) by Eliu Brice RN & Gurmeet Galindo RN (offgoing nurse). Report included the following information SBAR, Kardex, ED Summary, Intake/Output, MAR, Recent Results, Med Rec Status and Cardiac Rhythm A FIB.

## 2018-11-04 NOTE — ED PROVIDER NOTES
EMERGENCY DEPARTMENT HISTORY AND PHYSICAL EXAM 
 
 
Date: 11/3/2018 Patient Name: Marques Resendiz History of Presenting Illness Chief Complaint Patient presents with  Shortness of Breath Pt wheeled to triage with c/o SOB x today; family also notes fever; family states GLF x this afternoon-denies hitting head or any LOC  Fatigue  
  weakness, fever, which has increased over the day especially after GLF; 102.3 temp at home, has not had medication History Provided By: Patient and Patient's Son HPI: Marques Resendiz, 80 y.o. male with PMHx significant for prostate cancer, right kidney cancer, atrial flutter, aneurysm, DM, hyperglycemia, presents ambulatory to the ED with cc of generalized weakness, ongoing for 1 day. Pt reports fever of 102 F earlier today, decreased urine, fatigue, decreased appetite, and SOB alongside cc. Of note, he has a temperature of 102.6 in ED currently. Son notes a BP of 130/60 and blood sugar of 118 taken earlier today. Pt's son discloses that pt is in remission from prostate cancer and has been receiving chemotherapy for 2 years, with his last treatment yesterday. Additionally, pt was recently taken off hydrocortisone yesterday. Pt's son states that he had a GLF 2 days ago due to weakness with no LOC or head trauma. He denies any alleviating or exacerbating factors. Pt specifically denies any CP, nausea, vomiting, abdominal pain, chills, HA, lightheadedness, or diarrhea. There are no other complaints, changes, or physical findings at this time. PCP: Celia Ramires MD 
 
Current Facility-Administered Medications Medication Dose Route Frequency Provider Last Rate Last Dose  levoFLOXacin (LEVAQUIN) 750 mg in D5W IVPB  750 mg IntraVENous NOW Brii Agustin MD      
 
Current Outpatient Medications Medication Sig Dispense Refill  nateglinide (STARLIX) 60 mg tablet TAKE 1 TABLET BY MOUTH 3 TIMES A DAY WITH MEALS 90 Tab 3  
  furosemide (LASIX) 20 mg tablet TAKE 1 TABLET BY MOUTH EVERY OTHER DAY OR AS DIRECTED  2  
 hydrocortisone (CORTEF) 10 mg tablet TAKE 1 TABLET BY MOUTH TWICE A DAY  10  
 latanoprost (XALATAN) 0.005 % ophthalmic solution INSTILL 1 DROP BY OPHTHALMIC ROUTE EVERY DAY INTO BOTH EYES AT BEDTIME  5  
 predniSONE (DELTASONE) 10 mg tablet 6 tabs day 1, 5 tabs day 2, 4 tabs day 3, 3 tabs day 4, 2 tabs day 5, 1 tab day 6 21 Tab 0  
 PREVIDENT 5000 BOOSTER PLUS 1.1 % pste BRUSH AFTER BREAKFAST AND SUPPER CAN ALSO USE IN FLUORIDE TRAY  12  
 diclofenac (VOLTAREN) 1 % gel Apply 2 g to affected area four (4) times daily as needed. 100 g 0  
 hydrocortisone (CORTEF) 20 mg tablet Take 1 Tab by mouth two (2) times daily (with meals). 60 Tab 0  
 spironolactone (ALDACTONE) 50 mg tablet Take 1 Tab by mouth daily. 30 Tab 0  
 bimatoprost (LUMIGAN) 0.01 % ophthalmic drops Administer 1 Drop to both eyes nightly.  cholecalciferol (VITAMIN D3) 1,000 unit tablet Take 1,000 Units by mouth daily.  apixaban (ELIQUIS) 5 mg tablet Take 5 mg by mouth two (2) times a day.  acetaminophen (TYLENOL EXTRA STRENGTH) 500 mg tablet Take 500 mg by mouth every six (6) hours as needed for Pain.  amiodarone (CORDARONE) 200 mg tablet Take 100 mg by mouth daily.  lidocaine (LIDODERM) 5 %(700 mg/patch) 1 Patch by TransDERmal route every twelve (12) hours as needed. Past History Past Medical History: 
Past Medical History:  
Diagnosis Date  Aneurysm ([facility] 10/1/14 BRAIN RECENT DX , followed by dr. hodges, radiologist  
 Atrial flutter [facility])   
 s/p cardioversion  Cancer [facility]) 2013 LEFT FOOT ,MELANOMA  Cancer ([facility] 1980 RIGHT KIDNEY  
 Cancer [facility]) 1988 PROSTATE  TREATED WITH RADIATION  Chronic pain BACK AND RIGHT HIP  
 Diabetes mellitus, stable ([facility] 7/13/2017 Steroid induced  Hyperglycemia 7/13/2017 Steroid induced: follow up A1C, BMP, discontinue Starlix in wnl  LBP (low back pain)  Left hip pain 2017 Xray shows no obvious Fx or Metastatic lesions, this pain could be arthritic in nature or radicular, will give a corticosteroid injection, if no benefit may need Ortho eval  
 Malignant neoplasm of prostate ([address] 2017  OA (osteoarthritis)  Prostate cancer ([address]  Pure hyperglyceridemia 2017  Renal cell cancer (right)  Small bowel obstruction ([address] 2017 Past Surgical History: 
Past Surgical History:  
Procedure Laterality Date [address] [facility] 635 MITRAL VALVE REPAIR  
 [address]  HX CATARACT REMOVAL Bilateral   HX LUMBAR LAMINECTOMY    
 HX ORTHOPAEDIC    
 CARPAL TUNNEL-SERAFIN  HX OTHER SURGICAL  OCT 2014 MELANOMA REMOVED FROM HEEL  
 [address] [facility] TURP  
 HX UROLOGICAL Right NEPHRECTOMY  OK COLSC FLX W/REMOVAL LESION BY HOT BX FORCEPS  2012  OK SIGMOIDOSCOPY FLX DX W/COLLJ SPEC BR/WA IF PFRMD  2012 Family History: 
Family History Problem Relation Age of Onset  Diabetes Father  Dementia Mother  Cancer Sister BRAIN  
 Heart Disease Brother  Arthritis-osteo Sister  Anesth Problems Neg Hx Social History: 
Social History Tobacco Use  Smoking status: Former Smoker Packs/day: 0.25 Years: 2.00 Pack years: 0.50 Last attempt to quit: 1953 Years since quittin.5  Smokeless tobacco: Never Used Substance Use Topics  Alcohol use: No  
 Drug use: No  
 
 
Allergies: Allergies Allergen Reactions  Relafen [Nabumetone] Itching Review of Systems Review of Systems Constitutional: Positive for appetite change (decreased), fatigue and fever. Negative for activity change, chills and unexpected weight change. HENT: Negative for congestion. Eyes: Negative for pain and visual disturbance. Respiratory: Positive for shortness of breath. Negative for cough. Cardiovascular: Negative for chest pain. Gastrointestinal: Negative for abdominal pain, diarrhea, nausea and vomiting. Genitourinary: Positive for decreased urine volume. Negative for dysuria. Musculoskeletal: Negative for back pain. Skin: Negative for rash. Neurological: Positive for weakness (generalized). Negative for dizziness, light-headedness, numbness and headaches. Physical Exam  
Physical Exam  
Constitutional: He is oriented to person, place, and time. He appears well-developed and well-nourished. He appears distressed (minimal). Elderly male HENT:  
Head: Normocephalic and atraumatic. Mouth/Throat: Oropharynx is clear and moist.  
Eyes: Conjunctivae and EOM are normal. Pupils are equal, round, and reactive to light. Right eye exhibits no discharge. Left eye exhibits no discharge. Neck: Normal range of motion. Neck supple. Cardiovascular: Normal rate, regular rhythm and normal heart sounds. No murmur heard. Pulmonary/Chest: Effort normal and breath sounds normal. No respiratory distress. He has no wheezes. He has no rhonchi. He has no rales. Abdominal: Soft. Bowel sounds are normal. He exhibits no distension. There is no tenderness. There is no rebound and no guarding. Musculoskeletal: Normal range of motion. He exhibits no edema. No LE edema Neurological: He is alert and oriented to person, place, and time. No cranial nerve deficit. He exhibits normal muscle tone. Skin: Skin is warm and dry. No rash noted. He is not diaphoretic. Warm to touch Extensive bruising in R forearm Nursing note and vitals reviewed. Diagnostic Study Results Labs - Recent Results (from the past 12 hour(s)) CBC WITH AUTOMATED DIFF Collection Time: 11/03/18 10:01 PM  
Result Value Ref Range WBC 10.0 4.1 - 11.1 K/uL  
 RBC 5.34 4.10 - 5.70 M/uL  
 HGB 16.0 12.1 - 17.0 g/dL HCT 49.4 36.6 - 50.3 % MCV 92.5 80.0 - 99.0 FL  
 MCH 30.0 26.0 - 34.0 PG  
 MCHC 32.4 30.0 - 36.5 g/dL  
 RDW 14.0 11.5 - 14.5 % PLATELET 991 287 - 440 K/uL MPV 11.1 8.9 - 12.9 FL  
 NRBC 0.0 0  WBC ABSOLUTE NRBC 0.00 0.00 - 0.01 K/uL NEUTROPHILS 83 (H) 32 - 75 % LYMPHOCYTES 9 (L) 12 - 49 % MONOCYTES 5 5 - 13 % EOSINOPHILS 2 0 - 7 % BASOPHILS 0 0 - 1 % IMMATURE GRANULOCYTES 1 (H) 0.0 - 0.5 % ABS. NEUTROPHILS 8.3 (H) 1.8 - 8.0 K/UL  
 ABS. LYMPHOCYTES 0.9 0.8 - 3.5 K/UL  
 ABS. MONOCYTES 0.5 0.0 - 1.0 K/UL  
 ABS. EOSINOPHILS 0.2 0.0 - 0.4 K/UL  
 ABS. BASOPHILS 0.0 0.0 - 0.1 K/UL  
 ABS. IMM. GRANS. 0.1 (H) 0.00 - 0.04 K/UL  
 DF AUTOMATED METABOLIC PANEL, COMPREHENSIVE Collection Time: 11/03/18 10:01 PM  
Result Value Ref Range Sodium 128 (L) 136 - 145 mmol/L Potassium 4.7 3.5 - 5.1 mmol/L Chloride 94 (L) 97 - 108 mmol/L  
 CO2 27 21 - 32 mmol/L Anion gap 7 5 - 15 mmol/L Glucose 120 (H) 65 - 100 mg/dL BUN 30 (H) 6 - 20 MG/DL Creatinine 1.50 (H) 0.70 - 1.30 MG/DL  
 BUN/Creatinine ratio 20 12 - 20 GFR est AA 54 (L) >60 ml/min/1.73m2 GFR est non-AA 44 (L) >60 ml/min/1.73m2 Calcium 9.5 8.5 - 10.1 MG/DL Bilirubin, total 1.2 (H) 0.2 - 1.0 MG/DL  
 ALT (SGPT) 20 12 - 78 U/L  
 AST (SGOT) 19 15 - 37 U/L Alk. phosphatase 73 45 - 117 U/L Protein, total 8.5 (H) 6.4 - 8.2 g/dL Albumin 3.9 3.5 - 5.0 g/dL Globulin 4.6 (H) 2.0 - 4.0 g/dL A-G Ratio 0.8 (L) 1.1 - 2.2 INFLUENZA A & B AG (RAPID TEST) Collection Time: 11/03/18 10:35 PM  
Result Value Ref Range Influenza A Antigen NEGATIVE  NEG Influenza B Antigen NEGATIVE  NEG Radiologic Studies - CXR Results  (Last 48 hours) 11/03/18 2302  XR CHEST PORT Final result Impression:  IMPRESSION:  
   
Left pneumonia is new versus more conspicuous since 3 days ago. Recommend  
followup PA and lateral chest views in 8-10 weeks to ensure resolution. Narrative:  EXAM:  XR CHEST PORT INDICATION:  Shortness of breath and fever today. Ground-level fall this  
afternoon. Renal cell carcinoma and prostate carcinoma. COMPARISON: Chest views on 10/31/2018. TECHNIQUE: Upright portable chest AP view FINDINGS: Left port and catheter are unchanged. Sternotomy wires and abdominal  
surgical clips are unchanged. Cardiomegaly is unchanged. Aortic arch is  
atherosclerotic but not enlarged. The pulmonary vasculature is within normal  
limits. Heterogeneous ill-defined left mid and lung base opacities are new versus more  
conspicuous. Right lung is clear. No pneumothorax. Bones are unchanged. Medical Decision Making I am the first provider for this patient. I reviewed the vital signs, available nursing notes, past medical history, past surgical history, family history and social history. Vital Signs-Reviewed the patient's vital signs. Patient Vitals for the past 12 hrs: 
 Temp Pulse Resp BP SpO2  
11/03/18 2224    143/64   
11/03/18 2139 (!) 102.6 °F (39.2 °C) 83 22 130/67 95 % Pulse Oximetry Analysis - 95% on RA Records Reviewed: Nursing Notes, Old Medical Records and Previous Laboratory Studies Provider Notes (Medical Decision Making):  
Febrile male currently receiving maintenance chemotherapy for melanoma and recently in ED 4 days ago for dyspnea on exertion. Now currently with diffuse weakness and fever but with normal vital signs and normal exam. Low suspicion intraabdominal process, meningitis. ED Course:  
Initial assessment performed. The patients presenting problems have been discussed, and they are in agreement with the care plan formulated and outlined with them. I have encouraged them to ask questions as they arise throughout their visit. 11:00 Patient continues to do well. Discussed results with he and his family members.  Given normal labs and continued normal vitals as well as normal saturation, will d/c with antibiotics and recheck recommendation with PCP this week. Critical Care Time: 0 Disposition: 
Discharge Note: 
11:27 PM 
The pt is ready for discharge. The pt's signs, symptoms, diagnosis, and discharge instructions have been discussed and pt has conveyed their understanding. The pt is to follow up as recommended or return to ER should their symptoms worsen. Plan has been discussed and pt is in agreement. PLAN: 
1. Current Discharge Medication List  
  
 
2. Follow-up Information Follow up With Specialties Details Why Contact Info Kristopher Antony MD Internal Medicine Call for recheck this week [address] [facility] [place] [facility] [address]. 
[phone] [facility] EMERGENCY DEPT Emergency Medicine  If symptoms worsen [address] [facility] 
[phone] Return to ED if worse Diagnosis Clinical Impression: 1. Pneumonia of left lower lobe due to infectious organism [facility]) Attestations: This note is prepared by Reina Szymanski, acting as a Scribe for Thomas Coates MD. Thomas Coates MD: The scribe's documentation has been prepared under my direction and personally reviewed by me in its entirety. I confirm that the notes above accurately reflects all work, treatment, procedures, and medical decision making performed by me. This note will not be viewable in [address]

## 2018-11-04 NOTE — PROGRESS NOTES
Pharmacy Automatic Renal Dosing Protocol - Antimicrobials Indication for Antimicrobials: CAP Current Regimen of Each Antimicrobial: 
Levofloxacin 750 mg IV every 24hr (Start Date 11/3; Day # 2) Previous Antimicrobial Therapy: 
 
Significant Cultures:  
 
Radiology / Imaging results: (X-ray, CT scan or MRI):  
 
Paralysis, amputations, malnutrition: n/a Labs: 
Recent Labs 18 
1220 18 
2201 CREA 1.30 1.50* BUN 23* 30* WBC 11.7* 10.0 Temp (24hrs), Av.2 °F (37.9 °C), Min:98.9 °F (37.2 °C), Max:102.6 °F (39.2 °C) Creatinine Clearance (mL/min) or Dialysis: 39.6 ml/min Impression/Plan:  
· Change to levofloxacin 750 mg IV every 48 hr per renal dosing policy · BMP tomorrow am 
· Antimicrobial stop date: 7 days for levofloxacin Pharmacy will follow daily and adjust medications as appropriate for renal function and/or serum levels. Thank you, Elpidio Baer, PETERD

## 2018-11-04 NOTE — ED NOTES
Venous access device heparinized and discontinued and bandage applied. t tolerated well. Pt discharged home with written and verbal instructions given by Dr. Cody Martinez. Pt to waiting room/car via wheelchair.

## 2018-11-04 NOTE — DISCHARGE INSTRUCTIONS
Pneumonia: Care Instructions  Your Care Instructions  Pneumonia is an infection of the lungs. Most cases are caused by infections from bacteria or viruses. Pneumonia may be mild or very severe. If it is caused by bacteria, you will be treated with antibiotics. It may take a few weeks to a few months to recover fully from pneumonia, depending on how sick you were and whether your overall health is good. Follow-up care is a key part of your treatment and safety. Be sure to make and go to all appointments, and call your doctor if you are having problems. It's also a good idea to know your test results and keep a list of the medicines you take. How can you care for yourself at home? · Take your antibiotics exactly as directed. Do not stop taking the medicine just because you are feeling better. You need to take the full course of antibiotics. · Take your medicines exactly as prescribed. Call your doctor if you think you are having a problem with your medicine. · Get plenty of rest and sleep. You may feel weak and tired for a while, but your energy level will improve with time. · To prevent dehydration, drink plenty of fluids, enough so that your urine is light yellow or clear like water. Choose water and other caffeine-free clear liquids until you feel better. If you have kidney, heart, or liver disease and have to limit fluids, talk with your doctor before you increase the amount of fluids you drink. · Take care of your cough so you can rest. A cough that brings up mucus from your lungs is common with pneumonia. It is one way your body gets rid of the infection. But if coughing keeps you from resting or causes severe fatigue and chest-wall pain, talk to your doctor. He or she may suggest that you take a medicine to reduce the cough. · Use a vaporizer or humidifier to add moisture to your bedroom. Follow the directions for cleaning the machine. · Do not smoke or allow others to smoke around you.  Smoke will make your cough last longer. If you need help quitting, talk to your doctor about stop-smoking programs and medicines. These can increase your chances of quitting for good. · Take an over-the-counter pain medicine, such as acetaminophen (Tylenol), ibuprofen (Advil, Motrin), or naproxen (Aleve). Read and follow all instructions on the label. · Do not take two or more pain medicines at the same time unless the doctor told you to. Many pain medicines have acetaminophen, which is Tylenol. Too much acetaminophen (Tylenol) can be harmful. · If you were given a spirometer to measure how well your lungs are working, use it as instructed. This can help your doctor tell how your recovery is going. · To prevent pneumonia in the future, talk to your doctor about getting a flu vaccine (once a year) and a pneumococcal vaccine (one time only for most people). When should you call for help? Call 911 anytime you think you may need emergency care. For example, call if:    · You have severe trouble breathing.    Call your doctor now or seek immediate medical care if:    · You cough up dark brown or bloody mucus (sputum).     · You have new or worse trouble breathing.     · You are dizzy or lightheaded, or you feel like you may faint.    Watch closely for changes in your health, and be sure to contact your doctor if:    · You have a new or higher fever.     · You are coughing more deeply or more often.     · You are not getting better after 2 days (48 hours).     · You do not get better as expected. Where can you learn more? Go to http://brock-pauline.info/. Enter [id number] in the search box to learn more about \"Pneumonia: Care Instructions. \"  Current as of: December 6, 2017  Content Version: 11.8  © 3848-5062 [employer]. Care instructions adapted under license by [employer] (which disclaims liability or warranty for this information).  If you have questions about a medical condition or this instruction, always ask your healthcare professional. [place] [address] any warranty or liability for your use of this information.

## 2018-11-04 NOTE — ED NOTES
Care of patient assumed from triage. Patient presents with complaints of shortness of breath, fever according to family, and a GLF. Code purple called in triage.

## 2018-11-04 NOTE — ROUTINE PROCESS
TRANSFER - OUT REPORT: 
 
Verbal report given to [address] [place] [address] ESTEE Smith and Bright Arciniega R.N. (name) on Alycia Ladd  being transferred to PCU (unit) for routine progression of care Report consisted of patients Situation, Background, Assessment and  
Recommendations(SBAR). Information from the following report(s) SBAR, ED Summary, [facility] and Recent Results was reviewed with the receiving nurse. Lines:  
Venous Access Device 07/30/16 Upper chest (subclavicular area), left (Active) Venous Access Device power port venous access device 11/04/18 Upper chest (subclavicular area), left (Active) Opportunity for questions and clarification was provided. Patient transported with: 
 Monitor Registered Nurse

## 2018-11-04 NOTE — PROGRESS NOTES
1824- Pt appears to have went into Afib with HR irregular 110's-130's. Pt asymptomatic. EKG ordered. Pt states he takes 100 mg amiodarone daily but is unsure if he received his dose today. On call hospitalist paged. 006 [facility] with Dr. Ro Shea re: afib and hypotension. Orders received for 500ml NS bolus and she will place orders for Amio bolus and gtt. See MAR.

## 2018-11-04 NOTE — ED PROVIDER NOTES
Patient Name: Leroy Mckee History of Presenting Illness Chief Complaint Patient presents with  Shortness of Breath  
  diagnosed with pneumonia at this ED just left this ED at 2am. returns for shortness of breath and weakness by rescue History Provided By: Patient and Patient's Wife HPI: Leroy Mckee, 80 y.o. male with PMHx significant for prostate cancer, right kidney cancer, atrial flutter, aneurysm, DM, hyperglycemia, returns to the ED via EMS with cc of worsening of SOB and new onset nausea/vomiting beginning this morning with associated sx of worsening generalized weakness & fatigue, decreased appetite, and fever beginning yesterday. Pt's wife notes one single episode of vomiting around 05:30AM this morning. Pt was evaluated in ED yesterday (3 November 2018) and was dx'd with pneumonia and discharged on abx. Pt was also here recently on 31 Oct 2018 for cc of SOB by me, at which time he had a negative workup including a negative CTA. Pt took abx medication this morning, but has not taken other morning medications due to nausea. Patient states he began to feel more nauseous and was so weak he was unable to get out of bed, prompting return to the emergency department. Pt specifically denies cough, chest pain, abdominal pain, diarrhea, and dysuria. Social Hx: - (former) Tobacco, - EtOH, - Illicit Drugs There are no other complaints, changes, or physical findings at this time. PCP: Feliz Anna MD 
 
Current Outpatient Medications Medication Sig Dispense Refill  levoFLOXacin (LEVAQUIN) 750 mg tablet Take 1 Tab by mouth daily. 6 Tab 0  
 nateglinide (STARLIX) 60 mg tablet TAKE 1 TABLET BY MOUTH 3 TIMES A DAY WITH MEALS 90 Tab 3  furosemide (LASIX) 20 mg tablet TAKE 1 TABLET BY MOUTH EVERY OTHER DAY OR AS DIRECTED  2  
 hydrocortisone (CORTEF) 10 mg tablet TAKE 1 TABLET BY MOUTH TWICE A DAY  10  
  latanoprost (XALATAN) 0.005 % ophthalmic solution INSTILL 1 DROP BY OPHTHALMIC ROUTE EVERY DAY INTO BOTH EYES AT BEDTIME  5  
 predniSONE (DELTASONE) 10 mg tablet 6 tabs day 1, 5 tabs day 2, 4 tabs day 3, 3 tabs day 4, 2 tabs day 5, 1 tab day 6 21 Tab 0  
 PREVIDENT 5000 BOOSTER PLUS 1.1 % pste BRUSH AFTER BREAKFAST AND SUPPER CAN ALSO USE IN FLUORIDE TRAY  12  
 diclofenac (VOLTAREN) 1 % gel Apply 2 g to affected area four (4) times daily as needed. 100 g 0  
 hydrocortisone (CORTEF) 20 mg tablet Take 1 Tab by mouth two (2) times daily (with meals). 60 Tab 0  
 spironolactone (ALDACTONE) 50 mg tablet Take 1 Tab by mouth daily. 30 Tab 0  
 bimatoprost (LUMIGAN) 0.01 % ophthalmic drops Administer 1 Drop to both eyes nightly.  cholecalciferol (VITAMIN D3) 1,000 unit tablet Take 1,000 Units by mouth daily.  apixaban (ELIQUIS) 5 mg tablet Take 5 mg by mouth two (2) times a day.  acetaminophen (TYLENOL EXTRA STRENGTH) 500 mg tablet Take 500 mg by mouth every six (6) hours as needed for Pain.  amiodarone (CORDARONE) 200 mg tablet Take 100 mg by mouth daily.  lidocaine (LIDODERM) 5 %(700 mg/patch) 1 Patch by TransDERmal route every twelve (12) hours as needed. Past History Past Medical History: 
Past Medical History:  
Diagnosis Date  Aneurysm ([facility] [address] 10/1/14 BRAIN RECENT DX , followed by dr. hodges, radiologist  
 Atrial flutter [facility])   
 s/p cardioversion  Cancer [facility]) 2013 LEFT FOOT ,MELANOMA  Cancer ([facility] [address] 1980 RIGHT KIDNEY  
 Cancer [facility]) 1988 PROSTATE  TREATED WITH RADIATION  Chronic pain BACK AND RIGHT HIP  
 Diabetes mellitus, stable ([address] 7/13/2017 Steroid induced  Hyperglycemia 7/13/2017 Steroid induced: follow up A1C, BMP, discontinue Starlix in wnl  LBP (low back pain)  Left hip pain 7/13/2017  Xray shows no obvious Fx or Metastatic lesions, this pain could be arthritic in nature or radicular, will give a corticosteroid injection, if no benefit may need Ortho eval  
 Malignant neoplasm of prostate ([facility] [address] 2017  OA (osteoarthritis)  Prostate cancer ([facility] [address]  Pure hyperglyceridemia 2017  Renal cell cancer (right)  Small bowel obstruction ([facility] [address] 2017 Past Surgical History: 
Past Surgical History:  
Procedure Laterality Date [address] MITRAL VALVE REPAIR  
 [address]  HX CATARACT REMOVAL Bilateral   HX LUMBAR LAMINECTOMY    
 HX ORTHOPAEDIC    
 CARPAL TUNNEL-SERAFIN  HX OTHER SURGICAL  OCT 2014 MELANOMA REMOVED FROM HEEL  
 [address] TURP  
 HX UROLOGICAL Right NEPHRECTOMY  [place] COLSC FLX W/REMOVAL LESION BY HOT BX FORCEPS  2012  [place] SIGMOIDOSCOPY FLX DX W/COLLJ SPEC BR/WA IF PFRMD  2012 Family History: 
Family History Problem Relation Age of Onset  Diabetes Father  Dementia Mother  Cancer Sister BRAIN  
 Heart Disease Brother  Arthritis-osteo Sister  Anesth Problems Neg Hx Social History: 
Social History Tobacco Use  Smoking status: Former Smoker Packs/day: 0.25 Years: 2.00 Pack years: 0.50 Last attempt to quit: 1953 Years since quittin.5  Smokeless tobacco: Never Used Substance Use Topics  Alcohol use: No  
 Drug use: No  
 
 
Allergies: Allergies Allergen Reactions  Relafen [Nabumetone] Itching Review of Systems Review of Systems Constitutional: Positive for appetite change (decreased), fatigue and fever. Negative for chills. HENT: Negative for congestion, rhinorrhea and sore throat. Respiratory: Positive for shortness of breath. Negative for cough. Cardiovascular: Negative for chest pain. Gastrointestinal: Positive for nausea. Negative for abdominal pain and vomiting. Genitourinary: Negative for dysuria and urgency. Skin: Negative for rash. Neurological: Positive for weakness (generalized). Negative for dizziness, light-headedness and headaches. All other systems reviewed and are negative. Physical Exam  
Physical Exam  
Constitutional: He is oriented to person, place, and time. He appears well-developed and well-nourished. He appears lethargic. He appears ill. No distress. HENT:  
Head: Normocephalic and atraumatic. Eyes: Conjunctivae and EOM are normal. Pupils are equal, round, and reactive to light. Neck: Normal range of motion. Cardiovascular: Normal rate, regular rhythm and intact distal pulses. Pulmonary/Chest: Effort normal. No stridor. No respiratory distress. He has rales (left lung base). Port in left chest  
Abdominal: Soft. He exhibits no distension. There is no tenderness. Musculoskeletal: Normal range of motion. Neurological: He is oriented to person, place, and time. He appears lethargic. Arouses easily Skin: Skin is warm and dry. Ecchymosis (right hand) noted. Psychiatric: He has a normal mood and affect. Nursing note and vitals reviewed. Diagnostic Study Results Labs - Recent Results (from the past 12 hour(s)) POC LACTIC ACID Collection Time: 11/04/18 12:06 PM  
Result Value Ref Range Lactic Acid (POC) 1.18 0.40 - 2.00 mmol/L  
CBC WITH AUTOMATED DIFF Collection Time: 11/04/18 12:20 PM  
Result Value Ref Range WBC 11.7 (H) 4.1 - 11.1 K/uL  
 RBC 4.42 4.10 - 5.70 M/uL  
 HGB 13.2 12.1 - 17.0 g/dL HCT 40.0 36.6 - 50.3 % MCV 90.5 80.0 - 99.0 FL  
 MCH 29.9 26.0 - 34.0 PG  
 MCHC 33.0 30.0 - 36.5 g/dL  
 RDW 13.8 11.5 - 14.5 % PLATELET 547 (L) 870 - 400 K/uL MPV 11.0 8.9 - 12.9 FL  
 NRBC 0.2 (H) 0  WBC ABSOLUTE NRBC 0.02 (H) 0.00 - 0.01 K/uL NEUTROPHILS 90 (H) 32 - 75 % LYMPHOCYTES 5 (L) 12 - 49 % MONOCYTES 3 (L) 5 - 13 % EOSINOPHILS 1 0 - 7 % BASOPHILS 0 0 - 1 % IMMATURE GRANULOCYTES 1 (H) 0.0 - 0.5 % ABS. NEUTROPHILS 10.5 (H) 1.8 - 8.0 K/UL  
 ABS. LYMPHOCYTES 0.6 (L) 0.8 - 3.5 K/UL  
 ABS. MONOCYTES 0.4 0.0 - 1.0 K/UL  
 ABS. EOSINOPHILS 0.1 0.0 - 0.4 K/UL  
 ABS. BASOPHILS 0.0 0.0 - 0.1 K/UL  
 ABS. IMM. GRANS. 0.1 (H) 0.00 - 0.04 K/UL  
 DF SMEAR SCANNED    
 RBC COMMENTS NORMOCYTIC, NORMOCHROMIC METABOLIC PANEL, COMPREHENSIVE Collection Time: 11/04/18 12:20 PM  
Result Value Ref Range Sodium 129 (L) 136 - 145 mmol/L Potassium 4.6 3.5 - 5.1 mmol/L Chloride 97 97 - 108 mmol/L  
 CO2 26 21 - 32 mmol/L Anion gap 6 5 - 15 mmol/L Glucose 148 (H) 65 - 100 mg/dL BUN 23 (H) 6 - 20 MG/DL Creatinine 1.30 0.70 - 1.30 MG/DL  
 BUN/Creatinine ratio 18 12 - 20 GFR est AA >60 >60 ml/min/1.73m2 GFR est non-AA 52 (L) >60 ml/min/1.73m2 Calcium 7.8 (L) 8.5 - 10.1 MG/DL Bilirubin, total 1.1 (H) 0.2 - 1.0 MG/DL  
 ALT (SGPT) 15 12 - 78 U/L  
 AST (SGOT) 15 15 - 37 U/L Alk. phosphatase 54 45 - 117 U/L Protein, total 6.0 (L) 6.4 - 8.2 g/dL Albumin 2.6 (L) 3.5 - 5.0 g/dL Globulin 3.4 2.0 - 4.0 g/dL A-G Ratio 0.8 (L) 1.1 - 2.2 GLUCOSE, POC Collection Time: 11/04/18  4:17 PM  
Result Value Ref Range Glucose (POC) 241 (H) 65 - 100 mg/dL Performed by Rk Tao Medical Decision Making I am the first provider for this patient. I reviewed the vital signs, available nursing notes, past medical history, past surgical history, family history and social history. Vital Signs-Reviewed the patient's vital signs. Patient Vitals for the past 12 hrs: 
 Temp Pulse Resp BP SpO2  
11/04/18 1100 100.2 °F (37.9 °C) 74 18 113/45 94 % Pulse Oximetry Analysis - 94% on RA Cardiac Monitor:  
Rate: 74 bpm 
Rhythm: Normal Sinus Rhythm Records Reviewed: Nursing Notes, Old Medical Records, Ambulance Run Sheet, Previous Radiology Studies and Previous Laboratory Studies Provider Notes (Medical Decision Making):  
 Patient re-presents to ED for known pneumonia after being discharged 7 hours prior. He is ill appearing on exam. Will give IVF. Patient already took oral abx this AM and got an IV dose last night so will not re-dose at this time. Flu swab done last night was negative. Blood cultures also sent last night. Will discuss with hospitalist for admission. ED Course:  
Initial assessment performed. The patients presenting problems have been discussed, and they are in agreement with the care plan formulated and outlined with them. I have encouraged them to ask questions as they arise throughout their visit. Consult Note: 
12:09 PM 
Angus Campos MD spoke with Creighton Aschoff, MD, Specialty: hospitalist 
Discussed pt's hx, disposition, and available diagnostic and imaging results. Reviewed care plans. Consultant agrees with plans as outlined. Dr. Barak Villegas will admit. Requests repeat labs, which have been sent Critical Care Time: 0 minutes Disposition: 
Admit Note: 
12:09 PM 
Pt is being admitted by Creighton Aschoff, MD. The results of their tests and reason(s) for their admission have been discussed with pt and/or available family. They convey agreement and understanding for the need to be admitted and for admission diagnosis. PLAN: 
1. Admit to Hospital 
 
Diagnosis Clinical Impression: 1. Pneumonia of left lower lobe due to infectious organism [facility]) Attestations: This note is prepared by Leobardo Monroe, acting as Scribe for Angus Campos MD. 
 
The scribe's documentation has been prepared under my direction and personally reviewed by me in its entirety. I confirm that the note above accurately reflects all work, treatment, procedures, and medical decision making performed by me. Angus Ballesteros.  Cahrlotte Manzanares MD

## 2018-11-05 LAB
ANION GAP SERPL CALC-SCNC: 7 MMOL/L (ref 5–15)
ATRIAL RATE: 122 BPM
BASOPHILS # BLD: 0 K/UL (ref 0–0.1)
BASOPHILS NFR BLD: 0 % (ref 0–1)
BUN SERPL-MCNC: 19 MG/DL (ref 6–20)
BUN/CREAT SERPL: 15 (ref 12–20)
CALCIUM SERPL-MCNC: 7.9 MG/DL (ref 8.5–10.1)
CALCULATED R AXIS, ECG10: -72 DEGREES
CALCULATED T AXIS, ECG11: 9 DEGREES
CHLORIDE SERPL-SCNC: 107 MMOL/L (ref 97–108)
CO2 SERPL-SCNC: 22 MMOL/L (ref 21–32)
CREAT SERPL-MCNC: 1.23 MG/DL (ref 0.7–1.3)
DIAGNOSIS, 93000: NORMAL
DIFFERENTIAL METHOD BLD: ABNORMAL
EOSINOPHIL # BLD: 0 K/UL (ref 0–0.4)
EOSINOPHIL NFR BLD: 0 % (ref 0–7)
ERYTHROCYTE [DISTWIDTH] IN BLOOD BY AUTOMATED COUNT: 13.9 % (ref 11.5–14.5)
GLUCOSE BLD STRIP.AUTO-MCNC: 167 MG/DL (ref 65–100)
GLUCOSE BLD STRIP.AUTO-MCNC: 239 MG/DL (ref 65–100)
GLUCOSE BLD STRIP.AUTO-MCNC: 275 MG/DL (ref 65–100)
GLUCOSE BLD STRIP.AUTO-MCNC: 292 MG/DL (ref 65–100)
GLUCOSE SERPL-MCNC: 228 MG/DL (ref 65–100)
HCT VFR BLD AUTO: 37.3 % (ref 36.6–50.3)
HGB BLD-MCNC: 12.2 G/DL (ref 12.1–17)
IMM GRANULOCYTES # BLD: 0.1 K/UL (ref 0–0.04)
IMM GRANULOCYTES NFR BLD AUTO: 1 % (ref 0–0.5)
LYMPHOCYTES # BLD: 0.2 K/UL (ref 0.8–3.5)
LYMPHOCYTES NFR BLD: 2 % (ref 12–49)
MAGNESIUM SERPL-MCNC: 1.9 MG/DL (ref 1.6–2.4)
MCH RBC QN AUTO: 30.2 PG (ref 26–34)
MCHC RBC AUTO-ENTMCNC: 32.7 G/DL (ref 30–36.5)
MCV RBC AUTO: 92.3 FL (ref 80–99)
MONOCYTES # BLD: 0.2 K/UL (ref 0–1)
MONOCYTES NFR BLD: 3 % (ref 5–13)
NEUTS SEG # BLD: 8.8 K/UL (ref 1.8–8)
NEUTS SEG NFR BLD: 94 % (ref 32–75)
NRBC # BLD: 0 K/UL (ref 0–0.01)
NRBC BLD-RTO: 0 PER 100 WBC
PHOSPHATE SERPL-MCNC: 2.8 MG/DL (ref 2.6–4.7)
PLATELET # BLD AUTO: 120 K/UL (ref 150–400)
PMV BLD AUTO: 11.3 FL (ref 8.9–12.9)
POTASSIUM SERPL-SCNC: 4.5 MMOL/L (ref 3.5–5.1)
Q-T INTERVAL, ECG07: 404 MS
QRS DURATION, ECG06: 158 MS
QTC CALCULATION (BEZET), ECG08: 568 MS
RBC # BLD AUTO: 4.04 M/UL (ref 4.1–5.7)
SERVICE CMNT-IMP: ABNORMAL
SODIUM SERPL-SCNC: 136 MMOL/L (ref 136–145)
VENTRICULAR RATE, ECG03: 119 BPM
WBC # BLD AUTO: 9.4 K/UL (ref 4.1–11.1)

## 2018-11-05 PROCEDURE — G8988 SELF CARE GOAL STATUS: HCPCS | Performed by: OCCUPATIONAL THERAPIST

## 2018-11-05 PROCEDURE — 74011250636 HC RX REV CODE- 250/636: Performed by: HOSPITALIST

## 2018-11-05 PROCEDURE — 93306 TTE W/DOPPLER COMPLETE: CPT

## 2018-11-05 PROCEDURE — 80048 BASIC METABOLIC PNL TOTAL CA: CPT | Performed by: HOSPITALIST

## 2018-11-05 PROCEDURE — G8987 SELF CARE CURRENT STATUS: HCPCS | Performed by: OCCUPATIONAL THERAPIST

## 2018-11-05 PROCEDURE — 97116 GAIT TRAINING THERAPY: CPT

## 2018-11-05 PROCEDURE — 97165 OT EVAL LOW COMPLEX 30 MIN: CPT | Performed by: OCCUPATIONAL THERAPIST

## 2018-11-05 PROCEDURE — G8978 MOBILITY CURRENT STATUS: HCPCS

## 2018-11-05 PROCEDURE — 74011250637 HC RX REV CODE- 250/637: Performed by: HOSPITALIST

## 2018-11-05 PROCEDURE — 83735 ASSAY OF MAGNESIUM: CPT | Performed by: HOSPITALIST

## 2018-11-05 PROCEDURE — 74011000258 HC RX REV CODE- 258: Performed by: HOSPITALIST

## 2018-11-05 PROCEDURE — 82962 GLUCOSE BLOOD TEST: CPT

## 2018-11-05 PROCEDURE — 84100 ASSAY OF PHOSPHORUS: CPT | Performed by: HOSPITALIST

## 2018-11-05 PROCEDURE — G8989 SELF CARE D/C STATUS: HCPCS | Performed by: OCCUPATIONAL THERAPIST

## 2018-11-05 PROCEDURE — 97161 PT EVAL LOW COMPLEX 20 MIN: CPT

## 2018-11-05 PROCEDURE — 85025 COMPLETE CBC W/AUTO DIFF WBC: CPT | Performed by: HOSPITALIST

## 2018-11-05 PROCEDURE — 74011636637 HC RX REV CODE- 636/637: Performed by: HOSPITALIST

## 2018-11-05 PROCEDURE — 65660000000 HC RM CCU STEPDOWN

## 2018-11-05 PROCEDURE — 77030018836 HC SOL IRR NACL ICUM -A

## 2018-11-05 PROCEDURE — 51798 US URINE CAPACITY MEASURE: CPT

## 2018-11-05 PROCEDURE — 74011250637 HC RX REV CODE- 250/637: Performed by: INTERNAL MEDICINE

## 2018-11-05 PROCEDURE — 36415 COLL VENOUS BLD VENIPUNCTURE: CPT | Performed by: HOSPITALIST

## 2018-11-05 PROCEDURE — G8979 MOBILITY GOAL STATUS: HCPCS

## 2018-11-05 RX ORDER — AMIODARONE HYDROCHLORIDE 200 MG/1
200 TABLET ORAL 2 TIMES DAILY
Status: DISCONTINUED | OUTPATIENT
Start: 2018-11-05 | End: 2018-11-06

## 2018-11-05 RX ADMIN — LEVOFLOXACIN 750 MG: 5 INJECTION, SOLUTION INTRAVENOUS at 23:35

## 2018-11-05 RX ADMIN — INSULIN LISPRO 3 UNITS: 100 INJECTION, SOLUTION INTRAVENOUS; SUBCUTANEOUS at 12:06

## 2018-11-05 RX ADMIN — CEFEPIME HYDROCHLORIDE 2 G: 2 INJECTION, POWDER, FOR SOLUTION INTRAVENOUS at 17:52

## 2018-11-05 RX ADMIN — AMIODARONE HYDROCHLORIDE 200 MG: 200 TABLET ORAL at 19:14

## 2018-11-05 RX ADMIN — APIXABAN 5 MG: 5 TABLET, FILM COATED ORAL at 08:21

## 2018-11-05 RX ADMIN — Medication 10 ML: at 14:40

## 2018-11-05 RX ADMIN — HYDROCORTISONE SODIUM SUCCINATE 50 MG: 100 INJECTION, POWDER, FOR SOLUTION INTRAMUSCULAR; INTRAVENOUS at 21:21

## 2018-11-05 RX ADMIN — SODIUM CHLORIDE 130 ML/HR: 900 INJECTION, SOLUTION INTRAVENOUS at 04:01

## 2018-11-05 RX ADMIN — AMIODARONE HYDROCHLORIDE 0.5 MG/MIN: 50 INJECTION, SOLUTION INTRAVENOUS at 02:32

## 2018-11-05 RX ADMIN — APIXABAN 5 MG: 5 TABLET, FILM COATED ORAL at 17:53

## 2018-11-05 RX ADMIN — AMIODARONE HYDROCHLORIDE 0.5 MG/MIN: 50 INJECTION, SOLUTION INTRAVENOUS at 17:05

## 2018-11-05 RX ADMIN — INSULIN LISPRO 3 UNITS: 100 INJECTION, SOLUTION INTRAVENOUS; SUBCUTANEOUS at 21:20

## 2018-11-05 RX ADMIN — NATEGLINIDE 60 MG: 120 TABLET, FILM COATED ORAL at 17:53

## 2018-11-05 RX ADMIN — NATEGLINIDE 60 MG: 120 TABLET, FILM COATED ORAL at 08:21

## 2018-11-05 RX ADMIN — INSULIN LISPRO 2 UNITS: 100 INJECTION, SOLUTION INTRAVENOUS; SUBCUTANEOUS at 17:54

## 2018-11-05 RX ADMIN — HYDROCORTISONE SODIUM SUCCINATE 50 MG: 100 INJECTION, POWDER, FOR SOLUTION INTRAMUSCULAR; INTRAVENOUS at 14:40

## 2018-11-05 RX ADMIN — CEFEPIME HYDROCHLORIDE 2 G: 2 INJECTION, POWDER, FOR SOLUTION INTRAVENOUS at 05:50

## 2018-11-05 RX ADMIN — LATANOPROST 1 DROP: 50 SOLUTION OPHTHALMIC at 21:14

## 2018-11-05 RX ADMIN — NATEGLINIDE 60 MG: 120 TABLET, FILM COATED ORAL at 12:06

## 2018-11-05 RX ADMIN — VITAMIN D, TAB 1000IU (100/BT) 1000 UNITS: 25 TAB at 08:21

## 2018-11-05 RX ADMIN — HYDROCORTISONE SODIUM SUCCINATE 50 MG: 100 INJECTION, POWDER, FOR SOLUTION INTRAMUSCULAR; INTRAVENOUS at 05:50

## 2018-11-05 RX ADMIN — Medication 10 ML: at 21:15

## 2018-11-05 RX ADMIN — Medication 10 ML: at 05:50

## 2018-11-05 NOTE — CONSULTS
Consult NAME: Jerrell Kirkland :  1931 MRN:  [id number] Date/Time:  2018 3:29 PM 
 
Patient PCP: Junito Morrison MD 
________________________________________________________________________ Assessment: 1. Pneumonia with Prox Afib 2. No hx of CAD 3. Severe MR s/p repair in  4. Prox afib s/p CV in , RBBB 5. Metastatic Melanoma Dr. Maria Eugenia Molina 6. Back Pain 7. Cerebral aneurysm 8. Cardiologist:  Tia Sifuentes Plan:  
 
Pneumonia on abx Prox afib back in sinus on amiodarone 1. Cont eliquis 2. Change amiodarone back to PO will give 200mg bid while in hospital, would discharge back on 100mg daily 3. Holding furosemide, resume on discharge 
 
  
 
 [x]        High complexity decision making was performed Subjective: CHIEF COMPLAINT: Prox Afib HISTORY OF PRESENT ILLNESS:    
 
Jerrell Kirkland is a 80 y.o.  male with metastatic melanoma currently on chemotherapy with opdivo which he last received on . Patient seen in ER 10/31 for JARQUIN. CTA chest negative for PE or PNA. Tara Cannon was 1900 and was told to f/u cardiology for echo. Seen in ER again last night for SOB, fever 102, generalized weakness, s/p fall. CXR showed left mid and lower lung PNA. and discharged on levaquin. He was very weak, required 2 person assist to walk. Woke up at 5:30am today and vomited x 1, was too sit up in bed. In ER O2 sat 88% RA. 
  
Denies chest pain, abdominal pain, diarrhea, bleeding.  + mild cough, nonproductive. + Difficulty urinating due to hydrocoele. No chest pain, Breathing improved, no edema, no palpitations, no syncope. We were asked to consult for work up and evaluation of the above problems. Past Medical History:  
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 hodges, radiologist  
 [facility]utter [facility])   
 s/p cardioversion  Cancer [facility])  LEFT FOOT ,MELANOMA  Cancer ([facility] [address] 1980 RIGHT KIDNEY  
 Cancer [facility]) 1988 PROSTATE  TREATED WITH RADIATION  Chronic pain BACK AND RIGHT HIP  
 Diabetes mellitus, stable ([address] 2017 Steroid induced  Hyperglycemia 2017 Steroid induced: follow up A1C, BMP, discontinue Starlix in wnl  LBP (low back pain)  Left hip pain 2017 Xray shows no obvious Fx or Metastatic lesions, this pain could be arthritic in nature or radicular, will give a corticosteroid injection, if no benefit may need Ortho eval  
 Malignant neoplasm of prostate ([address] 2017  OA (osteoarthritis)  Prostate cancer ([address]  Pure hyperglyceridemia 2017  Renal cell cancer (right)  Small bowel obstruction ([address] 2017 Past Surgical History:  
Procedure Laterality Date [facility] [address]35 MITRAL VALVE REPAIR  
 [address]  HX CATARACT REMOVAL Bilateral   HX LUMBAR LAMINECTOMY    
 HX ORTHOPAEDIC    
 CARPAL TUNNEL-SERAFIN  HX OTHER SURGICAL  OCT 2014 MELANOMA REMOVED FROM HEEL  
 [facility] TURP  
 HX UROLOGICAL Right NEPHRECTOMY  [place] COLSC FLX W/REMOVAL LESION BY HOT BX FORCEPS  2012  [place] SIGMOIDOSCOPY FLX DX W/COLLJ SPEC BR/WA IF PFRMD  2012 Allergies Allergen Reactions  Relafen [Nabumetone] Itching Meds:  See below Social History Tobacco Use  Smoking status: Former Smoker Packs/day: 0.25 Years: 2.00 Pack years: 0.50 Last attempt to quit: 1953 Years since quittin.5  Smokeless tobacco: Never Used Substance Use Topics  Alcohol use: No  
  
Family History Problem Relation Age of Onset  Diabetes Father  Dementia Mother  Cancer Sister BRAIN  
 Heart Disease Brother  Arthritis-osteo Sister  Anesth Problems Neg Hx REVIEW OF SYSTEMS:   
 []         Unable to obtain  ROS due to --- 
 [x]         Total of 12 systems reviewed as follows: Total of 12 systems reviewed as follows:   
   POSITIVE= Bold text  Negative = normal text General:  fever, chills, sweats, generalized weakness, weight loss/gain,  
   loss of appetite Eyes:    blurred vision, eye pain, loss of vision, double vision ENT:    rhinorrhea, pharyngitis Respiratory:   cough, sputum production, SOB, JARQUIN, wheezing, pleuritic pain  
Cardiology:   chest pain, palpitations, orthopnea, PND, edema, syncope Gastrointestinal:  abdominal pain , N/V, diarrhea, dysphagia, constipation, bleeding Genitourinary:  frequency, urgency, dysuria, hematuria, incontinence Muskuloskeletal :  arthralgia, myalgia, back pain Hematology:  easy bruising, nose or gum bleeding, lymphadenopathy Dermatological: rash, ulceration, pruritis, color change / jaundice Endocrine:   hot flashes or polydipsia Neurological:  headache, dizziness, confusion, focal weakness, paresthesia, Speech difficulties, memory loss, gait difficulty Psychological: Feelings of anxiety, depression, agitation Objective:  
  
Physical Exam: 
 
Last 24hrs VS reviewed since prior progress note. Most recent are: 
 
Visit Vitals /66 (BP 1 Location: Left arm, BP Patient Position: At rest) Pulse 67 Temp 96.7 °F (35.9 °C) Resp 18 Ht 5' 8\" (1.727 m) Wt 81.7 kg (180 lb 1.6 oz) SpO2 95% BMI 27.38 kg/m² Intake/Output Summary (Last 24 hours) at 11/5/2018 1529 Last data filed at 11/5/2018 1450 Gross per 24 hour Intake 2190.01 ml Output 1900 ml Net 290.01 ml General Appearance: Well developed, elderly, alert & oriented x 3,  
 no acute distress. Ears/Nose/Mouth/Throat: Pupils equal and round, Hearing grossly normal. 
Neck: Supple. JVP within normal limits. Carotids good upstrokes, with no bruit. Chest: Lungs clear to auscultation bilaterally. Cardiovascular: Regular rate and rhythm, S1S2 normal, no murmur, rubs, gallops. Abdomen: Soft, non-tender, bowel sounds are active. No organomegaly. Extremities: No edema bilaterally. Femoral pulses +2, Distal Pulses +1. Skin: Warm and dry. Neuro: CN II-XII grossly intact, Strength and sensation grossly intact. Data:  
  
Prior to Admission medications Medication Sig Start Date End Date Taking? Authorizing Provider  
calcium carbonate (CALCIUM 600) 600 mg calcium (1,500 mg) tablet Take 600 mg by mouth daily. Yes Provider, Historical  
levoFLOXacin (LEVAQUIN) 750 mg tablet Take 1 Tab by mouth daily. 11/3/18  Yes Niles Rodríguez MD  
nateglinide (STARLIX) 60 mg tablet TAKE 1 TABLET BY MOUTH 3 TIMES A DAY WITH MEALS Patient taking differently: TAKE 1 TABLET BY MOUTH 3 TIMES A DAY WITH MEALS. Now taking once daily 8/7/18  Yes Berto Roldan MD  
furosemide (LASIX) 20 mg tablet TAKE 1 TABLET BY MOUTH EVERY OTHER DAY OR AS DIRECTED; Mon, Wed, Fri 12/12/17  Yes Provider, Historical  
hydrocortisone (CORTEF) 10 mg tablet TAKE 1 TABLET BY MOUTH TWICE A DAY. now daily 12/12/17  Yes Provider, Historical  
latanoprost (XALATAN) 0.005 % ophthalmic solution INSTILL 1 DROP BY OPHTHALMIC ROUTE EVERY DAY INTO BOTH EYES AT BEDTIME 12/28/17  Yes Provider, Historical  
predniSONE (DELTASONE) 10 mg tablet 6 tabs day 1, 5 tabs day 2, 4 tabs day 3, 3 tabs day 4, 2 tabs day 5, 1 tab day 6 Patient taking differently: Take 10 mg by mouth daily. 6 tabs day 1, 5 tabs day 2, 4 tabs day 3, 3 tabs day 4, 2 tabs day 5, 1 tab day 6 1/10/18  Yes Berto Roldan MD  
PREVIDENT 5000 BOOSTER PLUS 1.1 % pste BRUSH AFTER BREAKFAST AND SUPPER CAN ALSO USE IN FLUORIDE TRAY 6/9/17  Yes Provider, Historical  
diclofenac (VOLTAREN) 1 % gel Apply 2 g to affected area four (4) times daily as needed. 7/19/17  Yes Berto Roldan MD  
bimatoprost (LUMIGAN) 0.01 % ophthalmic drops Administer 1 Drop to both eyes nightly. Yes Provider, Historical  
cholecalciferol (VITAMIN D3) 1,000 unit tablet Take 1,000 Units by mouth daily.    Yes Provider, Historical  
 apixaban (ELIQUIS) 5 mg tablet Take 5 mg by mouth two (2) times a day. Yes hCeri, MD Nicki  
acetaminophen (TYLENOL EXTRA STRENGTH) 500 mg tablet Take 500 mg by mouth every six (6) hours as needed for Pain. Yes Provider, Historical  
amiodarone (CORDARONE) 200 mg tablet Take 200 mg by mouth daily. Yes Other, MD Nicki  
 
 
Recent Results (from the past 24 hour(s)) GLUCOSE, POC Collection Time: 11/04/18  4:17 PM  
Result Value Ref Range Glucose (POC) 241 (H) 65 - 100 mg/dL Performed by Savilla Flow URINALYSIS W/ REFLEX CULTURE Collection Time: 11/04/18  7:25 PM  
Result Value Ref Range Color YELLOW/STRAW Appearance CLEAR CLEAR Specific gravity 1.007 1.003 - 1.030    
 pH (UA) 6.0 5.0 - 8.0 Protein NEGATIVE  NEG mg/dL Glucose 100 (A) NEG mg/dL Ketone NEGATIVE  NEG mg/dL Bilirubin NEGATIVE  NEG Blood TRACE (A) NEG Urobilinogen 0.2 0.2 - 1.0 EU/dL Nitrites NEGATIVE  NEG Leukocyte Esterase NEGATIVE  NEG    
 WBC 0-4 0 - 4 /hpf  
 RBC 0-5 0 - 5 /hpf Epithelial cells FEW FEW /lpf Bacteria NEGATIVE  NEG /hpf  
 UA:UC IF INDICATED CULTURE NOT INDICATED BY UA RESULT CNI    
EKG, 12 LEAD, SUBSEQUENT Collection Time: 11/04/18  7:28 PM  
Result Value Ref Range Ventricular Rate 119 BPM  
 Atrial Rate 122 BPM  
 QRS Duration 158 ms Q-T Interval 404 ms QTC Calculation (Bezet) 568 ms Calculated R Axis -72 degrees Calculated T Axis 9 degrees Diagnosis Possible Sinus tachycardia 
premature atrial complexes Left axis deviation Right bundle branch block Possible Lateral infarct , age undetermined Inferior infarct (cited on or before 02-OCT-2014) When compared with ECG of 03-NOV-2018 21:45, 
Vent. rate has increased BY  43 BPM 
 
Confirmed by Yara Wallace ([id number]) on 11/5/2018 3:09:40 PM 
  
GLUCOSE, POC Collection Time: 11/04/18  9:00 PM  
Result Value Ref Range Glucose (POC) 317 (H) 65 - 100 mg/dL Performed by Jose Iniguez (PCT) METABOLIC PANEL, BASIC Collection Time: 11/05/18  3:57 AM  
Result Value Ref Range Sodium 136 136 - 145 mmol/L Potassium 4.5 3.5 - 5.1 mmol/L Chloride 107 97 - 108 mmol/L  
 CO2 22 21 - 32 mmol/L Anion gap 7 5 - 15 mmol/L Glucose 228 (H) 65 - 100 mg/dL BUN 19 6 - 20 MG/DL Creatinine 1.23 0.70 - 1.30 MG/DL  
 BUN/Creatinine ratio 15 12 - 20 GFR est AA >60 >60 ml/min/1.73m2 GFR est non-AA 56 (L) >60 ml/min/1.73m2 Calcium 7.9 (L) 8.5 - 10.1 MG/DL MAGNESIUM Collection Time: 11/05/18  3:57 AM  
Result Value Ref Range Magnesium 1.9 1.6 - 2.4 mg/dL PHOSPHORUS Collection Time: 11/05/18  3:57 AM  
Result Value Ref Range Phosphorus 2.8 2.6 - 4.7 MG/DL  
CBC WITH AUTOMATED DIFF Collection Time: 11/05/18  3:57 AM  
Result Value Ref Range WBC 9.4 4.1 - 11.1 K/uL  
 RBC 4.04 (L) 4.10 - 5.70 M/uL  
 HGB 12.2 12.1 - 17.0 g/dL HCT 37.3 36.6 - 50.3 % MCV 92.3 80.0 - 99.0 FL  
 MCH 30.2 26.0 - 34.0 PG  
 MCHC 32.7 30.0 - 36.5 g/dL  
 RDW 13.9 11.5 - 14.5 % PLATELET 687 (L) 541 - 400 K/uL MPV 11.3 8.9 - 12.9 FL  
 NRBC 0.0 0  WBC ABSOLUTE NRBC 0.00 0.00 - 0.01 K/uL NEUTROPHILS 94 (H) 32 - 75 % LYMPHOCYTES 2 (L) 12 - 49 % MONOCYTES 3 (L) 5 - 13 % EOSINOPHILS 0 0 - 7 % BASOPHILS 0 0 - 1 % IMMATURE GRANULOCYTES 1 (H) 0.0 - 0.5 % ABS. NEUTROPHILS 8.8 (H) 1.8 - 8.0 K/UL  
 ABS. LYMPHOCYTES 0.2 (L) 0.8 - 3.5 K/UL  
 ABS. MONOCYTES 0.2 0.0 - 1.0 K/UL  
 ABS. EOSINOPHILS 0.0 0.0 - 0.4 K/UL  
 ABS. BASOPHILS 0.0 0.0 - 0.1 K/UL  
 ABS. IMM. GRANS. 0.1 (H) 0.00 - 0.04 K/UL  
 DF AUTOMATED    
GLUCOSE, POC Collection Time: 11/05/18  7:31 AM  
Result Value Ref Range Glucose (POC) 167 (H) 65 - 100 mg/dL Performed by Familia Castillo GLUCOSE, POC Collection Time: 11/05/18 11:46 AM  
Result Value Ref Range Glucose (POC) 275 (H) 65 - 100 mg/dL Performed by Familia Castillo

## 2018-11-05 NOTE — CONSULTS
[address] [place] [address] Name:Ami MACKENZIE 
MR#: [id number] : 1931 ACCOUNT #: [de-identified] DATE OF SERVICE: 2018 REFERRING PROVIDER:  LIV Antoine MD 
 
REASON FOR CONSULTATION:  History of melanoma on treatment with Opdivo. HISTORY OF PRESENT ILLNESS:  The patient is an 80year-old patient of Dr. Magy Brink who has a known history of melanoma who has been on maintenance therapy with Opdivo. His last dose was 2018. He was admitted and had a new diagnosis of pneumonia. He had a fever at home up to 102, fatigue, shortness of breath and nonproductive cough that prompted him to visit the Emergency Department where a chest x-ray showed left lower lobe and left middle lobe pneumonia. He was started on broad spectrum antibiotics, and we have been consulted for further input. He is on hydrocortisone for adrenal insufficiency related to his Opdivo therapy. His last PET scan in July actually showed no evidence of disease. He does have a brain aneurysm noted on recent brain MRI that he is being followed for as well. Today, he states he has been feeling improved. He still continues to have a nonproductive cough. His shortness of breath has improved, along with his energy, and he has been afebrile overnight. He was started on IV stress-dose steroids. He does report easy bruising. He has also had newly-diagnosed vitiligo, due to autoimmune side effects from his Opdivo that has been improving overall.  
 
PAST MEDICAL HISTORY:  Metastatic melanoma on Opdivo, seasonal allergies, atrial fibrillation, hearing loss, low cortisol level 2016, prostate cancer status post radiation in , kidney cancer status post right nephrectomy in , valvular heart disease status post repair, atrial fibrillation diagnosed in , chronic back pain, brain aneurysm incidentally found in , cataract removal , appendectomy, bilateral carpal tunnel, right peroneal vein DVT early ; he remains on Eliquis. FAMILY HISTORY:  His mother  at 80 with pneumonia. Father  at 66 with diabetes, heavy alcohol. Brother is . Sister is . Denies any family history of cancer. SOCIAL HISTORY:  He is . He [place]. He has a living will. He smoked one-fourth pack per day until  when he quit. He quit drinking in . He is retired  and retired from [employer]. He has 2 adult children who are out of the house. MEDICATIONS:  In the hospital:  Amiodarone 150 mg IV bolus, Eliquis 5 mg b.i.d., cefepime 2 grams IV q.12 hours. , vitamin D 1000 units daily, hydrocortisone 50 mg IV q.8 hours, insulin lispro subcutaneous 4 times before meals and nightly sliding scale, latanoprost ophthalmic solution, Levaquin 750 IV daily, Starlix 60 mg 3 times daily with meals, amiodarone drip, Zofran 4 mg q.6 hours p.r.n. nausea. ALLERGIES:  RELAFEN CAUSES ITCHING. PHYSICAL EXAMINATION: 
VITAL SIGNS:  On 2018 at 0700, temperature 97.6, pulse 60. Blood pressure is 138/71, respirations 18, oxygen saturation 97% on 2 liters nasal cannula. GENERAL:  He is a well-developed gentleman in no acute distress, sitting in bed. Mood and affect are appropriate. HEENT:  Pupils are equal, round, reactive to light. Extraocular movements are intact. Anicteric sclerae. NECK:  Supple without masses or thyromegaly. HEMATOLOGIC AND LYMPHATIC:  No petechia or purpura. He does have some large ecchymosis on the dorsum of both hands and distal upper extremities. No palpable lymph nodes in the cervical, supraclavicular, axillary or inguinal areas. CARDIOVASCULAR:  Irregularly irregular. RESPIRATORY:  Minimal rhonchi in the left lower base, otherwise no wheeze or rhonchi. CHEST WALL:  No deformities. ABDOMEN:  Nontender, nondistended. No hepatosplenomegaly. No rebound or guarding. MUSCULOSKELETAL:  No tenderness or swelling. SKIN:  There are some flat, lighter pigmented areas noted throughout his skin exam.   
NEUROLOGIC:  Cranial nerves II-XII are grossly intact. PSYCHIATRIC:  Alert and oriented x3. Normal mood and affect. LABORATORY DATA:  On 11/05/2018, WBC 9.4, hemoglobin 12.2, hematocrit 37, platelets 552. IMAGING:  On 11/03/2018, portable chest x-ray shows left pneumonia as new versus more conspicuous since 3 days ago. Recommend followup PA and lateral. 
 
ASSESSMENT AND PLAN:  The patient is a very pleasant, 59-year-old gentleman with a past medical history of metastatic melanoma, adrenal insufficiency, who was admitted with pneumonia. 1.  Metastatic melanoma:  He is followed by Dr. Yo Reagan. He has been on maintenance Opdivo for quite some time now, tolerating therapy overall fairly well. His last PET scan 07/20 showed no evidence of disease. He will have future scans coming in with Dr. Yo Reagan in the next month. His last dose of Opdivo was 11/01/2018. Would continue on as an outpatient. 2.  History of adrenal insufficiency, secondary to immunotherapy:  He has been on hydrocortisone at home for the last several years, since he has had adrenal insufficiency, likely related to his immunotherapy. He is on IV stress doses at this time. He continues to improve. His vital signs remained stable. I will consider switching him over to his home dose. 3.  Hyponatremia:  He was admitted with hyponatremia; suspect this could be related to the fact that this could be related to dehydration. His sodium has improved up to 136 today. He has not been hypernatremic in the outpatient setting. We will continue to follow. 4.  History of deep venous thrombosis:  He does remain on Eliquis. No evidence of bleeding. Continue to monitor. 5.  Pneumonia:  Antibiotics per Primary Team.  He had a negative CTA of the chest on 10/31.   He is on Levaquin and cefepime, per Primary Team.   
 
 Thank you for allowing us to participate in the patient's care. Thank you for this consult. We will certainly follow along with you. Howard Castillo MD 
  
 
ARV / HN 
D: 11/05/2018 11:40    
T: 11/05/2018 13:07 JOB #: [id number]

## 2018-11-05 NOTE — PROGRESS NOTES
Bedside shift change report given to Brittany Murillo (oncoming nurse) by [facility] (offgoing nurse). Report included the following information SBAR, Kardex, ED Summary, Procedure Summary, Intake/Output, MAR, Recent Results and Cardiac Rhythm NSR 1st degree heart block. 65- Pt assisted to recliner with 1 assist.  
 
1250- Pt assisted back to bed. 1545- Pt voided 100cc. PVR 0ml. Pt assisted to recliner with 1 assist.  
 
8639- Pt assisted back to bed. 1914- PO Amio given. See MAR. Bedside shift change report given to Adam Hayes (oncoming nurse) by Brittany Murillo (offgoing nurse). Report included the following information SBAR, Kardex, ED Summary, Procedure Summary, Intake/Output, MAR, Recent Results and Cardiac Rhythm NSR.

## 2018-11-05 NOTE — PROGRESS NOTES
Problem: Mobility Impaired (Adult and Pediatric) Goal: *Acute Goals and Plan of Care (Insert Text) Physical Therapy Goals Initiated 11/5/2018 1. Patient will move from supine to sit and sit to supine  in bed with independence within 7 day(s). 2.  Patient will transfer from bed to chair and chair to bed with modified independence using the least restrictive device within 7 day(s). 3.  Patient will perform sit to stand with modified independence within 7 day(s). 4.  Patient will ambulate with modified independence for 200 feet with the least restrictive device within 7 day(s). 5.  Patient will ascend/descend 4 stairs with 1 handrail(s) with modified independence within 7 day(s). physical Therapy EVALUATION Patient: Radha Greene (75 y.o. male) Date: 11/5/2018 Primary Diagnosis: Pneumonia Generalized weakness Hyponatremia Adrenal insufficiency (Jose's disease) ([facility] Precautions:   Fall ASSESSMENT : 
Based on the objective data described below, the patient presents with generalized weakness, decreased activity tolerance, impaired balance, hypoxia with activity and impaired gait. Pt lives at home with wife and uses a RW/SPC/tri wheel. Does not use supplemental oxygen at home. He was received in supine on RA and cleared by nursing to mobilize. Bed mobility was performed with mod A to come to the EOB. Good sitting balance noted. Sit<>stand was performed with SBA, needed verbal cues for proper hand placement with RW. He was able to ambulated into the fuchs for a total of 200ft with CGA, noted narrowed FLAQUITA and shuffling steps. Pt reports he normally wears shoes and does not ambulate that much at home. Oxygen saturation dropped to 86% on RA. He was returned to sitting on the EOB and cued to PLB. Saturation increased to 90% after ~ 40 seconds of PLB on RA.  He was returned to supine at the end of the session, nursing reported he had been OOB for 5 hours. No questions or concerns from family or pt. Recommending HHPT at discharge. Patient will benefit from skilled intervention to address the above impairments. Patients rehabilitation potential is considered to be Good Factors which may influence rehabilitation potential include:  
[x]         None noted 
[]         Mental ability/status []         Medical condition 
[]         Home/family situation and support systems 
[]         Safety awareness 
[]         Pain tolerance/management 
[]         Other: PLAN : 
Recommendations and Planned Interventions: 
[x]           Bed Mobility Training             []    Neuromuscular Re-Education 
[x]           Transfer Training                   []    Orthotic/Prosthetic Training 
[x]           Gait Training                         []    Modalities [x]           Therapeutic Exercises           []    Edema Management/Control 
[x]           Therapeutic Activities            [x]    Patient and Family Training/Education 
[]           Other (comment): Frequency/Duration: Patient will be followed by physical therapy  3 times a week to address goals. Discharge Recommendations: Home Health Further Equipment Recommendations for Discharge: oxygen? Likely none if continues to improve SUBJECTIVE:  
Patient stated the other physical therapist already came in. describing the OT OBJECTIVE DATA SUMMARY:  
HISTORY:   
Past Medical History:  
Diagnosis Date  Aneurysm ([facility] 10/1/14 BRAIN RECENT DX , followed by dr. hodges, radiologist  
 Atrial flutter [facility])   
 s/p cardioversion  Cancer [facility]) 2013 LEFT FOOT ,MELANOMA  Cancer ([facility] 1980 RIGHT KIDNEY  
 Cancer [facility]) 1988 PROSTATE  TREATED WITH RADIATION  Chronic pain BACK AND RIGHT HIP  
 Diabetes mellitus, stable ([facility] [address] 7/13/2017 Steroid induced  Hyperglycemia 7/13/2017 Steroid induced: follow up A1C, BMP, discontinue Starlix in wnl  LBP (low back pain)  Left hip pain 7/13/2017 Xray shows no obvious Fx or Metastatic lesions, this pain could be arthritic in nature or radicular, will give a corticosteroid injection, if no benefit may need Ortho eval  
 Malignant neoplasm of prostate ([address] 7/13/2017  OA (osteoarthritis)  Prostate cancer ([address]  Pure hyperglyceridemia 7/13/2017  Renal cell cancer (right)  Small bowel obstruction ([address] 7/13/2017 Past Surgical History:  
Procedure Laterality Date [facility] MITRAL VALVE REPAIR  
 [address]  HX CATARACT REMOVAL Bilateral 2011  HX LUMBAR LAMINECTOMY  1990  
 HX ORTHOPAEDIC    
 CARPAL TUNNEL-SERAFIN  HX OTHER SURGICAL  OCT 2014 MELANOMA REMOVED FROM HEEL  
 [address] TURP  
 HX UROLOGICAL Right NEPHRECTOMY  IA COLSC FLX W/REMOVAL LESION BY HOT BX FORCEPS  9/24/2012  [place] SIGMOIDOSCOPY FLX DX W/COLLJ SPEC BR/WA IF PFRMD  9/21/2012 Prior Level of Function/Home Situation: pt lives with wife and uses DME for mobility at home. Denied oxygen use at home. Personal factors and/or comorbidities impacting plan of care: [place] Home Situation Home Environment: Private residence # Steps to Enter: 3 One/Two Story Residence: One story Living Alone: No 
Support Systems: Child(charline) Patient Expects to be Discharged to[de-identified] Private residence Current DME Used/Available at Home: Cane, straight, Grab bars, Raised toilet seat, Shower chair, Walker, rollator(rollator is 3 wheeled) Tub or Shower Type: Shower EXAMINATION/PRESENTATION/DECISION MAKING: Critical Behavior: 
Neurologic State: Alert Orientation Level: Appropriate for age, Oriented X4 Cognition: Follows commands, Appropriate for age attention/concentration, Appropriate decision making, Appropriate safety awareness Safety/Judgement: Awareness of environment, Insight into deficits, Good awareness of safety precautions Hearing: Auditory Auditory Impairment: NoneSkin:  brusing Edema: none Range Of Motion: 
AROM: Generally decreased, functional 
  
  
  
  
  
  
  
Strength:   
Strength: Generally decreased, functional 
  
  
  
  
  
  
Tone & Sensation:  
Tone: Normal 
  
  
  
  
Sensation: Intact Coordination: 
Coordination: Within functional limits Vision:  
Acuity: Within Defined Limits Corrective Lenses: Glasses Functional Mobility: 
Bed Mobility: 
Rolling: Contact guard assistance Supine to Sit: Moderate assistance Sit to Supine: Contact guard assistance Scooting: Independent Transfers: 
Sit to Stand: Stand-by assistance Stand to Sit: Stand-by assistance Bed to Chair: Modified independent(ambulating with a RW) Balance:  
Sitting: Intact Standing: Impaired Standing - Static: Good;Constant support Standing - Dynamic : FairAmbulation/Gait Training:Distance (ft): 200 Feet (ft) Assistive Device: Gait belt;Walker, rolling Ambulation - Level of Assistance: Contact guard assistance Gait Abnormalities: Decreased step clearance;Shuffling gait Base of Support: Narrowed Speed/Tammi: Pace decreased (<100 feet/min) Step Length: Left shortened;Right shortened Functional Measure: 
Barthel Index: 
 
Bathin Bladder: 10 Bowels: 10 
Groomin Dressing: 10 Feeding: 10 Mobility: 10 Stairs: 5 Toilet Use: 10 Transfer (Bed to Chair and Back): 10 Total: 85 Barthel and G-code impairment scale: 
Percentage of impairment CH 
0% CI 
1-19% CJ 
20-39% CK 
40-59% CL 
60-79% CM 
80-99% CN 
100% Barthel Score 0-100 100 99-80 79-60 59-40 20-39 1-19 
 0 Barthel Score 0-20 20 17-19 13-16 9-12 5-8 1-4 0 The Barthel ADL Index: Guidelines 1. The index should be used as a record of what a patient does, not as a record of what a patient could do.  
2. The main aim is to establish degree of independence from any help, physical or verbal, however minor and for whatever reason. 3. The need for supervision renders the patient not independent. 4. A patient's performance should be established using the best available evidence. Asking the patient, friends/relatives and nurses are the usual sources, but direct observation and common sense are also important. However direct testing is not needed. 5. Usually the patient's performance over the preceding 24-48 hours is important, but occasionally longer periods will be relevant. 6. Middle categories imply that the patient supplies over 50 per cent of the effort. 7. Use of aids to be independent is allowed. Dick Rojas., Barthel, D.W. (2146). Functional evaluation: the Barthel Index. [address] [facility] (14)2. Wally Mortimer jet KOLBY Keyes, Roberto Oleary., Adina Bernheim., Juliano, [address] [place] [address] Ave (1999). Measuring the change indisability after inpatient rehabilitation; comparison of the responsiveness of the Barthel Index and Functional [place] Measure. Journal of Neurology, Neurosurgery, and Psychiatry, 66(4), 494-714. Marcelo Delatorre, N.J.A, BIBIANA Nesbitt, & Taisha Wilson, MPALLAVI. (2004.) Assessment of post-stroke quality of life in cost-effectiveness studies: The usefulness of the Barthel Index and the EuroQoL-5D. [facility], 13, 467-24 G codes: In compliance with CMSs Claims Based Outcome Reporting, the following G-code set was chosen for this patient based on their primary functional limitation being treated: The outcome measure chosen to determine the severity of the functional limitation was the barthel with a score of 85/100 which was correlated with the impairment scale. ? Mobility - Walking and Moving Around:  
  - CURRENT STATUS: CI - 1%-19% impaired, limited or restricted  - GOAL STATUS: CH - 0% impaired, limited or restricted   - D/C STATUS:  ---------------To be determined---------------  
  
 Physical Therapy Evaluation Charge Determination History Examination Presentation Decision-Making HIGH Complexity :3+ comorbidities / personal factors will impact the outcome/ POC  LOW Complexity : 1-2 Standardized tests and measures addressing body structure, function, activity limitation and / or participation in recreation  MEDIUM Complexity : Evolving with changing characteristics  Other outcome measures barthel  MEDIUM Based on the above components, the patient evaluation is determined to be of the following complexity level: LOW Pain: 
Pain Scale 1: Numeric (0 - 10) Pain Intensity 1: 0 Activity Tolerance: Hypoxic with increased activity on RA Please refer to the flowsheet for vital signs taken during this treatment. After treatment:  
[]         Patient left in no apparent distress sitting up in chair 
[x]         Patient left in no apparent distress in bed 
[x]         Call bell left within reach [x]         Nursing notified 
[]         Caregiver present 
[]         Bed alarm activated COMMUNICATION/EDUCATION:  
The patients plan of care was discussed with: Registered Nurse. [x]         Fall prevention education was provided and the patient/caregiver indicated understanding. []         Patient/family have participated as able in goal setting and plan of care. [x]         Patient/family agree to work toward stated goals and plan of care. []         Patient understands intent and goals of therapy, but is neutral about his/her participation. []         Patient is unable to participate in goal setting and plan of care. Thank you for this referral. 
Vandana Neal, PT, DPT Time Calculation: 17 mins

## 2018-11-05 NOTE — PROGRESS NOTES
PROGRESS NOTE 
 
NAME:  Warren White :   1931 MRN:   [id number] Date/Time:  2018 7:26 AM 
Subjective:  
History:   Mr. Le Silver was admitted with profound weakness and changes on his chest x-ray suggesting left lower lobe and left middle lobe pneumonia. He had some hyponatremia on admission which has corrected with IV fluids. He denies fever chills cough pleuritic chest pain or shortness of breath. He has a history of atrial fibrillation and went into atrial fibrillation after admission was placed on IV amiodarone. He was started on IV cefepime and Levaquin. His last dose of Opdivo was on . He is now on stress dose steroids and his blood glucose is elevated. He also had an elevated BNP on evaluation in the ER. He has no overt signs of heart failure with no PND, orthopnea, or pedal edema. Medications reviewed: 
Current Facility-Administered Medications Medication Dose Route Frequency  hydrocortisone Sod Succ (PF) (SOLU-CORTEF) injection 50 mg  50 mg IntraVENous Q8H  
 cefepime (MAXIPIME) 2 g in 0.9% sodium chloride (MBP/ADV) 100 mL  2 g IntraVENous Q8H  
 sodium chloride (NS) flush 5-10 mL  5-10 mL IntraVENous Q8H  
 sodium chloride (NS) flush 5-10 mL  5-10 mL IntraVENous PRN  
 0.9% sodium chloride infusion  130 mL/hr IntraVENous CONTINUOUS  
 acetaminophen (TYLENOL) tablet 650 mg  650 mg Oral Q6H PRN  
 ondansetron (ZOFRAN) injection 4 mg  4 mg IntraVENous Q6H PRN  
 insulin lispro (HUMALOG) injection   SubCUTAneous AC&HS  
 glucose chewable tablet 16 g  4 Tab Oral PRN  
 dextrose (D50W) injection syrg 12.5-25 g  12.5-25 g IntraVENous PRN  
 glucagon (GLUCAGEN) injection 1 mg  1 mg IntraMUSCular PRN  
 apixaban (ELIQUIS) tablet 5 mg  5 mg Oral BID  latanoprost (XALATAN) 0.005 % ophthalmic solution 1 Drop  1 Drop Both Eyes QHS  cholecalciferol (VITAMIN D3) tablet 1,000 Units  1,000 Units Oral DAILY  diclofenac (VOLTAREN) 1 % topical gel 2 g  2 g Topical QID PRN  
 levoFLOXacin (LEVAQUIN) 750 mg in D5W IVPB  750 mg IntraVENous Q48H  
 nateglinide (STARLIX) 120 mg tablet 60 mg  60 mg Oral TID WITH MEALS  
 amiodarone (CORDARONE) 375 mg/250 mL D5W infusion  0.5-1 mg/min IntraVENous TITRATE Objective:  
Vitals: 
Visit Vitals /71 (BP 1 Location: Right arm, BP Patient Position: At rest) Pulse 60 Temp 97.6 °F (36.4 °C) Resp 18 Ht 5' 8\" (1.727 m) Wt 180 lb (81.6 kg) SpO2 97% BMI 27.37 kg/m² O2 Flow Rate (L/min): 2 l/min O2 Device: Nasal cannula Temp (24hrs), Av.6 °F (37 °C), Min:97.6 °F (36.4 °C), Max:100.2 °F (37.9 °C) Last 24hr Input/Output: 
 
Intake/Output Summary (Last 24 hours) at 2018 8109 Last data filed at 2018 6270 Gross per 24 hour Intake 1721.67 ml Output 2050 ml Net -328.33 ml PHYSICAL EXAM: 
General:     Alert, cooperative, no distress, appears stated age. Head:    Normocephalic, without obvious abnormality, atraumatic. Eyes:    Conjunctivae/corneas clear. PERRLA Nose:   Nares normal. No drainage or sinus tenderness. Throat:     Lips, mucosa, and tongue normal.  No Thrush Neck:   Supple, symmetrical,  no adenopathy, thyroid: non tender 
   no carotid bruit and no JVD. Back:     Symmetric,  No CVA tenderness. Lungs:    Clear to auscultation bilaterally. No Wheezing or Rhonchi. No rales. Heart:    Regular rate and rhythm,  no murmur, rub or gallop. Abdomen:    Soft, non-tender. Not distended. Bowel sounds normal. No masses Extremities:  Extremities normal, atraumatic, No cyanosis. No edema. No clubbing Lymph nodes:  Cervical, supraclavicular normal. 
Neurologic:  Normal strength, Alert and oriented X 3. Skin:                No rash Lab Data Reviewed: 
 
Recent Results (from the past 24 hour(s)) POC LACTIC ACID Collection Time: 18 12:06 PM  
Result Value Ref Range Lactic Acid (POC) 1.18 0.40 - 2.00 mmol/L  
CBC WITH AUTOMATED DIFF Collection Time: 11/04/18 12:20 PM  
Result Value Ref Range WBC 11.7 (H) 4.1 - 11.1 K/uL  
 RBC 4.42 4.10 - 5.70 M/uL  
 HGB 13.2 12.1 - 17.0 g/dL HCT 40.0 36.6 - 50.3 % MCV 90.5 80.0 - 99.0 FL  
 MCH 29.9 26.0 - 34.0 PG  
 MCHC 33.0 30.0 - 36.5 g/dL  
 RDW 13.8 11.5 - 14.5 % PLATELET 127 (L) 789 - 400 K/uL MPV 11.0 8.9 - 12.9 FL  
 NRBC 0.2 (H) 0  WBC ABSOLUTE NRBC 0.02 (H) 0.00 - 0.01 K/uL NEUTROPHILS 90 (H) 32 - 75 % LYMPHOCYTES 5 (L) 12 - 49 % MONOCYTES 3 (L) 5 - 13 % EOSINOPHILS 1 0 - 7 % BASOPHILS 0 0 - 1 % IMMATURE GRANULOCYTES 1 (H) 0.0 - 0.5 % ABS. NEUTROPHILS 10.5 (H) 1.8 - 8.0 K/UL  
 ABS. LYMPHOCYTES 0.6 (L) 0.8 - 3.5 K/UL  
 ABS. MONOCYTES 0.4 0.0 - 1.0 K/UL  
 ABS. EOSINOPHILS 0.1 0.0 - 0.4 K/UL  
 ABS. BASOPHILS 0.0 0.0 - 0.1 K/UL  
 ABS. IMM. GRANS. 0.1 (H) 0.00 - 0.04 K/UL  
 DF SMEAR SCANNED    
 RBC COMMENTS NORMOCYTIC, NORMOCHROMIC METABOLIC PANEL, COMPREHENSIVE Collection Time: 11/04/18 12:20 PM  
Result Value Ref Range Sodium 129 (L) 136 - 145 mmol/L Potassium 4.6 3.5 - 5.1 mmol/L Chloride 97 97 - 108 mmol/L  
 CO2 26 21 - 32 mmol/L Anion gap 6 5 - 15 mmol/L Glucose 148 (H) 65 - 100 mg/dL BUN 23 (H) 6 - 20 MG/DL Creatinine 1.30 0.70 - 1.30 MG/DL  
 BUN/Creatinine ratio 18 12 - 20 GFR est AA >60 >60 ml/min/1.73m2 GFR est non-AA 52 (L) >60 ml/min/1.73m2 Calcium 7.8 (L) 8.5 - 10.1 MG/DL Bilirubin, total 1.1 (H) 0.2 - 1.0 MG/DL  
 ALT (SGPT) 15 12 - 78 U/L  
 AST (SGOT) 15 15 - 37 U/L Alk. phosphatase 54 45 - 117 U/L Protein, total 6.0 (L) 6.4 - 8.2 g/dL Albumin 2.6 (L) 3.5 - 5.0 g/dL Globulin 3.4 2.0 - 4.0 g/dL A-G Ratio 0.8 (L) 1.1 - 2.2 GLUCOSE, POC Collection Time: 11/04/18  4:17 PM  
Result Value Ref Range Glucose (POC) 241 (H) 65 - 100 mg/dL Performed by Nick Moralez URINALYSIS W/ REFLEX CULTURE  
 Collection Time: 11/04/18  7:25 PM  
Result Value Ref Range Color YELLOW/STRAW Appearance CLEAR CLEAR Specific gravity 1.007 1.003 - 1.030    
 pH (UA) 6.0 5.0 - 8.0 Protein NEGATIVE  NEG mg/dL Glucose 100 (A) NEG mg/dL Ketone NEGATIVE  NEG mg/dL Bilirubin NEGATIVE  NEG Blood TRACE (A) NEG Urobilinogen 0.2 0.2 - 1.0 EU/dL Nitrites NEGATIVE  NEG Leukocyte Esterase NEGATIVE  NEG    
 WBC 0-4 0 - 4 /hpf  
 RBC 0-5 0 - 5 /hpf Epithelial cells FEW FEW /lpf Bacteria NEGATIVE  NEG /hpf  
 UA:UC IF INDICATED CULTURE NOT INDICATED BY UA RESULT CNI    
EKG, 12 LEAD, SUBSEQUENT Collection Time: 11/04/18  7:28 PM  
Result Value Ref Range Ventricular Rate 119 BPM  
 Atrial Rate 122 BPM  
 QRS Duration 158 ms Q-T Interval 404 ms QTC Calculation (Bezet) 568 ms Calculated R Axis -72 degrees Calculated T Axis 9 degrees Diagnosis Atrial fibrillation with rapid ventricular response Left axis deviation Right bundle branch block Possible Lateral infarct , age undetermined Inferior infarct (cited on or before 02-OCT-2014) When compared with ECG of 03-NOV-2018 21:45, Atrial fibrillation has replaced Sinus rhythm Vent. rate has increased BY  43 BPM 
Borderline criteria for Lateral infarct are now present Questionable change in initial forces of Inferior leads GLUCOSE, POC Collection Time: 11/04/18  9:00 PM  
Result Value Ref Range Glucose (POC) 317 (H) 65 - 100 mg/dL Performed by Anamika Finnegan (PCT) METABOLIC PANEL, BASIC Collection Time: 11/05/18  3:57 AM  
Result Value Ref Range Sodium 136 136 - 145 mmol/L Potassium 4.5 3.5 - 5.1 mmol/L Chloride 107 97 - 108 mmol/L  
 CO2 22 21 - 32 mmol/L Anion gap 7 5 - 15 mmol/L Glucose 228 (H) 65 - 100 mg/dL BUN 19 6 - 20 MG/DL Creatinine 1.23 0.70 - 1.30 MG/DL  
 BUN/Creatinine ratio 15 12 - 20 GFR est AA >60 >60 ml/min/1.73m2 GFR est non-AA 56 (L) >60 ml/min/1.73m2 Calcium 7.9 (L) 8.5 - 10.1 MG/DL MAGNESIUM Collection Time: 11/05/18  3:57 AM  
Result Value Ref Range Magnesium 1.9 1.6 - 2.4 mg/dL PHOSPHORUS Collection Time: 11/05/18  3:57 AM  
Result Value Ref Range Phosphorus 2.8 2.6 - 4.7 MG/DL  
CBC WITH AUTOMATED DIFF Collection Time: 11/05/18  3:57 AM  
Result Value Ref Range WBC 9.4 4.1 - 11.1 K/uL  
 RBC 4.04 (L) 4.10 - 5.70 M/uL  
 HGB 12.2 12.1 - 17.0 g/dL HCT 37.3 36.6 - 50.3 % MCV 92.3 80.0 - 99.0 FL  
 MCH 30.2 26.0 - 34.0 PG  
 MCHC 32.7 30.0 - 36.5 g/dL  
 RDW 13.9 11.5 - 14.5 % PLATELET 459 (L) 606 - 400 K/uL MPV 11.3 8.9 - 12.9 FL  
 NRBC 0.0 0  WBC ABSOLUTE NRBC 0.00 0.00 - 0.01 K/uL NEUTROPHILS 94 (H) 32 - 75 % LYMPHOCYTES 2 (L) 12 - 49 % MONOCYTES 3 (L) 5 - 13 % EOSINOPHILS 0 0 - 7 % BASOPHILS 0 0 - 1 % IMMATURE GRANULOCYTES 1 (H) 0.0 - 0.5 % ABS. NEUTROPHILS 8.8 (H) 1.8 - 8.0 K/UL  
 ABS. LYMPHOCYTES 0.2 (L) 0.8 - 3.5 K/UL  
 ABS. MONOCYTES 0.2 0.0 - 1.0 K/UL  
 ABS. EOSINOPHILS 0.0 0.0 - 0.4 K/UL  
 ABS. BASOPHILS 0.0 0.0 - 0.1 K/UL  
 ABS. IMM. GRANS. 0.1 (H) 0.00 - 0.04 K/UL  
 DF AUTOMATED Assessment/Plan: Active Problems: 
  Pneumonia (8/10/2016) Adrenal insufficiency (Rye's disease) ([facility] [address] (11/4/2018) Hyponatremia (11/4/2018) Generalized weakness (11/4/2018) 
 
  
___________________________________________________ PLAN: 
 
1. Chest x-ray suggestive of pneumonia on IV cefepime and Levaquin, supplemental oxygen 2. Stress dose steroids for adrenal insufficiency 3. Hyponatremia resolved with IV fluids Lasix was held echocardiogram ordered, hold IV fluids and follow volume status 4. Atrial fibrillation converted to normal sinus rhythm on IV amiodarone 5. Steroid-induced diabetes mellitus, cover with sliding scale insulin while on stress dose steroids 6.  Consult cardiology for atrial fibrillation with rapid ventricular response and elevated BNP, consult hematology/oncology for history of melanoma on Opdivo, question whether changes with hyponatremia and nausea could be elated to pneumonia or chemotherapy 
 
 
 
 
 
___________________________________________________ Attending Physician: Linh Sommer MD

## 2018-11-05 NOTE — DIABETES MGMT
DTC Progress Note Recommendations/ Comments: Please consider beginning NPH 5 units Q8hrs linked and timed with hydrocortisone. Current hospital DM medication: humalog correction, starlix 60mg ac tid Chart reviewed on Jerrell Kirkland. Patient is a 80 y.o. male with known Type 2 Diabetes on starlix 60 mg daily at home. A1c:  
Lab Results Component Value Date/Time Hemoglobin A1c, External 6.9 06/07/2017 Recent Glucose Results:  
Lab Results Component Value Date/Time  (H) 11/05/2018 03:57 AM  
 GLUCPOC 239 (H) 11/05/2018 04:54 PM  
 GLUCPOC 275 (H) 11/05/2018 11:46 AM  
 GLUCPOC 167 (H) 11/05/2018 07:31 AM  
  
 
Lab Results Component Value Date/Time Creatinine 1.23 11/05/2018 03:57 AM  
 
Estimated Creatinine Clearance: 41.7 mL/min (based on SCr of 1.23 mg/dL). Active Orders Diet DIET CARDIAC Regular; Consistent Carb 2000kcal  
  
 
PO intake:  
Patient Vitals for the past 72 hrs: 
 % Diet Eaten 11/05/18 1250 100 % 11/05/18 0926 100 % 11/04/18 1850 90 % 11/04/18 1529 10 % Will continue to follow as needed. Thank you JUNIOR GabrielN, RN, CDE [facility]

## 2018-11-05 NOTE — H&P
Oncology Progress Note-Chemotherapy Consult dictated [id number]. Thank you for this consult, we will follow along. No changes made to management.

## 2018-11-05 NOTE — PROGRESS NOTES
PCU SHIFT NURSING NOTE Bedside shift change report given to Em Moran (oncoming nurse) by Rose Gillis (offgoing nurse). Report included the following information SBAR, Kardex, Intake/Output, MAR, Recent Results and Cardiac Rhythm A.FIB. 
 
1926: IV Amio bolus given. Patient in A. FIB >120. Patient asymptomatic. IV NS bolus infusing at this time. 1947: Patients blood pressure improved. IV Amio gtt infusing. 0205: End of A.FIB. 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 Admission Date 11/4/2018 Admission Diagnosis Pneumonia Generalized weakness Hyponatremia Adrenal insufficiency (Bourbon's disease) ([facility] [address] Consults None Consults []PT []OT []Speech  
[]Case Management  
  
[] Palliative Cardiac Monitoring Order []Yes []No  
 
IV drips []Yes Drip:                            Dose: 
Drip:                            Dose: 
Drip:                            Dose:  
[]No  
 
GI Prophylaxis []Yes []No  
 
 
 
DVT Prophylaxis SCDs:     
     
 Grayson stockings:     
  
[] Medication []Contraindicated []None Activity Level Activity Level: Up with Assistance Activity Assistance: Partial (two people) Purposeful Rounding every 1-2 hour? []Yes Solis Score  Total Score: 4 Bed Alarm (If score 3 or >) []Yes  
[] Refused (See signed refusal form in chart) Horacio Score  Horacio Score: 19 Horacio Score (if score 14 or less) []PMT consult  
[]Wound Care consult []Specialty bed  
[] Nutrition consult Needs prior to discharge:  
Home O2 required:   
[]Yes []No  
 If yes, how much O2 required? Other:  
 Last Bowel Movement: Last Bowel Movement Date: 11/02/18 Influenza Vaccine Received Flu Vaccine for Current Season (usually Sept-March): No  
 Patient/Guardian Refused (Notify MD): Yes Pneumonia Vaccine Diet Active Orders Diet DIET CARDIAC Regular; Consistent Carb 2000kcal  
  
LDAs Venous Access Device power port venous access device 11/04/18 Upper chest (subclavicular area), left (Active) Central Line Being Utilized Yes 11/4/2018  7:30 PM  
Criteria for Appropriate Use Limited/no vessel suitable for conventional peripheral access 11/4/2018  7:30 PM  
Site Assessment Clean, dry, & intact 11/4/2018  7:30 PM  
Date of Last Dressing Change 11/04/18 11/4/2018  2:25 PM  
Dressing Status Clean, dry, & intact 11/4/2018  7:30 PM  
Dressing Type Transparent 11/4/2018  7:30 PM  
                    
Urinary Catheter Intake & Output Date 11/03/18 1900 - 11/04/18 0659(Not Admitted) 11/04/18 0700 - 11/05/18 5412 Shift 7322-8661 24 Hour Total 0439-8374 2707-7750 24 Hour Total  
INTAKE  
P.O.   500  500  
  P. O.   500  500 I.V.   1221.7(1.2)  1221.7 Volume (sodium chloride 0.9 % bolus infusion 1,000 mL)   1000  1000 Volume (0.9% sodium chloride infusion)   221.7  221.7 Shift Total(mL/kg)   1721.7(21.1)  1721.7(21.1) OUTPUT Urine   950(1)  950 Urine   700  700 Urine Voided   250  250 Shift Total(mL/kg)   950(11.6)  950(11.6) NET   771.7  771.7 Weight (kg)   81.6 81.6 81.6 Readmission Risk Assessment Tool Score High Risk   
      
 23 Total Score 3 Has Seen PCP in Last 6 Months (Yes=3, No=0)  
 5 Pt. Coverage (Medicare=5 , Medicaid, or Self-Pay=4) 15 Charlson Comorbidity Score (Age + Comorbid Conditions) Criteria that do not apply:  
 . Living with Significant Other. Assisted Living. LTAC. SNF. or  
Rehab Patient Length of Stay (>5 days = 3) IP Visits Last 12 Months (1-3=4, 4=9, >4=11) Expected Length of Stay - - - Actual Length of Stay 0

## 2018-11-05 NOTE — PROGRESS NOTES
Occupational Therapy EVALUATION/discharge Patient: Anthony Peng (59 y.o. male) Date: 11/5/2018 Primary Diagnosis: Pneumonia Generalized weakness Hyponatremia Adrenal insufficiency (Beadle's disease) ([address] Precautions: none ASSESSMENT:  
Based on the objective data described below, the patient presents with mild GW, a slight decline in his activity tolerance and has decreased standing balance at baseline. Despite the above mentioned deficits the patient is at his baseline of independent to mod I for ADLs and functional mobility. Good overall balance and safety awareness for standing ADLs, ambulation with a RW and bathroom mobility. Activity tolerance is adequate for ADL performance. At this point further skilled acute occupational therapy is not indicated. Discharge Recommendations: None Further Equipment Recommendations for Discharge: none OBJECTIVE DATA SUMMARY:  
HISTORY:  
Past Medical History:  
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 hodges, radiologist  
 Atrial flutter [facility])   
 s/p cardioversion  Cancer [facility]) 2013 LEFT FOOT ,MELANOMA  Cancer ([facility] [address] 1980 RIGHT KIDNEY  
 Cancer [facility]) 1988 PROSTATE  TREATED WITH RADIATION  Chronic pain BACK AND RIGHT HIP  
 Diabetes mellitus, stable ([address] 7/13/2017 Steroid induced  Hyperglycemia 7/13/2017 Steroid induced: follow up A1C, BMP, discontinue Starlix in wnl  LBP (low back pain)  Left hip pain 7/13/2017 Xray shows no obvious Fx or Metastatic lesions, this pain could be arthritic in nature or radicular, will give a corticosteroid injection, if no benefit may need Ortho eval  
 Malignant neoplasm of prostate ([address] 7/13/2017  OA (osteoarthritis)  Prostate cancer ([address]  Pure hyperglyceridemia 7/13/2017  Renal cell cancer (right)  Small bowel obstruction ([address] 7/13/2017 Past Surgical History:  
Procedure Laterality Date [address] [facility] 63 MITRAL VALVE REPAIR  
 [address]  HX CATARACT REMOVAL Bilateral 2011  HX LUMBAR LAMINECTOMY  1990  
 HX ORTHOPAEDIC    
 CARPAL TUNNEL-SERAFIN  HX OTHER SURGICAL  OCT 2014 MELANOMA REMOVED FROM HEEL  
 [address] TURP  
 HX UROLOGICAL Right NEPHRECTOMY  NC COLSC FLX W/REMOVAL LESION BY HOT BX FORCEPS  9/24/2012  NC SIGMOIDOSCOPY FLX DX W/COLLJ SPEC BR/WA IF PFRMD  9/21/2012 Prior Level of Function/Environment/Context: Lives with his wife, ambulates with a 3 wheeled rollator and is independent to mod I for ADLs using bathroom DME. Does some laundry and light meal prep, but otherwise does not perform IADLs. Occupations in which the patient is/was successful, what are the barriers preventing that success:  
Performance Patterns (routines, roles, habits, and rituals):  
Personal Interests and/or values:  
Expanded or extensive additional review of patient history:  
 
Home Situation Home Environment: Private residence # Steps to Enter: 3 One/Two Story Residence: One story Living Alone: No 
Support Systems: Child(charline) Patient Expects to be Discharged to[de-identified] Private residence Current DME Used/Available at Home: Cane, straight, Grab bars, Raised toilet seat, Shower chair, Walker, rollator(rollator is 3 wheeled) Tub or Shower Type: Shower Hand dominance: Right EXAMINATION OF PERFORMANCE DEFICITS: 
Cognitive/Behavioral Status: 
Neurologic State: Alert Orientation Level: Appropriate for age;Oriented X4 Cognition: Follows commands; Appropriate for age attention/concentration; Appropriate decision making; Appropriate safety awareness Safety/Judgement: Awareness of environment; Insight into deficits;Good awareness of safety precautions Hearing: Auditory Auditory Impairment: None Vision/Perceptual:   
Acuity: Within Defined Limits Corrective Lenses: Glasses Range of Motion: AROM: Generally decreased, functional 
  
  
  
  
  
  
  
Strength: 
Strength: Generally decreased, functional 
  
  
  
  
 
Coordination: 
Coordination: Within functional limits Tone & Sensation: 
Tone: Normal 
Sensation: Intact Balance: 
Sitting: Intact Standing: Impaired Standing - Static: Good Standing - Dynamic : Good Functional Mobility and Transfers for ADLs:Bed Mobility: 
Scooting: Independent Transfers: 
Sit to Stand: Modified independent Stand to Sit: Modified independent Bed to Chair: Modified independent(ambulating with a RW) Bathroom Mobility: Modified independent(ambulating with a RW) Toilet Transfer : Modified independent ADL Assessment: 
Feeding: Independent Oral Facial Hygiene/Grooming: Independent Bathing: Independent Upper Body Dressing: Independent Lower Body Dressing: Modified independent Toileting: Modified independent Functional Measure: 
Barthel Index: 
 
Bathin Bladder: 10 Bowels: 10 
Groomin Dressing: 10 Feeding: 10 Mobility: 10 Stairs: 5 Toilet Use: 10 Transfer (Bed to Chair and Back): 10 Total: 85 Barthel and G-code impairment scale: 
Percentage of impairment CH 
0% CI 
1-19% CJ 
20-39% CK 
40-59% CL 
60-79% CM 
80-99% CN 
100% Barthel Score 0-100 100 99-80 79-60 59-40 20-39 1-19 
 0 Barthel Score 0-20 20 17-19 13-16 9-12 5-8 1-4 0 The Barthel ADL Index: Guidelines 1. The index should be used as a record of what a patient does, not as a record of what a patient could do. 2. The main aim is to establish degree of independence from any help, physical or verbal, however minor and for whatever reason. 3. The need for supervision renders the patient not independent. 4. A patient's performance should be established using the best available evidence. Asking the patient, friends/relatives and nurses are the usual sources, but direct observation and common sense are also important. However direct testing is not needed. 5. Usually the patient's performance over the preceding 24-48 hours is important, but occasionally longer periods will be relevant. 6. Middle categories imply that the patient supplies over 50 per cent of the effort. 7. Use of aids to be independent is allowed. Gladstone Sers., Barthel, D.W. (0008). Functional evaluation: the Barthel Index. [address] [facility] (14)2. KOLBY Hartmann, Krystyna Witt., Dulce Moreno., Ethel, [facility] (1999). Measuring the change indisability after inpatient rehabilitation; comparison of the responsiveness of the Barthel Index and Functional Emanuel Measure. Journal of Neurology, Neurosurgery, and Psychiatry, 66(4), 040-529. Isa Conn, N.J.A, BIBIANA Nesbitt, & Micheline Finnegan MVAN (2004.) Assessment of post-stroke quality of life in cost-effectiveness studies: The usefulness of the Barthel Index and the EuroQoL-5D. [facility], 14, 985-40 G codes: In compliance with CMSs Claims Based Outcome Reporting, the following G-code set was chosen for this patient based on their primary functional limitation being treated: The outcome measure chosen to determine the severity of the functional limitation was the Barthel Index with a score of 85/100 which was correlated with the impairment scale. ? Self Care:  
  - CURRENT STATUS: CI - 1%-19% impaired, limited or restricted  - GOAL STATUS: CI - 1%-19% impaired, limited or restricted  - D/C STATUS:  CI - 1%-19% impaired, limited or restricted Pain: 
Pain Scale 1: Numeric (0 - 10) Pain Intensity 1: 0 Activity Tolerance:  
Sitting: /60 Standing/pre-activity: /68 Seated/post activity: /54 HR 68-75 SpO2 95 to 99% on RA. After treatment:  
[x]  Patient left in no apparent distress sitting up in chair 
[]  Patient left in no apparent distress in bed 
[x]  Call bell left within reach [x]  Nursing notified [x]  Caregiver present 
[]  Bed alarm activated COMMUNICATION/EDUCATION:  
Communication/Collaboration: 
[x]      Home safety education was provided and the patient/caregiver indicated understanding. [x]      Patient/family have participated as able and agree with findings and recommendations. []      Patient is unable to participate in plan of care at this time. Juan M Estrella, OTR/L Time Calculation: 20 mins

## 2018-11-05 NOTE — PROGRESS NOTES
Pharmacy Automatic Renal Dosing Protocol - Antimicrobials Indication for Antimicrobials: CAP Current Regimen of Each Antimicrobial: 
Levofloxacin 750 mg IV every 48hr (Start Date 11/3; Day # 3) Cefepime 2 g Q8H (Start Date ; Day #2) Previous Antimicrobial Therapy: None Significant Cultures: None Radiology / Imaging results: (X-ray, CT scan or MRI):  
11/3- L lobe PNA new/more conspicuous Paralysis, amputations, malnutrition: n/a Labs: 
Recent Labs 18 
[phone] 34 37 18 
1220 18 
2201 CREA 1.23 1.30 1.50* BUN 19 23* 30* WBC 9.4 11.7* 10.0 Temp (24hrs), Av.3 °F (36.8 °C), Min:97.6 °F (36.4 °C), Max:99 °F (37.2 °C) Creatinine Clearance (mL/min) or Dialysis: 41.7 ml/min Impression/Plan:  
· Change to cefepime 2g Q12H per renal dosing policy · Continue levofloxacin 750 mg IV every 48 hr per renal dosing policy · Antimicrobial stop date: 7 days for levofloxacin and cefepime Pharmacy will follow daily and adjust medications as appropriate for renal function and/or serum levels. Thank you, Emiliana Sterling PharmD Recommended duration of therapy 
[email or web address]://[facility]/Pharmacy/Clinical%20Companion/[email or web address]. docx Renal Dosing 
[email or web address]://[facility]/[place]/[facility]/Pharmacy/Clinical%20Companion/Renal%[email or web address]. pdf

## 2018-11-06 LAB
ANION GAP SERPL CALC-SCNC: 8 MMOL/L (ref 5–15)
BASOPHILS # BLD: 0 K/UL (ref 0–0.1)
BASOPHILS NFR BLD: 0 % (ref 0–1)
BUN SERPL-MCNC: 20 MG/DL (ref 6–20)
BUN/CREAT SERPL: 19 (ref 12–20)
CALCIUM SERPL-MCNC: 8 MG/DL (ref 8.5–10.1)
CHLORIDE SERPL-SCNC: 108 MMOL/L (ref 97–108)
CO2 SERPL-SCNC: 22 MMOL/L (ref 21–32)
CREAT SERPL-MCNC: 1.08 MG/DL (ref 0.7–1.3)
DIFFERENTIAL METHOD BLD: ABNORMAL
EOSINOPHIL # BLD: 0 K/UL (ref 0–0.4)
EOSINOPHIL NFR BLD: 0 % (ref 0–7)
ERYTHROCYTE [DISTWIDTH] IN BLOOD BY AUTOMATED COUNT: 13.8 % (ref 11.5–14.5)
GLUCOSE BLD STRIP.AUTO-MCNC: 149 MG/DL (ref 65–100)
GLUCOSE BLD STRIP.AUTO-MCNC: 155 MG/DL (ref 65–100)
GLUCOSE BLD STRIP.AUTO-MCNC: 202 MG/DL (ref 65–100)
GLUCOSE BLD STRIP.AUTO-MCNC: 281 MG/DL (ref 65–100)
GLUCOSE SERPL-MCNC: 188 MG/DL (ref 65–100)
HCT VFR BLD AUTO: 35.1 % (ref 36.6–50.3)
HGB BLD-MCNC: 11.6 G/DL (ref 12.1–17)
IMM GRANULOCYTES # BLD: 0.1 K/UL (ref 0–0.04)
IMM GRANULOCYTES NFR BLD AUTO: 1 % (ref 0–0.5)
LYMPHOCYTES # BLD: 0.2 K/UL (ref 0.8–3.5)
LYMPHOCYTES NFR BLD: 2 % (ref 12–49)
MCH RBC QN AUTO: 30.2 PG (ref 26–34)
MCHC RBC AUTO-ENTMCNC: 33 G/DL (ref 30–36.5)
MCV RBC AUTO: 91.4 FL (ref 80–99)
MONOCYTES # BLD: 0.3 K/UL (ref 0–1)
MONOCYTES NFR BLD: 3 % (ref 5–13)
NEUTS SEG # BLD: 9.9 K/UL (ref 1.8–8)
NEUTS SEG NFR BLD: 94 % (ref 32–75)
NRBC # BLD: 0 K/UL (ref 0–0.01)
NRBC BLD-RTO: 0 PER 100 WBC
PLATELET # BLD AUTO: 143 K/UL (ref 150–400)
PMV BLD AUTO: 11.3 FL (ref 8.9–12.9)
POTASSIUM SERPL-SCNC: 4.2 MMOL/L (ref 3.5–5.1)
RBC # BLD AUTO: 3.84 M/UL (ref 4.1–5.7)
RBC MORPH BLD: ABNORMAL
SERVICE CMNT-IMP: ABNORMAL
SODIUM SERPL-SCNC: 138 MMOL/L (ref 136–145)
WBC # BLD AUTO: 10.5 K/UL (ref 4.1–11.1)

## 2018-11-06 PROCEDURE — 74011000258 HC RX REV CODE- 258: Performed by: HOSPITALIST

## 2018-11-06 PROCEDURE — 74011250637 HC RX REV CODE- 250/637: Performed by: HOSPITALIST

## 2018-11-06 PROCEDURE — 74011250637 HC RX REV CODE- 250/637: Performed by: INTERNAL MEDICINE

## 2018-11-06 PROCEDURE — 85025 COMPLETE CBC W/AUTO DIFF WBC: CPT | Performed by: INTERNAL MEDICINE

## 2018-11-06 PROCEDURE — 80048 BASIC METABOLIC PNL TOTAL CA: CPT | Performed by: INTERNAL MEDICINE

## 2018-11-06 PROCEDURE — 36415 COLL VENOUS BLD VENIPUNCTURE: CPT | Performed by: INTERNAL MEDICINE

## 2018-11-06 PROCEDURE — 65660000000 HC RM CCU STEPDOWN

## 2018-11-06 PROCEDURE — 74011636637 HC RX REV CODE- 636/637: Performed by: HOSPITALIST

## 2018-11-06 PROCEDURE — 74011250636 HC RX REV CODE- 250/636: Performed by: HOSPITALIST

## 2018-11-06 PROCEDURE — 74011250636 HC RX REV CODE- 250/636: Performed by: INTERNAL MEDICINE

## 2018-11-06 PROCEDURE — 82962 GLUCOSE BLOOD TEST: CPT

## 2018-11-06 RX ORDER — HYDROCORTISONE SODIUM SUCCINATE 100 MG/2ML
50 INJECTION, POWDER, FOR SOLUTION INTRAMUSCULAR; INTRAVENOUS EVERY 12 HOURS
Status: DISCONTINUED | OUTPATIENT
Start: 2018-11-06 | End: 2018-11-07 | Stop reason: HOSPADM

## 2018-11-06 RX ORDER — AMIODARONE HYDROCHLORIDE 200 MG/1
200 TABLET ORAL DAILY
Status: DISCONTINUED | OUTPATIENT
Start: 2018-11-06 | End: 2018-11-07 | Stop reason: HOSPADM

## 2018-11-06 RX ADMIN — VITAMIN D, TAB 1000IU (100/BT) 1000 UNITS: 25 TAB at 08:45

## 2018-11-06 RX ADMIN — INSULIN LISPRO 2 UNITS: 100 INJECTION, SOLUTION INTRAVENOUS; SUBCUTANEOUS at 16:59

## 2018-11-06 RX ADMIN — CEFEPIME HYDROCHLORIDE 2 G: 2 INJECTION, POWDER, FOR SOLUTION INTRAVENOUS at 17:00

## 2018-11-06 RX ADMIN — NATEGLINIDE 60 MG: 120 TABLET, FILM COATED ORAL at 08:45

## 2018-11-06 RX ADMIN — APIXABAN 5 MG: 5 TABLET, FILM COATED ORAL at 17:00

## 2018-11-06 RX ADMIN — APIXABAN 5 MG: 5 TABLET, FILM COATED ORAL at 08:45

## 2018-11-06 RX ADMIN — NATEGLINIDE 60 MG: 120 TABLET, FILM COATED ORAL at 16:58

## 2018-11-06 RX ADMIN — AMIODARONE HYDROCHLORIDE 200 MG: 200 TABLET ORAL at 08:45

## 2018-11-06 RX ADMIN — LATANOPROST 1 DROP: 50 SOLUTION OPHTHALMIC at 21:45

## 2018-11-06 RX ADMIN — Medication 10 ML: at 06:15

## 2018-11-06 RX ADMIN — HYDROCORTISONE SODIUM SUCCINATE 50 MG: 100 INJECTION, POWDER, FOR SOLUTION INTRAMUSCULAR; INTRAVENOUS at 21:45

## 2018-11-06 RX ADMIN — HYDROCORTISONE SODIUM SUCCINATE 50 MG: 100 INJECTION, POWDER, FOR SOLUTION INTRAMUSCULAR; INTRAVENOUS at 06:15

## 2018-11-06 RX ADMIN — Medication 10 ML: at 21:45

## 2018-11-06 RX ADMIN — INSULIN LISPRO 3 UNITS: 100 INJECTION, SOLUTION INTRAVENOUS; SUBCUTANEOUS at 12:23

## 2018-11-06 RX ADMIN — CEFEPIME HYDROCHLORIDE 2 G: 2 INJECTION, POWDER, FOR SOLUTION INTRAVENOUS at 06:15

## 2018-11-06 RX ADMIN — Medication 10 ML: at 14:32

## 2018-11-06 RX ADMIN — NATEGLINIDE 60 MG: 120 TABLET, FILM COATED ORAL at 11:17

## 2018-11-06 NOTE — PROGRESS NOTES
PROGRESS NOTE 
 
NAME:  Radha Greene :   1931 MRN:   [id number] Date/Time:  2018 7:26 AM 
Subjective:  
History:   Mr. Soraya Treviño was admitted with profound weakness and changes on his chest x-ray suggesting left lower lobe and left middle lobe pneumonia. He had some hyponatremia on admission which has corrected with IV fluids. He denies fever chills cough pleuritic chest pain or shortness of breath. He has a history of atrial fibrillation and went into atrial fibrillation after admission was placed on IV amiodarone. He was started on IV cefepime and Levaquin. His last dose of Opdivo was on . He is now on stress dose steroids and his blood glucose is elevated. He also had an elevated BNP on evaluation in the ER. He has no overt signs of heart failure with no PND, orthopnea, or pedal edema. He is improved this morning with no shortness of breath and sats of 96% on room air. Medications reviewed: 
Current Facility-Administered Medications Medication Dose Route Frequency  cefepime (MAXIPIME) 2 g in 0.9% sodium chloride (MBP/ADV) 100 mL  2 g IntraVENous Q12H  
 amiodarone (CORDARONE) tablet 200 mg  200 mg Oral BID  hydrocortisone Sod Succ (PF) (SOLU-CORTEF) injection 50 mg  50 mg IntraVENous Q8H  
 sodium chloride (NS) flush 5-10 mL  5-10 mL IntraVENous Q8H  
 sodium chloride (NS) flush 5-10 mL  5-10 mL IntraVENous PRN  
 acetaminophen (TYLENOL) tablet 650 mg  650 mg Oral Q6H PRN  
 ondansetron (ZOFRAN) injection 4 mg  4 mg IntraVENous Q6H PRN  
 insulin lispro (HUMALOG) injection   SubCUTAneous AC&HS  
 glucose chewable tablet 16 g  4 Tab Oral PRN  
 dextrose (D50W) injection syrg 12.5-25 g  12.5-25 g IntraVENous PRN  
 glucagon (GLUCAGEN) injection 1 mg  1 mg IntraMUSCular PRN  
 apixaban (ELIQUIS) tablet 5 mg  5 mg Oral BID  latanoprost (XALATAN) 0.005 % ophthalmic solution 1 Drop  1 Drop Both Eyes QHS  cholecalciferol (VITAMIN D3) tablet 1,000 Units  1,000 Units Oral DAILY  diclofenac (VOLTAREN) 1 % topical gel 2 g  2 g Topical QID PRN  
 levoFLOXacin (LEVAQUIN) 750 mg in D5W IVPB  750 mg IntraVENous Q48H  
 nateglinide (STARLIX) 120 mg tablet 60 mg  60 mg Oral TID WITH MEALS Objective:  
Vitals: 
Visit Vitals /73 (BP 1 Location: Left arm, BP Patient Position: At rest) Pulse 62 Temp 97.7 °F (36.5 °C) Resp 18 Ht 5' 8\" (1.727 m) Wt 180 lb 1.6 oz (81.7 kg) SpO2 96% BMI 27.38 kg/m² O2 Flow Rate (L/min): 2 l/min O2 Device: Room air Temp (24hrs), Av.5 °F (36.4 °C), Min:96.7 °F (35.9 °C), Max:97.8 °F (36.6 °C) Last 24hr Input/Output: 
 
Intake/Output Summary (Last 24 hours) at 2018 8313 Last data filed at 2018 3488 Gross per 24 hour Intake 1752.67 ml Output 1595 ml Net 157.67 ml PHYSICAL EXAM: 
General:     Alert, cooperative, no distress, appears stated age. Head:    Normocephalic, without obvious abnormality, atraumatic. Eyes:    Conjunctivae/corneas clear. PERRLA Nose:   Nares normal. No drainage or sinus tenderness. Throat:     Lips, mucosa, and tongue normal.  No Thrush Neck:   Supple, symmetrical,  no adenopathy, thyroid: non tender 
   no carotid bruit and no JVD. Back:     Symmetric,  No CVA tenderness. Lungs:    Clear to auscultation bilaterally. No Wheezing or Rhonchi. No rales. Heart:    Regular rate and rhythm,  no murmur, rub or gallop. Abdomen:    Soft, non-tender. Not distended. Bowel sounds normal. No masses Extremities:  Extremities normal, atraumatic, No cyanosis. No edema. No clubbing Lymph nodes:  Cervical, supraclavicular normal. 
Neurologic:  Normal strength, Alert and oriented X 3. Skin:                No rash Lab Data Reviewed: 
 
Recent Results (from the past 24 hour(s)) GLUCOSE, POC Collection Time: 18 11:46 AM  
Result Value Ref Range Glucose (POC) 275 (H) 65 - 100 mg/dL Performed by Ranjana Aguila GLUCOSE, POC Collection Time: 11/05/18  4:54 PM  
Result Value Ref Range Glucose (POC) 239 (H) 65 - 100 mg/dL Performed by Ranjana Aguila GLUCOSE, POC Collection Time: 11/05/18  9:13 PM  
Result Value Ref Range Glucose (POC) 292 (H) 65 - 100 mg/dL Performed by Sue Lorenzo CBC WITH AUTOMATED DIFF Collection Time: 11/06/18  2:37 AM  
Result Value Ref Range WBC 10.5 4.1 - 11.1 K/uL  
 RBC 3.84 (L) 4.10 - 5.70 M/uL  
 HGB 11.6 (L) 12.1 - 17.0 g/dL HCT 35.1 (L) 36.6 - 50.3 % MCV 91.4 80.0 - 99.0 FL  
 MCH 30.2 26.0 - 34.0 PG  
 MCHC 33.0 30.0 - 36.5 g/dL  
 RDW 13.8 11.5 - 14.5 % PLATELET 915 (L) 904 - 400 K/uL MPV 11.3 8.9 - 12.9 FL  
 NRBC 0.0 0  WBC ABSOLUTE NRBC 0.00 0.00 - 0.01 K/uL NEUTROPHILS 94 (H) 32 - 75 % LYMPHOCYTES 2 (L) 12 - 49 % MONOCYTES 3 (L) 5 - 13 % EOSINOPHILS 0 0 - 7 % BASOPHILS 0 0 - 1 % IMMATURE GRANULOCYTES 1 (H) 0.0 - 0.5 % ABS. NEUTROPHILS 9.9 (H) 1.8 - 8.0 K/UL  
 ABS. LYMPHOCYTES 0.2 (L) 0.8 - 3.5 K/UL  
 ABS. MONOCYTES 0.3 0.0 - 1.0 K/UL  
 ABS. EOSINOPHILS 0.0 0.0 - 0.4 K/UL  
 ABS. BASOPHILS 0.0 0.0 - 0.1 K/UL  
 ABS. IMM. GRANS. 0.1 (H) 0.00 - 0.04 K/UL  
 DF AUTOMATED    
 RBC COMMENTS NORMOCYTIC, NORMOCHROMIC METABOLIC PANEL, BASIC Collection Time: 11/06/18  2:37 AM  
Result Value Ref Range Sodium 138 136 - 145 mmol/L Potassium 4.2 3.5 - 5.1 mmol/L Chloride 108 97 - 108 mmol/L  
 CO2 22 21 - 32 mmol/L Anion gap 8 5 - 15 mmol/L Glucose 188 (H) 65 - 100 mg/dL BUN 20 6 - 20 MG/DL Creatinine 1.08 0.70 - 1.30 MG/DL  
 BUN/Creatinine ratio 19 12 - 20 GFR est AA >60 >60 ml/min/1.73m2 GFR est non-AA >60 >60 ml/min/1.73m2 Calcium 8.0 (L) 8.5 - 10.1 MG/DL Assessment/Plan: Active Problems: 
  Pneumonia (8/10/2016) Ad[facility]al insufficiency [facility] (11/4/2018) Hyponatremia (11/4/2018) Generalized weakness (11/4/2018) ___________________________________________________ PLAN: 
 
1. Chest x-ray suggestive of pneumonia on IV cefepime and Levaquin, supplemental oxygen 2. Stress dose steroids for adrenal insufficiency 3. Hyponatremia resolved with IV fluids Lasix was held echocardiogram ordered, hold IV fluids and follow volume status 4. Atrial fibrillation converted to normal sinus rhythm on IV amiodarone to p.o. amiodarone 5. Steroid-induced diabetes mellitus, cover with sliding scale insulin while on stress dose steroids 6.  Echocardiogram with normal ejection fraction, appreciate cardiology and hematology/oncology evaluation and recommendations 
 
 
 
 
___________________________________________________ Attending Physician: Jayashree Salmon MD

## 2018-11-06 NOTE — PROGRESS NOTES
Bedside shift change report given to Billie Rodriguez (oncoming nurse) by Rob Johnson (offgoing nurse). Report included the following information SBAR, Kardex, Intake/Output, MAR, Recent Results and Cardiac Rhythm NSR, 1st degree block, BBB. 0730- Pt assisted up to recliner with 1 assist.  
 
1300- Pt back to bed. 1630- Pt up to recliner for dinner. 1730- Pt back to bed. 1915- Bedside shift change report given to Rob Johnson (oncoming nurse) by Billie Rodriguez (offgoing nurse). Report included the following information SBAR, Kardex, ED Summary, Procedure Summary, Intake/Output, MAR, Recent Results and Cardiac Rhythm NSR.

## 2018-11-06 NOTE — PROGRESS NOTES
-Hematology / Oncology ([facility]) - 
-Primary Oncologist- Dr Kandi Hickey 
-Marcel Tee better today, not on oxygen. Ambulated yesterday. SOB much better. -O-  
  
Patient Vitals for the past 24 hrs: 
 Temp Pulse Resp BP SpO2  
11/06/18 0726 97.7 °F (36.5 °C) 62 18 146/73 96 % 11/06/18 0245 97.7 °F (36.5 °C) 64 16 128/70 94 % 11/05/18 1928 97.6 °F (36.4 °C) 66 16 118/60 97 % 11/05/18 1439 96.7 °F (35.9 °C) 67 18 125/66 95 % 11/05/18 1137 97.8 °F (36.6 °C) 69 18 101/56 98 % 11/05/18 0952     99 % 11/06 0701 - 11/06 1900 In: 500 [P.O.:500] Out: -  
Gen: nad Chest: bilateral breath sounds present Cardiac: rrr Abd: s/nt 
 
-Labs-   
Recent Labs 11/06/18 
1572 11/05/18 
0357 11/04/18 
1220 11/03/18 
2201 WBC 10.5 9.4 11.7* 10.0 HGB 11.6* 12.2 13.2 16.0 * 120* 127* 170 ANEU 9.9* 8.8* 10.5* 8.3*  136 129* 128* K 4.2 4.5 4.6 4.7 * 228* 148* 120* BUN 20 19 23* 30* CREA 1.08 1.23 1.30 1.50* ALT  --   --  15 20 SGOT  --   --  15 19 TBILI  --   --  1.1* 1.2* AP  --   --  54 73 CA 8.0* 7.9* 7.8* 9.5 MG  --  1.9  --   --   
PHOS  --  2.8  --   --   
 
 
-Imaging-  
 
 
-Assessment + Plan- The patient is a very pleasant, 75-year-old gentleman with a past medical history of metastatic melanoma, adrenal insufficiency, who was admitted with pneumonia. 1.  Metastatic melanoma:   
He is followed by Dr. Kandi Hickey. He has been on maintenance Opdivo for quite some time now, tolerating therapy overall fairly well. His last PET scan 07/20 showed no evidence of disease. He will have future scans coming in with Dr. Kandi Hickey in the next month. His last dose of Opdivo was 11/01/2018. Would continue on as an outpatient. Will ensure close f/u with Dr Kandi Hickey after d/c 
 
2.   History of adrenal insufficiency, secondary to immunotherapy:  He has been on hydrocortisone at home for the last several years, since he has had adrenal insufficiency, likely related to his immunotherapy. He is on IV stress doses at this time. He continues to improve. His vital signs remain stable. consider switching him over to his home dose. 3.  Hyponatremia:  
Likely related to dehydration. Na now improved. 4.  History of deep venous thrombosis:   
He does remain on Eliquis. No evidence of bleeding. Continue to monitor. 5.  Pneumonia: 
  Antibiotics per Primary Team.  He had a negative CTA of the chest on 10/31.   He is on Levaquin and cefepime, per Primary Team.

## 2018-11-06 NOTE — PROGRESS NOTES
Patient was visited by a Spiritual Care Partner Volunteer in [facility] on 11/6/2018. Documented by Rev. Tona Rodriguez, [facility]  paging service: [phone])

## 2018-11-06 NOTE — PROGRESS NOTES
Reason for Admission:   Patient came to ed for complaint of sob, nausea, vomiting, weakness , fatigue and decrease appetite along with a fever. RRAT Score:    23 Resources/supports as identified by patient/family:   Patient has supportive wife. Top Challenges facing patient (as identified by patient/family and CM): Finances/Medication cost?  Patient denies any problems with obtaining his medications. Transportation? Patient drives. Support system or lack thereof? He has supportive wife. . 
 
                  
Living arrangements? He lives in one story home with his wife. Self-care/ADLs/Cognition? He was  Independent prior to coming to the hospital. He uses  A cane when he goes out and a walker in the home. He denies using cpap or oxygen at home. Current Advanced Directive/Advance Care Plan: On file. Plan for utilizing home health:    He has used home health services in the past however he could not remember the name of the agency. He has not used snf services. Likelihood of readmission:   Moderate. Transition of Care Plan:     Patient will follow up with his health care team when discharged. He is open to home health services if needed. [facility] Care Management Interventions PCP Verified by CM: Yes Transition of Care Consult (CM Consult): Discharge Planning Discharge Durable Medical Equipment: (Patient has cane, walker at home. ) Physical Therapy Consult: Yes Occupational Therapy Consult: Yes Current Support Network: Lives with Spouse Confirm Follow Up Transport: Family Plan discussed with Pt/Family/Caregiver: Yes Discharge Location Discharge Placement: Home

## 2018-11-06 NOTE — PROGRESS NOTES
Progress Note 
 
 
11/6/2018 8:18 AM 
NAME: Ulises Veloz MRN:  [id number] Admit Diagnosis: Pneumonia Generalized weakness Hyponatremia Adrenal insufficiency ([place]'s disease) [facility] Assessment:   
  
1. Pneumonia with Prox Afib 2. No hx of CAD 3. Severe MR s/p repair in 2005 4. Prox afib s/p CV in 2012, RBBB 5. Metastatic Melanoma Dr. Landon Heimlich 6. Back Pain 7. Cerebral aneurysm 8. Cardiologist:  Jose 
   
  
            Plan:    
  
Pneumonia on abx Prox afib back in sinus on amiodarone 
  
1. Cont eliquis 2. Change amiodarone back to PO will give 200mg qd while in hospital, would discharge back on 100mg daily 3. Holding furosemide, resume on discharge 
  
Will sign off, please call if we can be of further assistance. 
   
  
 [x]        High complexity decision making was performed 
  
 
 
 
 
Subjective:  
 
Ulises Veloz denies chest pain, dyspnea. Discussed with RN events overnight. Review of Systems: 
 
Symptom Y/N Comments  Symptom Y/N Comments Fever/Chills N   Chest Pain N Poor Appetite N   Edema N   
Cough N   Abdominal Pain N Sputum N   Joint Pain N   
SOB/JARQUIN N   Pruritis/Rash N   
Nausea/vomit N   Tolerating PT/OT Y Diarrhea N   Tolerating Diet Y Constipation N   Other Could NOT obtain due to:   
 
Objective:  
  
Physical Exam: 
 
Last 24hrs VS reviewed since prior progress note. Most recent are: 
 
Visit Vitals /73 (BP 1 Location: Left arm, BP Patient Position: At rest) Pulse 62 Temp 97.7 °F (36.5 °C) Resp 18 Ht 5' 8\" (1.727 m) Wt 81.7 kg (180 lb 1.6 oz) SpO2 96% BMI 27.38 kg/m² Intake/Output Summary (Last 24 hours) at 11/6/2018 [facility] Last data filed at 11/6/2018 8487 Gross per 24 hour Intake 1752.67 ml Output 1595 ml Net 157.67 ml General Appearance: Well developed, well nourished, alert & oriented x 3,  
 no acute distress. Ears/Nose/Mouth/Throat: Hearing grossly normal. 
Neck: Supple. Chest: Lungs clear to auscultation bilaterally. Cardiovascular: Regular rate and rhythm, S1S2 normal, no murmur. Abdomen: Soft, non-tender, bowel sounds are active. Extremities: No edema bilaterally. Skin: Warm and dry. PMH/SH reviewed - no change compared to H&P Data Review Telemetry: normal sinus rhythm Lab Data Personally Reviewed: 
 
Recent Labs 11/06/18 
6944 11/05/18 
9680 WBC 10.5 9.4 HGB 11.6* 12.2 HCT 35.1* 37.3 * 120* No results for input(s): INR, PTP, APTT in the last 72 hours. No lab exists for component: INREXT Recent Labs 11/06/18 
0237 11/05/18 
0357 11/04/18 
1220  136 129*  
K 4.2 4.5 4.6  107 97 CO2 22 22 26 BUN 20 19 23* CREA 1.08 1.23 1.30 * 228* 148* CA 8.0* 7.9* 7.8*  
MG  --  1.9  -- No results for input(s): CPK, CKNDX, TROIQ in the last 72 hours. No lab exists for component: CPKMB No results found for: CHOL, CHOLX, CHLST, CHOLV, HDL, LDL, LDLC, DLDLP, TGLX, TRIGL, TRIGP, CHHD, CHHDX Recent Labs 11/04/18 
1220 11/03/18 
2201 SGOT 15 19 AP 54 73 TP 6.0* 8.5* ALB 2.6* 3.9 GLOB 3.4 4.6* No results for input(s): PH, PCO2, PO2 in the last 72 hours. Medications Personally Reviewed: 
 
Current Facility-Administered Medications Medication Dose Route Frequency  hydrocortisone Sod Succ (PF) (SOLU-CORTEF) injection 50 mg  50 mg IntraVENous Q12H  
 amiodarone (CORDARONE) tablet 200 mg  200 mg Oral DAILY  cefepime (MAXIPIME) 2 g in 0.9% sodium chloride (MBP/ADV) 100 mL  2 g IntraVENous Q12H  
 sodium chloride (NS) flush 5-10 mL  5-10 mL IntraVENous Q8H  
 sodium chloride (NS) flush 5-10 mL  5-10 mL IntraVENous PRN  
 acetaminophen (TYLENOL) tablet 650 mg  650 mg Oral Q6H PRN  
 ondansetron (ZOFRAN) injection 4 mg  4 mg IntraVENous Q6H PRN  
 insulin lispro (HUMALOG) injection   SubCUTAneous AC&HS  
 glucose chewable tablet 16 g  4 Tab Oral PRN  
  dextrose (D50W) injection syrg 12.5-25 g  12.5-25 g IntraVENous PRN  
 glucagon (GLUCAGEN) injection 1 mg  1 mg IntraMUSCular PRN  
 apixaban (ELIQUIS) tablet 5 mg  5 mg Oral BID  latanoprost (XALATAN) 0.005 % ophthalmic solution 1 Drop  1 Drop Both Eyes QHS  cholecalciferol (VITAMIN D3) tablet 1,000 Units  1,000 Units Oral DAILY  diclofenac (VOLTAREN) 1 % topical gel 2 g  2 g Topical QID PRN  
 levoFLOXacin (LEVAQUIN) 750 mg in D5W IVPB  750 mg IntraVENous Q48H  
 nateglinide (STARLIX) 120 mg tablet 60 mg  60 mg Oral TID WITH MEALS Travis Johnson MD

## 2018-11-06 NOTE — DIABETES MGMT
DTC Progress Note Recommendations/ Comments: Pt with BG > 180 mg/dL. Noted solu-cortef decreased today to 50 mg q 12 hours. Please consider beginning NPH 5 units Q12hrs linked and timed with solu-cortef. Current hospital DM medication: humalog correction, starlix 60mg ac tid Chart reviewed on Aiden Molina. Patient is a 80 y.o. male with known Type 2 Diabetes on starlix 60 mg daily at home. A1c:  
Lab Results Component Value Date/Time Hemoglobin A1c, External 6.9 06/07/2017 Recent Glucose Results:  
Lab Results Component Value Date/Time  (H) 11/06/2018 02:37 AM  
 GLUCPOC 281 (H) 11/06/2018 11:45 AM  
 GLUCPOC 155 (H) 11/06/2018 07:36 AM  
 GLUCPOC 292 (H) 11/05/2018 09:13 PM  
  
 
Lab Results Component Value Date/Time Creatinine 1.08 11/06/2018 02:37 AM  
 
Estimated Creatinine Clearance: 47.5 mL/min (based on SCr of 1.08 mg/dL). Active Orders Diet DIET CARDIAC Regular; Consistent Carb 2000kcal  
  
 
PO intake:  
Patient Vitals for the past 72 hrs: 
 % Diet Eaten 11/06/18 0858 100 % 11/05/18 1756 100 % 11/05/18 1250 100 % 11/05/18 0926 100 % 11/04/18 1850 90 % 11/04/18 1529 10 % Will continue to follow as needed. Thank you Tiffany Walker [facility]

## 2018-11-06 NOTE — PROGRESS NOTES
PCU SHIFT NURSING NOTE 
 
1915: Bedside shift change report given to Umang Moses RN (oncoming nurse) by Theresa Aguiar RN (offgoing nurse). Report included the following information SBAR, Kardex, Intake/Output, MAR, Recent Results and Cardiac Rhythm NSR c 1 degree AVB, BBB. Shift Summary:  
1915: Pt resting in bed, no distress. 2115: Pt voided 225mL in urinal approx 30 minutes ago. Bladder scanner shows 305mL. Pt states he feels he will be able to void again shortly. 2145: Pt voided 325mL into urinal.  
0700: Bedside shift change report given to Theresa Aguiar RN (oncoming nurse) by Umang Moses RN (offgoing nurse). Report included the following information SBAR, Kardex, Intake/Output, MAR, Recent Results and Cardiac Rhythm NSR BBB. Admission Date 11/4/2018 Admission Diagnosis Pneumonia Generalized weakness Hyponatremia Adrenal insufficiency ([place]'s disease) ([facility] Consults IP CONSULT TO HEMATOLOGY 
IP CONSULT TO CARDIOLOGY Consults []PT []OT []Speech  
[]Case Management  
  
[] Palliative Cardiac Monitoring Order []Yes []No  
 
IV drips []Yes Drip:                            Dose: 
Drip:                            Dose: 
Drip:                            Dose:  
[]No  
 
GI Prophylaxis []Yes []No  
 
 
 
DVT Prophylaxis SCDs:     
     
 Grayson stockings:     
  
[] Medication []Contraindicated []None Activity Level Activity Level: Up with Assistance Activity Assistance: Partial (one person) Purposeful Rounding every 1-2 hour? []Yes Solis Score  Total Score: 4 Bed Alarm (If score 3 or >) []Yes  
[] Refused (See signed refusal form in chart) Horacio Score  Horacio Score: 19 Horacio Score (if score 14 or less) []PMT consult  
[]Wound Care consult []Specialty bed  
[] Nutrition consult Needs prior to discharge:  
Home O2 required:   
[]Yes []No  
 If yes, how much O2 required? Other: Last Bowel Movement: Last Bowel Movement Date: (patient states he is unsure) Influenza Vaccine Received Flu Vaccine for Current Season (usually Sept-March): No  
 Patient/Guardian Refused (Notify MD): Yes Pneumonia Vaccine Diet Active Orders Diet DIET CARDIAC Regular; Consistent Carb 2000kcal  
  
LDAs Venous Access Device power port venous access device 11/04/18 Upper chest (subclavicular area), left (Active) Central Line Being Utilized Yes 11/5/2018  2:39 PM  
Criteria for Appropriate Use Limited/no vessel suitable for conventional peripheral access 11/5/2018  2:39 PM  
Site Assessment Clean, dry, & intact 11/5/2018  2:39 PM  
Date of Last Dressing Change 11/04/18 11/5/2018  2:39 PM  
Dressing Status Clean, dry, & intact 11/5/2018  2:39 PM  
Dressing Type Transparent 11/5/2018  2:39 PM  
Positive Blood Return (Medial Site) Yes 11/5/2018  3:00 AM  
  
Peripheral IV 11/04/18 Anterior; Inferior; Lower;Proximal;Right Arm (Active) Site Assessment Clean, dry, & intact 11/5/2018  2:39 PM  
Phlebitis Assessment 0 11/5/2018  2:39 PM  
Infiltration Assessment 0 11/5/2018  2:39 PM  
Dressing Status Clean, dry, & intact 11/5/2018  2:39 PM  
Dressing Type Tape;Transparent 11/5/2018  2:39 PM  
Hub Color/Line Status Blue;Flushed 11/5/2018  2:39 PM  
                  
Urinary Catheter Intake & Output Date 11/04/18 1900 - 11/05/18 0659 11/05/18 0700 - 11/06/18 9754 Shift 6501-0732 24 Hour Total 6099-0274 5067-3572 24 Hour Total  
INTAKE  
P.O.  500 1150  1150  
  P. O.  500 1150  1150  
I. V.(mL/kg/hr) 658. 3 1880 160(0.2)  160 Amiodarone Volume 658. 3 658. 3 160  160 Volume (sodium chloride 0.9 % bolus infusion 1,000 mL)  1000 Volume (0.9% sodium chloride infusion)  221.7 Shift Total(mL/kg) 658. 3(8.1) 8020(47.5) 1310(16)  1310(16)  
OUTPUT Urine(mL/kg/hr) 1000 1950 320(0.3)  320 Urine  700 Urine Voided 1000 1250 320  320 Urine Occurrence(s)   1 x  1 x Stool Stool Occurrence(s)   1 x  1 x Shift Total(mL/kg) 4277(29.5) 1950(23.9) 320(3.9)  320(3.9) NET -341.7 430 990  990 Weight (kg) 81.6 81.6 81.7 81.7 81.7 Readmission Risk Assessment Tool Score High Risk   
      
 23 Total Score 3 Has Seen PCP in Last 6 Months (Yes=3, No=0)  
 5 Pt. Coverage (Medicare=5 , Medicaid, or Self-Pay=4) 15 Charlson Comorbidity Score (Age + Comorbid Conditions) Criteria that do not apply:  
 . Living with Significant Other. Assisted Living. LTAC. SNF. or  
Rehab Patient Length of Stay (>5 days = 3) IP Visits Last 12 Months (1-3=4, 4=9, >4=11) Expected Length of Stay 4d 4h Actual Length of Stay 1

## 2018-11-07 VITALS
SYSTOLIC BLOOD PRESSURE: 115 MMHG | HEIGHT: 68 IN | BODY MASS INDEX: 26.4 KG/M2 | OXYGEN SATURATION: 98 % | TEMPERATURE: 98 F | WEIGHT: 174.16 LBS | DIASTOLIC BLOOD PRESSURE: 61 MMHG | HEART RATE: 76 BPM | RESPIRATION RATE: 16 BRPM

## 2018-11-07 LAB
ANION GAP SERPL CALC-SCNC: 9 MMOL/L (ref 5–15)
BASOPHILS # BLD: 0 K/UL (ref 0–0.1)
BASOPHILS NFR BLD: 0 % (ref 0–1)
BUN SERPL-MCNC: 20 MG/DL (ref 6–20)
BUN/CREAT SERPL: 17 (ref 12–20)
CALCIUM SERPL-MCNC: 7.9 MG/DL (ref 8.5–10.1)
CHLORIDE SERPL-SCNC: 107 MMOL/L (ref 97–108)
CO2 SERPL-SCNC: 24 MMOL/L (ref 21–32)
CREAT SERPL-MCNC: 1.17 MG/DL (ref 0.7–1.3)
DIFFERENTIAL METHOD BLD: ABNORMAL
EOSINOPHIL # BLD: 0 K/UL (ref 0–0.4)
EOSINOPHIL NFR BLD: 0 % (ref 0–7)
ERYTHROCYTE [DISTWIDTH] IN BLOOD BY AUTOMATED COUNT: 13.9 % (ref 11.5–14.5)
GLUCOSE BLD STRIP.AUTO-MCNC: 129 MG/DL (ref 65–100)
GLUCOSE BLD STRIP.AUTO-MCNC: 197 MG/DL (ref 65–100)
GLUCOSE SERPL-MCNC: 179 MG/DL (ref 65–100)
HCT VFR BLD AUTO: 36.1 % (ref 36.6–50.3)
HGB BLD-MCNC: 11.7 G/DL (ref 12.1–17)
IMM GRANULOCYTES # BLD: 0.1 K/UL (ref 0–0.04)
IMM GRANULOCYTES NFR BLD AUTO: 1 % (ref 0–0.5)
LYMPHOCYTES # BLD: 0.4 K/UL (ref 0.8–3.5)
LYMPHOCYTES NFR BLD: 4 % (ref 12–49)
MCH RBC QN AUTO: 29.4 PG (ref 26–34)
MCHC RBC AUTO-ENTMCNC: 32.4 G/DL (ref 30–36.5)
MCV RBC AUTO: 90.7 FL (ref 80–99)
MONOCYTES # BLD: 0.3 K/UL (ref 0–1)
MONOCYTES NFR BLD: 3 % (ref 5–13)
NEUTS SEG # BLD: 8.2 K/UL (ref 1.8–8)
NEUTS SEG NFR BLD: 92 % (ref 32–75)
NRBC # BLD: 0 K/UL (ref 0–0.01)
NRBC BLD-RTO: 0 PER 100 WBC
PLATELET # BLD AUTO: 157 K/UL (ref 150–400)
PMV BLD AUTO: 11.3 FL (ref 8.9–12.9)
POTASSIUM SERPL-SCNC: 4.4 MMOL/L (ref 3.5–5.1)
RBC # BLD AUTO: 3.98 M/UL (ref 4.1–5.7)
RBC MORPH BLD: ABNORMAL
SERVICE CMNT-IMP: ABNORMAL
SERVICE CMNT-IMP: ABNORMAL
SODIUM SERPL-SCNC: 140 MMOL/L (ref 136–145)
WBC # BLD AUTO: 9 K/UL (ref 4.1–11.1)

## 2018-11-07 PROCEDURE — 74011250636 HC RX REV CODE- 250/636: Performed by: HOSPITALIST

## 2018-11-07 PROCEDURE — 74011250636 HC RX REV CODE- 250/636: Performed by: INTERNAL MEDICINE

## 2018-11-07 PROCEDURE — 36415 COLL VENOUS BLD VENIPUNCTURE: CPT | Performed by: INTERNAL MEDICINE

## 2018-11-07 PROCEDURE — 74011250637 HC RX REV CODE- 250/637: Performed by: INTERNAL MEDICINE

## 2018-11-07 PROCEDURE — 85025 COMPLETE CBC W/AUTO DIFF WBC: CPT | Performed by: INTERNAL MEDICINE

## 2018-11-07 PROCEDURE — 74011000258 HC RX REV CODE- 258: Performed by: HOSPITALIST

## 2018-11-07 PROCEDURE — 82962 GLUCOSE BLOOD TEST: CPT

## 2018-11-07 PROCEDURE — 80048 BASIC METABOLIC PNL TOTAL CA: CPT | Performed by: INTERNAL MEDICINE

## 2018-11-07 PROCEDURE — 74011250637 HC RX REV CODE- 250/637: Performed by: HOSPITALIST

## 2018-11-07 RX ORDER — HEPARIN 100 UNIT/ML
300 SYRINGE INTRAVENOUS AS NEEDED
Status: DISCONTINUED | OUTPATIENT
Start: 2018-11-07 | End: 2018-11-07 | Stop reason: HOSPADM

## 2018-11-07 RX ADMIN — AMIODARONE HYDROCHLORIDE 200 MG: 200 TABLET ORAL at 08:45

## 2018-11-07 RX ADMIN — HYDROCORTISONE SODIUM SUCCINATE 50 MG: 100 INJECTION, POWDER, FOR SOLUTION INTRAMUSCULAR; INTRAVENOUS at 08:45

## 2018-11-07 RX ADMIN — CEFEPIME HYDROCHLORIDE 2 G: 2 INJECTION, POWDER, FOR SOLUTION INTRAVENOUS at 05:58

## 2018-11-07 RX ADMIN — NATEGLINIDE 60 MG: 120 TABLET, FILM COATED ORAL at 08:44

## 2018-11-07 RX ADMIN — VITAMIN D, TAB 1000IU (100/BT) 1000 UNITS: 25 TAB at 08:45

## 2018-11-07 RX ADMIN — APIXABAN 5 MG: 5 TABLET, FILM COATED ORAL at 08:45

## 2018-11-07 RX ADMIN — SODIUM CHLORIDE, PRESERVATIVE FREE 300 UNITS: 5 INJECTION INTRAVENOUS at 11:54

## 2018-11-07 RX ADMIN — NATEGLINIDE 60 MG: 120 TABLET, FILM COATED ORAL at 11:54

## 2018-11-07 RX ADMIN — Medication 10 ML: at 05:59

## 2018-11-07 RX ADMIN — Medication 10 ML: at 08:46

## 2018-11-07 NOTE — DISCHARGE INSTRUCTIONS
Doctor Longo [address]  [phone]      Patient Discharge Instructions    [facility] [id number] : 1931    Admitted 2018 Discharged: 18     Take Home Medications            · It is important that you take the medication exactly as they are prescribed. · Keep your medication in the bottles provided by the pharmacist and keep a list of the medication names, dosages, and times to be taken in your wallet. · Do not take other medications without consulting your doctor. What to do at Home    Recommended diet: Cardiac Diet,     Recommended activity: Activity as tolerated,       Follow-up with Dr. Gia Natarajan in 2 day2. Call [phone] for your appointment. Information obtained by :  I understand that if any problems occur once I am at home I am to contact my physician. I understand and acknowledge receipt of the instructions indicated above. Physician's or R.N.'s Signature                                                                  Date/Time                                                                                                                                              Patient or Representative Signature                                                          Date/Time    The [facility] Activation    Thank you for requesting access to [facility]. Please follow the instructions below to securely access and download your online medical record. The [facility] allows you to send messages to your doctor, view your test results, renew your prescriptions, schedule appointments, and more. How Do I Sign Up? 1. In your internet browser, go to [email or web address]  2. Click on the First Time User? Click Here link in the Sign In box.  You will be redirect to the New Member Sign Up page.  3. Enter your [employer] Access Code exactly as it appears below. You will not need to use this code after youve completed the sign-up process. If you do not sign up before the expiration date, you must request a new code. [employer] Access Code: [email or web address]  Expires: 2019  9:00 PM (This is the date your [employer] access code will )    4. Enter the last four digits of your Social Security Number (xxxx) and Date of Birth (mm/dd/yyyy) as indicated and click Submit. You will be taken to the next sign-up page. 5. Create a jiffstoret ID. This will be your [employer] login ID and cannot be changed, so think of one that is secure and easy to remember. 6. Create a [employer] password. You can change your password at any time. 7. Enter your Password Reset Question and Answer. This can be used at a later time if you forget your password. 8. Enter your e-mail address. You will receive e-mail notification when new information is available in [address] 9. Click Sign Up. You can now view and download portions of your medical record. 10. Click the Download Summary menu link to download a portable copy of your medical information. Additional Information    If you have questions, please visit the Frequently Asked Questions section of the [employer] website at [email or web address] Remember, [employer] is NOT to be used for urgent needs. For medical emergencies, dial 911.

## 2018-11-07 NOTE — PROGRESS NOTES
PROGRESS NOTE 
 
NAME:  Gideon Riedel :   1931 MRN:   [id number] Date/Time:  2018 7:26 AM 
Subjective:  
History:   Mr. Argelia Perez was admitted with profound weakness and changes on his chest x-ray suggesting left lower lobe and left middle lobe pneumonia. He had some hyponatremia on admission which has corrected with IV fluids. He denies fever chills cough pleuritic chest pain or shortness of breath. He has a history of atrial fibrillation and went into atrial fibrillation after admission was placed on IV amiodarone. He was started on IV cefepime and Levaquin. His last dose of Opdivo was on . He is now on stress dose steroids and his blood glucose is elevated. He also had an elevated BNP on evaluation in the ER. He has no overt signs of heart failure with no PND, orthopnea, or pedal edema. He is improved this morning with no shortness of breath and sats of 96% on room air. Medications reviewed: 
Current Facility-Administered Medications Medication Dose Route Frequency  hydrocortisone Sod Succ (PF) (SOLU-CORTEF) injection 50 mg  50 mg IntraVENous Q12H  
 amiodarone (CORDARONE) tablet 200 mg  200 mg Oral DAILY  cefepime (MAXIPIME) 2 g in 0.9% sodium chloride (MBP/ADV) 100 mL  2 g IntraVENous Q12H  
 sodium chloride (NS) flush 5-10 mL  5-10 mL IntraVENous Q8H  
 sodium chloride (NS) flush 5-10 mL  5-10 mL IntraVENous PRN  
 acetaminophen (TYLENOL) tablet 650 mg  650 mg Oral Q6H PRN  
 ondansetron (ZOFRAN) injection 4 mg  4 mg IntraVENous Q6H PRN  
 insulin lispro (HUMALOG) injection   SubCUTAneous AC&HS  
 glucose chewable tablet 16 g  4 Tab Oral PRN  
 dextrose (D50W) injection syrg 12.5-25 g  12.5-25 g IntraVENous PRN  
 glucagon (GLUCAGEN) injection 1 mg  1 mg IntraMUSCular PRN  
 apixaban (ELIQUIS) tablet 5 mg  5 mg Oral BID  latanoprost (XALATAN) 0.005 % ophthalmic solution 1 Drop  1 Drop Both Eyes QHS  cholecalciferol (VITAMIN D3) tablet 1,000 Units  1,000 Units Oral DAILY  diclofenac (VOLTAREN) 1 % topical gel 2 g  2 g Topical QID PRN  
 levoFLOXacin (LEVAQUIN) 750 mg in D5W IVPB  750 mg IntraVENous Q48H  
 nateglinide (STARLIX) 120 mg tablet 60 mg  60 mg Oral TID WITH MEALS Objective:  
Vitals: 
Visit Vitals /70 (BP 1 Location: Left arm, BP Patient Position: At rest) Pulse 63 Temp 97.4 °F (36.3 °C) Resp 16 Ht 5' 8\" (1.727 m) Wt 174 lb 2.6 oz (79 kg) SpO2 98% BMI 26.48 kg/m² O2 Flow Rate (L/min): 2 l/min O2 Device: Room air Temp (24hrs), Av.7 °F (36.5 °C), Min:97.4 °F (36.3 °C), Max:98.4 °F (36.9 °C) Last 24hr Input/Output: 
 
Intake/Output Summary (Last 24 hours) at 2018 0737 Last data filed at 2018 5739 Gross per 24 hour Intake 1200 ml Output 1150 ml Net 50 ml PHYSICAL EXAM: 
General:     Alert, cooperative, no distress, appears stated age. Head:    Normocephalic, without obvious abnormality, atraumatic. Eyes:    Conjunctivae/corneas clear. PERRLA Nose:   Nares normal. No drainage or sinus tenderness. Throat:     Lips, mucosa, and tongue normal.  No Thrush Neck:   Supple, symmetrical,  no adenopathy, thyroid: non tender 
   no carotid bruit and no JVD. Back:     Symmetric,  No CVA tenderness. Lungs:    Clear to auscultation bilaterally. No Wheezing or Rhonchi. No rales. Heart:    Regular rate and rhythm,  no murmur, rub or gallop. Abdomen:    Soft, non-tender. Not distended. Bowel sounds normal. No masses Extremities:  Extremities normal, atraumatic, No cyanosis. No edema. No clubbing Lymph nodes:  Cervical, supraclavicular normal. 
Neurologic:  Normal strength, Alert and oriented X 3. Skin:                No rash Lab Data Reviewed: 
 
Recent Results (from the past 24 hour(s)) GLUCOSE, POC Collection Time: 18 11:45 AM  
Result Value Ref Range Glucose (POC) 281 (H) 65 - 100 mg/dL Performed by Isabelle Tang GLUCOSE, POC Collection Time: 11/06/18  4:22 PM  
Result Value Ref Range Glucose (POC) 202 (H) 65 - 100 mg/dL Performed by Hannah GLUCOSE, POC Collection Time: 11/06/18  9:08 PM  
Result Value Ref Range Glucose (POC) 149 (H) 65 - 100 mg/dL Performed by Bhaskar Nunez CBC WITH AUTOMATED DIFF Collection Time: 11/07/18  3:39 AM  
Result Value Ref Range WBC 9.0 4.1 - 11.1 K/uL  
 RBC 3.98 (L) 4.10 - 5.70 M/uL  
 HGB 11.7 (L) 12.1 - 17.0 g/dL HCT 36.1 (L) 36.6 - 50.3 % MCV 90.7 80.0 - 99.0 FL  
 MCH 29.4 26.0 - 34.0 PG  
 MCHC 32.4 30.0 - 36.5 g/dL  
 RDW 13.9 11.5 - 14.5 % PLATELET 167 667 - 496 K/uL MPV 11.3 8.9 - 12.9 FL  
 NRBC 0.0 0  WBC ABSOLUTE NRBC 0.00 0.00 - 0.01 K/uL NEUTROPHILS 92 (H) 32 - 75 % LYMPHOCYTES 4 (L) 12 - 49 % MONOCYTES 3 (L) 5 - 13 % EOSINOPHILS 0 0 - 7 % BASOPHILS 0 0 - 1 % IMMATURE GRANULOCYTES 1 (H) 0.0 - 0.5 % ABS. NEUTROPHILS 8.2 (H) 1.8 - 8.0 K/UL  
 ABS. LYMPHOCYTES 0.4 (L) 0.8 - 3.5 K/UL  
 ABS. MONOCYTES 0.3 0.0 - 1.0 K/UL  
 ABS. EOSINOPHILS 0.0 0.0 - 0.4 K/UL  
 ABS. BASOPHILS 0.0 0.0 - 0.1 K/UL  
 ABS. IMM. GRANS. 0.1 (H) 0.00 - 0.04 K/UL  
 DF AUTOMATED    
 RBC COMMENTS NORMOCYTIC, NORMOCHROMIC METABOLIC PANEL, BASIC Collection Time: 11/07/18  3:39 AM  
Result Value Ref Range Sodium 140 136 - 145 mmol/L Potassium 4.4 3.5 - 5.1 mmol/L Chloride 107 97 - 108 mmol/L  
 CO2 24 21 - 32 mmol/L Anion gap 9 5 - 15 mmol/L Glucose 179 (H) 65 - 100 mg/dL BUN 20 6 - 20 MG/DL Creatinine 1.17 0.70 - 1.30 MG/DL  
 BUN/Creatinine ratio 17 12 - 20 GFR est AA >60 >60 ml/min/1.73m2 GFR est non-AA 59 (L) >60 ml/min/1.73m2 Calcium 7.9 (L) 8.5 - 10.1 MG/DL Assessment/Plan: Active Problems: 
  Pneumonia (8/10/2016) Adrenal insufficiency (Jose's disease) ([facility] [address] (11/4/2018) Hyponatremia (11/4/2018) Generalized weakness (11/4/2018) ___________________________________________________ PLAN: 
 
1. Chest x-ray suggestive of pneumonia on IV cefepime and Levaquin, supplemental oxygen weaned off 2. Stress dose steroids for adrenal insufficiency, resume usual dose 3. Hyponatremia resolved with IV fluids Lasix was held echocardiogram ordered, hold IV fluids and follow volume status 4. Atrial fibrillation converted to normal sinus rhythm on IV amiodarone to p.o. amiodarone 5. Steroid-induced diabetes mellitus, cover with sliding scale insulin while on stress dose steroids 6.  Echocardiogram with normal ejection fraction, appreciate cardiology and hematology/oncology evaluation and recommendations 7. Home today on levaquin, follow up office 48 hours 
 
 
 
 
___________________________________________________ Attending Physician: Jeremy Romo MD

## 2018-11-07 NOTE — PROGRESS NOTES
-Hematology / Oncology ([facility]) - 
-Primary Oncologist- Dr Nicolette Stovall 
-Eloy Hilarioie better today and happy to be going home. -O-  
  
Patient Vitals for the past 24 hrs: 
 Temp Pulse Resp BP SpO2  
11/07/18 0732 97.4 °F (36.3 °C) 63 16 141/70 98 % 11/07/18 0339 98.4 °F (36.9 °C) 71 16 133/75 94 % 11/06/18 2240 97.6 °F (36.4 °C) 69 18 136/71 96 % 11/06/18 1913 97.9 °F (36.6 °C) 69 18 121/65 97 % 11/06/18 1435 97.4 °F (36.3 °C) 72 18 131/61 95 % 11/06/18 1119 97.4 °F (36.3 °C) 73 18 106/61 95 % No intake/output data recorded. Gen: nad Chest: bilateral breath sounds present Cardiac: rrr Abd: s/nt 
 
-Labs-   
Recent Labs 11/07/18 
2319 11/06/18 
0237 11/05/18 
0357 11/04/18 
1220 WBC 9.0 10.5 9.4 11.7* HGB 11.7* 11.6* 12.2 13.2  143* 120* 127* ANEU 8.2* 9.9* 8.8* 10.5*  138 136 129*  
K 4.4 4.2 4.5 4.6 * 188* 228* 148* BUN 20 20 19 23* CREA 1.17 1.08 1.23 1.30 ALT  --   --   --  15 SGOT  --   --   --  15  
TBILI  --   --   --  1.1* AP  --   --   --  54  
CA 7.9* 8.0* 7.9* 7.8*  
MG  --   --  1.9  --   
PHOS  --   --  2.8  --   
 
 
-Imaging-  
 
 
-Assessment + Plan- The patient is a very pleasant, 49-year-old gentleman with a past medical history of metastatic melanoma, adrenal insufficiency, who was admitted with pneumonia. 1.  Metastatic melanoma:   
He is followed by Dr. Nicolette Stovall. He has been on maintenance Opdivo for quite some time now, tolerating therapy overall fairly well. His last PET scan 07/20 showed no evidence of disease. He will have future scans coming in with Dr. Nicolette Stovall in the next month. His last dose of Opdivo was 11/01/2018. Would continue on as an outpatient. Will ensure close f/u with Dr Nicolette Stovall after d/c, office will call with appt. in next 1-2 weeks.  
 
2.  History of adrenal insufficiency, secondary to immunotherapy:  He has been on hydrocortisone at home for the last several years, since he has had adrenal insufficiency, likely related to his immunotherapy. He was on IV stress doses during admission. switching him to home dose. 3.  Hyponatremia:  
Likely related to dehydration. Na now improved. 4.  History of deep venous thrombosis:   
He does remain on Eliquis. No evidence of bleeding. Continue to monitor.    
 
5.  Pneumonia: 
  Antibiotics per Primary Team.  He had a negative CTA of the chest on 10/31.

## 2018-11-07 NOTE — ACP (ADVANCE CARE PLANNING)
Transitional Care HUG Note Patient: Abner Fuller MRN: [id number]  SSN: [id number] YOB: 1931  Age: 80 y.o. Sex: male Admit Date: 11/4/2018 LOS: 3 days Subjective:  
 
81 yo male admitted for PNA who is scheduled for discharge today to home is being revaluated for ACP. Ru Orlando, initiated conversation but he wanted to wait till his wife was present for conversation. Priscilla Boswell is present today for this visit. There is an old AD on file, this will replace the old AMD today. Patient at this time is competent for ACP purposes. No complaints today. SOB resolved, afebrile. Persons included in Conversation:  patient and POA Length of ACP Conversation in minutes:  30 minutes Authorized Decision Maker (if patient is incapable of making informed decisions): This person is: 
Wife, Muna Macias General ACP for ALL Patients with Decision Making Capacity: 
 Understanding of the healthcare agent role was assessed and information provided Exploration of values, goals, and preferences if recovery is not expected, even with continued medical treatment in the event of: Imminent death Review of Existing Advance Directive: 
 
Completed a anew AMD 
 
For Serious or Chronic Illness: 
Understanding of medical condition Understanding of CPR, goals and expected outcomes, benefits and burdens discussed. Interventions Provided: 
Completed new Advance Directive ROS:  
 
A comprehensive ROS was performed and was negative except for as per HPI. Objective:  
 
Current Facility-Administered Medications Medication Dose Route Frequency  heparin (porcine) pf 300 Units  300 Units InterCATHeter PRN  
 hydrocortisone Sod Succ (PF) (SOLU-CORTEF) injection 50 mg  50 mg IntraVENous Q12H  
 amiodarone (CORDARONE) tablet 200 mg  200 mg Oral DAILY  cefepime (MAXIPIME) 2 g in 0.9% sodium chloride (MBP/ADV) 100 mL  2 g IntraVENous Q12H  sodium chloride (NS) flush 5-10 mL  5-10 mL IntraVENous Q8H  
 sodium chloride (NS) flush 5-10 mL  5-10 mL IntraVENous PRN  
 acetaminophen (TYLENOL) tablet 650 mg  650 mg Oral Q6H PRN  
 ondansetron (ZOFRAN) injection 4 mg  4 mg IntraVENous Q6H PRN  
 insulin lispro (HUMALOG) injection   SubCUTAneous AC&HS  
 glucose chewable tablet 16 g  4 Tab Oral PRN  
 dextrose (D50W) injection syrg 12.5-25 g  12.5-25 g IntraVENous PRN  
 glucagon (GLUCAGEN) injection 1 mg  1 mg IntraMUSCular PRN  
 apixaban (ELIQUIS) tablet 5 mg  5 mg Oral BID  latanoprost (XALATAN) 0.005 % ophthalmic solution 1 Drop  1 Drop Both Eyes QHS  cholecalciferol (VITAMIN D3) tablet 1,000 Units  1,000 Units Oral DAILY  diclofenac (VOLTAREN) 1 % topical gel 2 g  2 g Topical QID PRN  
 levoFLOXacin (LEVAQUIN) 750 mg in D5W IVPB  750 mg IntraVENous Q48H  
 nateglinide (STARLIX) 120 mg tablet 60 mg  60 mg Oral TID WITH MEALS Patient Vitals for the past 24 hrs: 
 Temp Pulse Resp BP SpO2  
11/07/18 1058  76  115/61 98 % 11/07/18 0732 97.4 °F (36.3 °C) 63 16 141/70 98 % 11/07/18 0339 98.4 °F (36.9 °C) 71 16 133/75 94 % 11/06/18 2240 97.6 °F (36.4 °C) 69 18 136/71 96 % 11/06/18 1913 97.9 °F (36.6 °C) 69 18 121/65 97 % 11/06/18 1435 97.4 °F (36.3 °C) 72 18 131/61 95 % Intake and Output: 
 
Intake/Output Summary (Last 24 hours) at 11/7/2018 1128 Last data filed at 11/7/2018 0125 Gross per 24 hour Intake 700 ml Output 1150 ml Net -450 ml Physical Exam: 
 
Conversant and pleasant Alert and oriented  X 3 Moves all extremities equally No facial droop No SOB/dyspnea, no oxygen supplementation RRR Lab/Data Review:  
Recent Results (from the past 24 hour(s)) GLUCOSE, POC Collection Time: 11/06/18 11:45 AM  
Result Value Ref Range Glucose (POC) 281 (H) 65 - 100 mg/dL Performed by Fe Jenkins GLUCOSE, POC Collection Time: 11/06/18  4:22 PM  
Result Value Ref Range Glucose (POC) 202 (H) 65 - 100 mg/dL Performed by Hannah GLUCOSE, POC Collection Time: 11/06/18  9:08 PM  
Result Value Ref Range Glucose (POC) 149 (H) 65 - 100 mg/dL Performed by Latasha Maldonado CBC WITH AUTOMATED DIFF Collection Time: 11/07/18  3:39 AM  
Result Value Ref Range WBC 9.0 4.1 - 11.1 K/uL  
 RBC 3.98 (L) 4.10 - 5.70 M/uL  
 HGB 11.7 (L) 12.1 - 17.0 g/dL HCT 36.1 (L) 36.6 - 50.3 % MCV 90.7 80.0 - 99.0 FL  
 MCH 29.4 26.0 - 34.0 PG  
 MCHC 32.4 30.0 - 36.5 g/dL  
 RDW 13.9 11.5 - 14.5 % PLATELET 325 654 - 534 K/uL MPV 11.3 8.9 - 12.9 FL  
 NRBC 0.0 0  WBC ABSOLUTE NRBC 0.00 0.00 - 0.01 K/uL NEUTROPHILS 92 (H) 32 - 75 % LYMPHOCYTES 4 (L) 12 - 49 % MONOCYTES 3 (L) 5 - 13 % EOSINOPHILS 0 0 - 7 % BASOPHILS 0 0 - 1 % IMMATURE GRANULOCYTES 1 (H) 0.0 - 0.5 % ABS. NEUTROPHILS 8.2 (H) 1.8 - 8.0 K/UL  
 ABS. LYMPHOCYTES 0.4 (L) 0.8 - 3.5 K/UL  
 ABS. MONOCYTES 0.3 0.0 - 1.0 K/UL  
 ABS. EOSINOPHILS 0.0 0.0 - 0.4 K/UL  
 ABS. BASOPHILS 0.0 0.0 - 0.1 K/UL  
 ABS. IMM. GRANS. 0.1 (H) 0.00 - 0.04 K/UL  
 DF AUTOMATED    
 RBC COMMENTS NORMOCYTIC, NORMOCHROMIC METABOLIC PANEL, BASIC Collection Time: 11/07/18  3:39 AM  
Result Value Ref Range Sodium 140 136 - 145 mmol/L Potassium 4.4 3.5 - 5.1 mmol/L Chloride 107 97 - 108 mmol/L  
 CO2 24 21 - 32 mmol/L Anion gap 9 5 - 15 mmol/L Glucose 179 (H) 65 - 100 mg/dL BUN 20 6 - 20 MG/DL Creatinine 1.17 0.70 - 1.30 MG/DL  
 BUN/Creatinine ratio 17 12 - 20 GFR est AA >60 >60 ml/min/1.73m2 GFR est non-AA 59 (L) >60 ml/min/1.73m2 Calcium 7.9 (L) 8.5 - 10.1 MG/DL  
GLUCOSE, POC Collection Time: 11/07/18  8:20 AM  
Result Value Ref Range Glucose (POC) 129 (H) 65 - 100 mg/dL Performed by Milla Rogers (CON) Imaging Reviewed:  
 
No results found. Assessment:  
 
Active Problems: 
  Pneumonia (8/10/2016) Adrenal insufficiency ([place]'s disease) ([facility] [address] (11/4/2018) Hyponatremia (11/4/2018) Generalized weakness (11/4/2018) Plan:  
 
The objective of todays visit was to review the transitional care plan with the patient. We discussed the importance of transitional care as it relates to reducing the likelihood of readmission. We reviewed the goals for the first 30 days following hospital discharge. The patient and I discussed wrap around services including nurse navigation, care coordination, home health, transitional care appointments with their primary care provider and specialist team and the role of dispatch health. Patient education focused on readmission zones as described as The Red Zone: High risk for readmission, days 1-21 The Yellow Zone: Moderate risk for readmission, days 22-29 The Green Zone: Lower risk for readmission, days 30 and after 1. Extensive conversation on ACP, POST and AD completed today with all copies distributed to to patient, PCP, mPOA,  and EMR. Pt informed to keep all copies in a safe, easily accessible location. Updated on portability and the necessity of possible limitations of documents if she travels out of state. Requested she give copies to all mPOAs. Patient  given opportunity to ask  questions and information given during this assessment. My contact information given to patient if any concerns or questions. 2. 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 Patient education discussed regarding PNA and signs and sx of worsening condition. HH to follow patient. The patient expressed understanding of the disease process and management plan, and all questions were answered.  Greater than 30 minutes were spent in patient management, greater than half of which was spent in counseling and coordination of care. 
   
Signed By: Cindy Narvaez NP   
 November 7, 2018

## 2018-11-07 NOTE — DISCHARGE SUMMARY
Physician Discharge Summary Patient ID: Dc Oliveros [id number] 
74 y.o. 
12/13/1931 Admit date: 11/4/2018 Discharge date and time:  11/7/18  7:43 AM 
 
Discharge Diagnoses: pneumonia (CAP), afib with RVR, hyponatremia, adrenal insufficiency, dehydration, melanoma, steroid induced diabetes Hospital Course: Mr. Agusto Medina was admitted with profound weakness and an elevated wbc count and low sodium. He had developed pneumonia on his CXR of the LLL and was started on cefipime and levaquin. He was placed on supplemental O2 because he was hypoxic on admission. He improved with IVF and antibiotics. He has atrial fibrillation and developed afib with RVR after admission. He was bolused with IV amiodarone. He was placed on stress dose steroids. His sodium level normalized. His afib reverted to NSR on IV amiodarone and he was placed back on po amiodarone. He was seen by oncology and cardiology. He had an ECHO with a normal EF. He was able to ambulate with minimal assistance with PT. He will benefit from HHN/PT on discharge. He will resume po Levaquin on discharge. His wbc is normal. 
 
PCP: Millie Bronson MD 
Consults: Cardiology and Hematology/Oncology Significant Diagnostic Studies: CXR, ECHO 
 
[unfilled] [unfilled] Discharge Exam: 
Visit Vitals /70 (BP 1 Location: Left arm, BP Patient Position: At rest) Pulse 63 Temp 97.4 °F (36.3 °C) Resp 16 Ht 5' 8\" (1.727 m) Wt 174 lb 2.6 oz (79 kg) SpO2 98% BMI 26.48 kg/m² General:  Alert, cooperative, no distress, appears stated age. Head:  Normocephalic, without obvious abnormality, atraumatic. Eyes:  Conjunctivae/corneas clear. PERRL, EOMs intact. Fundi benign Ears:  Normal TMs and external ear canals both ears. Nose: Nares normal. Septum midline. Mucosa normal. No drainage or sinus tenderness.   
Throat: Lips, mucosa, and tongue normal. Teeth and gums normal.  
 Neck: Supple, symmetrical, trachea midline, no adenopathy, thyroid: no enlargement/tenderness/nodules, no carotid bruit and no JVD. Back:   Symmetric, no curvature. ROM normal. No CVA tenderness. Lungs:   Clear to auscultation bilaterally. Chest wall:  No tenderness or deformity. Heart:  Regular rate and rhythm, S1, S2 normal, no murmur, click, rub or gallop. Abdomen:   Soft, non-tender. Bowel sounds normal. No masses,  No organomegaly. Genitalia:    
Rectal:    
Extremities: Extremities normal, atraumatic, no cyanosis or edema. Pulses: 2+ and symmetric all extremities. Skin: Skin color, texture, turgor normal. No rashes or lesions Lymph nodes: Cervical, supraclavicular, and axillary nodes normal.  
Neurologic: CNII-XII intact. Normal strength, sensation and reflexes throughout. Disposition: home Patient Instructions:  
Current Discharge Medication List  
  
CONTINUE these medications which have NOT CHANGED Details  
calcium carbonate (CALCIUM 600) 600 mg calcium (1,500 mg) tablet Take 600 mg by mouth daily. levoFLOXacin (LEVAQUIN) 750 mg tablet Take 1 Tab by mouth daily. Qty: 6 Tab, Refills: 0  
  
nateglinide (STARLIX) 60 mg tablet TAKE 1 TABLET BY MOUTH 3 TIMES A DAY WITH MEALS Qty: 90 Tab, Refills: 3  
  
furosemide (LASIX) 20 mg tablet TAKE 1 TABLET BY MOUTH EVERY OTHER DAY OR AS DIRECTED; Mon, Wed, Fri Refills: 2  
  
hydrocortisone (CORTEF) 10 mg tablet TAKE 1 TABLET BY MOUTH TWICE A DAY. now daily Refills: 10  
  
latanoprost (XALATAN) 0.005 % ophthalmic solution INSTILL 1 DROP BY OPHTHALMIC ROUTE EVERY DAY INTO BOTH EYES AT BEDTIME Refills: 5 PREVIDENT 5000 BOOSTER PLUS 1.1 % pste BRUSH AFTER BREAKFAST AND SUPPER CAN ALSO USE IN FLUORIDE TRAY Refills: 12  
  
diclofenac (VOLTAREN) 1 % gel Apply 2 g to affected area four (4) times daily as needed. Qty: 100 g, Refills: 0  
  
bimatoprost (LUMIGAN) 0.01 % ophthalmic drops Administer 1 Drop to both eyes nightly. cholecalciferol (VITAMIN D3) 1,000 unit tablet Take 1,000 Units by mouth daily. apixaban (ELIQUIS) 5 mg tablet Take 5 mg by mouth two (2) times a day. acetaminophen (TYLENOL EXTRA STRENGTH) 500 mg tablet Take 500 mg by mouth every six (6) hours as needed for Pain. amiodarone (CORDARONE) 200 mg tablet Take 200 mg by mouth daily.   
  
  
STOP taking these medications  
  
 predniSONE (DELTASONE) 10 mg tablet Comments:  
Reason for Stopping:   
   
  
 
 
 
Signed: 
Erin Casey MD 
11/7/2018 
7:43 AM

## 2018-11-07 NOTE — PROGRESS NOTES
Received callback from [facility] stating now they cannot take the patient because he is out of their service area. Informed patient and wife. Referral sent to [facility] [address] and they are able to accept patient and they are aware patient will be going home today. Spoke with Hunter . FOC signed and placed on chart and patient and family in agreement. Orders faxed to her at [phone] with confirmation.

## 2018-11-07 NOTE — PROGRESS NOTES
Patient is being discharged to home today with home health services from [facility] when choice given. KIMBERLYN WILLIS [facility] has accepted and [facility] signed and placed on chart. ALEXA inbasket and aware of dcp. Patient will have family taking him home today.

## 2018-11-07 NOTE — PROGRESS NOTES
PCU SHIFT NURSING NOTE Bedside shift change report given to Vicente Salter (oncoming nurse) by Saloni Ball RN (offgoing nurse). Report included the following information SBAR, Kardex, Intake/Output, MAR, Recent Results and Cardiac Rhythm NSR c BBB. Shift Summary:  
2230: Pt c/o L hip pain. Declines medication, will get up to recliner. 2300: Pt states he is more comfortable. 0130: Pt back in bed. 
0600: Pt had uneventful night. 0700: Bedside shift change report given to Marylou Olivarez RN (oncoming nurse) by Aristides Michael RN (offgoing nurse). Report included the following information SBAR, Kardex, Intake/Output, MAR, Recent Results and Cardiac Rhythm NSR c BBB. Admission Date 11/4/2018 Admission Diagnosis Pneumonia Generalized weakness Hyponatremia Adrenal insufficiency (Tumtum's disease) ([facility] [address] Consults IP CONSULT TO HEMATOLOGY 
IP CONSULT TO CARDIOLOGY Consults []PT []OT []Speech  
[]Case Management  
  
[] Palliative Cardiac Monitoring Order []Yes []No  
 
IV drips []Yes Drip:                            Dose: 
Drip:                            Dose: 
Drip:                            Dose:  
[]No  
 
GI Prophylaxis []Yes []No  
 
 
 
DVT Prophylaxis SCDs:     
     
 Grayson stockings:     
  
[] Medication []Contraindicated []None Activity Level Activity Level: Up with Assistance Activity Assistance: Partial (one person) Purposeful Rounding every 1-2 hour? []Yes Solis Score  Total Score: 4 Bed Alarm (If score 3 or >) []Yes  
[] Refused (See signed refusal form in chart) Horacio Score  Horacio Score: 20 Horacio Score (if score 14 or less) []PMT consult  
[]Wound Care consult []Specialty bed  
[] Nutrition consult Needs prior to discharge:  
Home O2 required:   
[]Yes []No  
 If yes, how much O2 required? Other:  
 Last Bowel Movement: Last Bowel Movement Date: 11/05/18 Influenza Vaccine Received Flu Vaccine for Current Season (usually Sept-March): No  
 Patient/Guardian Refused (Notify MD): Yes Pneumonia Vaccine Diet Active Orders Diet DIET CARDIAC Regular; Consistent Carb 2000kcal  
  
LDAs Venous Access Device power port venous access device 11/04/18 Upper chest (subclavicular area), left (Active) Central Line Being Utilized Yes 11/6/2018  2:35 PM  
Criteria for Appropriate Use Limited/no vessel suitable for conventional peripheral access 11/6/2018  2:35 PM  
Site Assessment Clean, dry, & intact 11/6/2018  2:35 PM  
Date of Last Dressing Change 11/04/18 11/6/2018  2:35 PM  
Dressing Status Clean, dry, & intact 11/6/2018  2:35 PM  
Dressing Type Disk with Chlorhexadine gluconate (CHG); Transparent 11/6/2018  2:35 PM  
Action Taken Blood drawn 11/6/2018  2:45 AM  
Positive Blood Return (Medial Site) Yes 11/6/2018  2:35 PM  
                    
Urinary Catheter Intake & Output Date 11/05/18 1900 - 11/06/18 0659 11/06/18 0700 - 11/07/18 9295 Shift 4831-9114 24 Hour Total 6089-0945 1746-9103 24 Hour Total  
INTAKE  
P.O.  1150 950  950  
  P.O.  1150 950  950  
I. V.(mL/kg/hr) 262.7 602.7 100(0.1)  100 Amiodarone Volume 12.7 252.7 Volume (levoFLOXacin (LEVAQUIN) 750 mg in D5W IVPB) 150 150 0  0 Volume (cefepime (MAXIPIME) 2 g in 0.9% sodium chloride (MBP/ADV) 100 mL) 100 200 100  100 Shift Total(mL/kg) 262.7(3.2) 1752. 7(21.5) 1050(12.9)  1050(12.9) OUTPUT Urine(mL/kg/hr) 4489 7754 Urine Voided 1375 1695 Urine Occurrence(s)  1 x Stool Stool Occurrence(s)  1 x 3 x  3 x Shift Total(mL/kg) [id number]) [id number]) NET -1112.3 57.7 1050  1050 Weight (kg) 81.7 81.7 81.7 81.7 81.7 Readmission Risk Assessment Tool Score High Risk   
      
 23 Total Score 3 Has Seen PCP in Last 6 Months (Yes=3, No=0)  
 5 Pt. Coverage (Medicare=5 , Medicaid, or Self-Pay=4) 15 Charlson Comorbidity Score (Age + Comorbid Conditions) Criteria that do not apply:  
 . Living with Significant Other. Assisted Living. LTAC. SNF. or  
Rehab Patient Length of Stay (>5 days = 3) IP Visits Last 12 Months (1-3=4, 4=9, >4=11) Expected Length of Stay 4d 4h Actual Length of Stay 2

## 2018-11-08 ENCOUNTER — PATIENT OUTREACH (OUTPATIENT)
Dept: INTERNAL MEDICINE CLINIC | Age: 83
End: 2018-11-08

## 2018-11-08 LAB
BACTERIA SPEC CULT: NORMAL
SERVICE CMNT-IMP: NORMAL

## 2018-11-08 NOTE — PROGRESS NOTES
Hospital Discharge Follow-Up Date/Time:  2018 9:57 AM 
 
Patient was admitted to [facility] on 18 and discharged on 18 for pneumonia. The physician discharge summary was available at the time of outreach. Patient was contacted within two business days of discharge. Top Challenges reviewed with the provider Home Health visit to be performed today 18 with report back to PCP/Nurse Navigator Method of communication with provider :face to face Inpatient RRAT score: 23 Was this a readmission? no  
Patient stated reason for the readmission: n/a Nurse Navigator (NN) contacted the patient by telephone to perform post hospital discharge assessment. Verified name and  with patient as identifiers. Provided introduction to self, and explanation of the Nurse Navigator role. Reviewed discharge instructions and red flags with patient who verbalized understanding. Patient given an opportunity to ask questions and does not have any further questions or concerns at this time. The patient agrees to contact the PCP office for questions related to their healthcare. NN provided contact information for future reference. Disease Specific:   N/A Summary of patient's top problems: 1. Community acquired pneumonia - Treated with IVAB during hospital stay. Now on p.o. Antibiotics. 2. Atrial fibrillation - had episode with RVR during hospital stay. Treated and converted with IV amiodarone. Now on 100 mg. By mouth as well as eliquis. Home Health orders at discharge: PT, SN Home Health company: [facility] [address]  (out of service area for [facility]) Date of initial visit: 18 Durable Medical Equipment ordered/company: n/a Durable Medical Equipment received: n/a Barriers to care? none Advance Care Planning:  
Does patient have an Advance Directive:  reviewed and current Medication(s):  
New Medications at Discharge: n/a 
 Changed Medications at Discharge: n/a Discontinued Medications at Discharge: prednisone Medication reconciliation was performed with patient, who verbalizes understanding of administration of home medications. There were no barriers to obtaining medications identified at this time. Referral to Pharm D needed: no  
 
Current Outpatient Medications Medication Sig  
 calcium carbonate (CALCIUM 600) 600 mg calcium (1,500 mg) tablet Take 600 mg by mouth daily.  levoFLOXacin (LEVAQUIN) 750 mg tablet Take 1 Tab by mouth daily.  nateglinide (STARLIX) 60 mg tablet TAKE 1 TABLET BY MOUTH 3 TIMES A DAY WITH MEALS (Patient taking differently: TAKE 1 TABLET BY MOUTH 3 TIMES A DAY WITH MEALS. Now taking once daily)  furosemide (LASIX) 20 mg tablet TAKE 1 TABLET BY MOUTH EVERY OTHER DAY OR AS DIRECTED; Mon, Wed, Fri  
 hydrocortisone (CORTEF) 10 mg tablet TAKE 1 TABLET BY MOUTH TWICE A DAY. now daily  PREVIDENT 5000 BOOSTER PLUS 1.1 % pste BRUSH AFTER BREAKFAST AND SUPPER CAN ALSO USE IN FLUORIDE TRAY  diclofenac (VOLTAREN) 1 % gel Apply 2 g to affected area four (4) times daily as needed.  bimatoprost (LUMIGAN) 0.01 % ophthalmic drops Administer 1 Drop to both eyes nightly.  cholecalciferol (VITAMIN D3) 1,000 unit tablet Take 1,000 Units by mouth daily.  apixaban (ELIQUIS) 5 mg tablet Take 5 mg by mouth two (2) times a day.  acetaminophen (TYLENOL EXTRA STRENGTH) 500 mg tablet Take 500 mg by mouth every six (6) hours as needed for Pain.  amiodarone (CORDARONE) 200 mg tablet Take 100 mg by mouth daily. No current facility-administered medications for this visit. Medications Discontinued During This Encounter Medication Reason  latanoprost (XALATAN) 0.005 % ophthalmic solution Not A Current Medication BS follow up appointment(s):  
Future Appointments Date Time Provider Larissa Lennie 11/9/2018  3:00 PM Dipak Wei MD [address] [place] [address],[facility], [id number]  
 12/7/2018  2:00 PM [facility] PET [facility]J [facility]  
12/7/2018  3:00 PM [address] PET 1 MRMRPET [facility] REG  
3/6/2019  9:10 AM Zoe Lefort, MD [address] [place] [address],[facility], [address] Non-[facility] follow up appointment(s): n/a [facility]:  n/a  
 
 
Goals  Understands red flags post discharge. 11/8/18- NN spoke with patient who was recently hospitalized with pneumonia. Discussed with patient and his wife symptoms to be aware of for further exacerbation of pneumonia, such as fever, malaise, increased congestion or cough despite being on antibiotics. Patient states he is short of breath this morning, and did have some chills when first awakening, but no fever. Home health nurse is due to come to see patient at 1:30 today. Discussed with PCP, and home health will call us after doing visit with an update on patient and his oxygen level. He does have a ROBBIN visit with PCP tomorrow at 3 pm.   NN will wait to hear from [address] this afternoon and assist as needed.  / vs

## 2018-11-08 NOTE — PROGRESS NOTES
11/8/18- NN received a call from [address], who went out to do visit with patient today. She reported  VSS, O2 sat = 93%. Lungs with crackles in bases. No elevated temp. Color looks good. She has ordered an incentive spirometer for the patient.   He will see PCP in f/u tomorrow for [facility] visit / vs

## 2018-11-09 ENCOUNTER — APPOINTMENT (OUTPATIENT)
Dept: GENERAL RADIOLOGY | Age: 83
DRG: 308 | End: 2018-11-09
Attending: EMERGENCY MEDICINE
Payer: MEDICARE

## 2018-11-09 ENCOUNTER — HOSPITAL ENCOUNTER (INPATIENT)
Age: 83
LOS: 4 days | Discharge: HOME HEALTH | DRG: 308 | End: 2018-11-13
Attending: EMERGENCY MEDICINE | Admitting: INTERNAL MEDICINE
Payer: MEDICARE

## 2018-11-09 ENCOUNTER — APPOINTMENT (OUTPATIENT)
Dept: CT IMAGING | Age: 83
DRG: 308 | End: 2018-11-09
Attending: EMERGENCY MEDICINE
Payer: MEDICARE

## 2018-11-09 DIAGNOSIS — I48.0 PAROXYSMAL ATRIAL FIBRILLATION (HCC): Chronic | ICD-10-CM

## 2018-11-09 DIAGNOSIS — J96.01 ACUTE RESPIRATORY FAILURE WITH HYPOXIA (HCC): Primary | ICD-10-CM

## 2018-11-09 DIAGNOSIS — E87.1 HYPONATREMIA: ICD-10-CM

## 2018-11-09 DIAGNOSIS — J81.0 ACUTE PULMONARY EDEMA (HCC): ICD-10-CM

## 2018-11-09 DIAGNOSIS — J18.9 PNEUMONIA OF LEFT LOWER LOBE DUE TO INFECTIOUS ORGANISM: ICD-10-CM

## 2018-11-09 DIAGNOSIS — I50.9 ACUTE CONGESTIVE HEART FAILURE, UNSPECIFIED HEART FAILURE TYPE (HCC): ICD-10-CM

## 2018-11-09 DIAGNOSIS — E27.1 ADRENAL INSUFFICIENCY (ADDISON'S DISEASE) (HCC): ICD-10-CM

## 2018-11-09 LAB
ALBUMIN SERPL-MCNC: 2.4 G/DL (ref 3.5–5)
ALBUMIN/GLOB SERPL: 0.6 {RATIO} (ref 1.1–2.2)
ALP SERPL-CCNC: 59 U/L (ref 45–117)
ALT SERPL-CCNC: 17 U/L (ref 12–78)
ANION GAP SERPL CALC-SCNC: 10 MMOL/L (ref 5–15)
AST SERPL-CCNC: 14 U/L (ref 15–37)
ATRIAL RATE: 122 BPM
BASOPHILS # BLD: 0 K/UL (ref 0–0.1)
BASOPHILS NFR BLD: 0 % (ref 0–1)
BILIRUB SERPL-MCNC: 1 MG/DL (ref 0.2–1)
BNP SERPL-MCNC: 2796 PG/ML (ref 0–450)
BUN SERPL-MCNC: 19 MG/DL (ref 6–20)
BUN/CREAT SERPL: 14 (ref 12–20)
CALCIUM SERPL-MCNC: 7.8 MG/DL (ref 8.5–10.1)
CALCULATED R AXIS, ECG10: -61 DEGREES
CALCULATED T AXIS, ECG11: -8 DEGREES
CHLORIDE SERPL-SCNC: 96 MMOL/L (ref 97–108)
CK MB CFR SERPL CALC: NORMAL % (ref 0–2.5)
CK MB SERPL-MCNC: <1 NG/ML (ref 5–25)
CK SERPL-CCNC: 51 U/L (ref 39–308)
CO2 SERPL-SCNC: 25 MMOL/L (ref 21–32)
CREAT SERPL-MCNC: 1.33 MG/DL (ref 0.7–1.3)
DIAGNOSIS, 93000: NORMAL
DIFFERENTIAL METHOD BLD: ABNORMAL
EOSINOPHIL # BLD: 0.1 K/UL (ref 0–0.4)
EOSINOPHIL NFR BLD: 1 % (ref 0–7)
ERYTHROCYTE [DISTWIDTH] IN BLOOD BY AUTOMATED COUNT: 14.4 % (ref 11.5–14.5)
GLOBULIN SER CALC-MCNC: 3.8 G/DL (ref 2–4)
GLUCOSE BLD STRIP.AUTO-MCNC: 204 MG/DL (ref 65–100)
GLUCOSE BLD STRIP.AUTO-MCNC: 331 MG/DL (ref 65–100)
GLUCOSE SERPL-MCNC: 131 MG/DL (ref 65–100)
HCT VFR BLD AUTO: 39.8 % (ref 36.6–50.3)
HGB BLD-MCNC: 13 G/DL (ref 12.1–17)
IMM GRANULOCYTES # BLD: 0.2 K/UL (ref 0–0.04)
IMM GRANULOCYTES NFR BLD AUTO: 2 % (ref 0–0.5)
LACTATE SERPL-SCNC: 1.3 MMOL/L (ref 0.4–2)
LACTATE SERPL-SCNC: 2.1 MMOL/L (ref 0.4–2)
LYMPHOCYTES # BLD: 0.6 K/UL (ref 0.8–3.5)
LYMPHOCYTES NFR BLD: 5 % (ref 12–49)
MCH RBC QN AUTO: 29.8 PG (ref 26–34)
MCHC RBC AUTO-ENTMCNC: 32.7 G/DL (ref 30–36.5)
MCV RBC AUTO: 91.3 FL (ref 80–99)
MONOCYTES # BLD: 0.6 K/UL (ref 0–1)
MONOCYTES NFR BLD: 5 % (ref 5–13)
NEUTS SEG # BLD: 10.5 K/UL (ref 1.8–8)
NEUTS SEG NFR BLD: 87 % (ref 32–75)
NRBC # BLD: 0 K/UL (ref 0–0.01)
NRBC BLD-RTO: 0 PER 100 WBC
PLATELET # BLD AUTO: 170 K/UL (ref 150–400)
PMV BLD AUTO: 10.8 FL (ref 8.9–12.9)
POTASSIUM SERPL-SCNC: 4.1 MMOL/L (ref 3.5–5.1)
PROT SERPL-MCNC: 6.2 G/DL (ref 6.4–8.2)
Q-T INTERVAL, ECG07: 418 MS
QRS DURATION, ECG06: 158 MS
QTC CALCULATION (BEZET), ECG08: 593 MS
RBC # BLD AUTO: 4.36 M/UL (ref 4.1–5.7)
RBC MORPH BLD: ABNORMAL
SERVICE CMNT-IMP: ABNORMAL
SERVICE CMNT-IMP: ABNORMAL
SODIUM SERPL-SCNC: 131 MMOL/L (ref 136–145)
TROPONIN I SERPL-MCNC: <0.05 NG/ML
VENTRICULAR RATE, ECG03: 121 BPM
WBC # BLD AUTO: 12 K/UL (ref 4.1–11.1)
WBC MORPH BLD: ABNORMAL

## 2018-11-09 PROCEDURE — 74011250636 HC RX REV CODE- 250/636: Performed by: INTERNAL MEDICINE

## 2018-11-09 PROCEDURE — 74011000250 HC RX REV CODE- 250: Performed by: INTERNAL MEDICINE

## 2018-11-09 PROCEDURE — 71250 CT THORAX DX C-: CPT

## 2018-11-09 PROCEDURE — 87040 BLOOD CULTURE FOR BACTERIA: CPT

## 2018-11-09 PROCEDURE — 83880 ASSAY OF NATRIURETIC PEPTIDE: CPT

## 2018-11-09 PROCEDURE — 93005 ELECTROCARDIOGRAM TRACING: CPT

## 2018-11-09 PROCEDURE — P9047 ALBUMIN (HUMAN), 25%, 50ML: HCPCS | Performed by: INTERNAL MEDICINE

## 2018-11-09 PROCEDURE — 65660000000 HC RM CCU STEPDOWN

## 2018-11-09 PROCEDURE — 71045 X-RAY EXAM CHEST 1 VIEW: CPT

## 2018-11-09 PROCEDURE — 74011250637 HC RX REV CODE- 250/637: Performed by: INTERNAL MEDICINE

## 2018-11-09 PROCEDURE — 80053 COMPREHEN METABOLIC PANEL: CPT

## 2018-11-09 PROCEDURE — 84484 ASSAY OF TROPONIN QUANT: CPT

## 2018-11-09 PROCEDURE — 83605 ASSAY OF LACTIC ACID: CPT

## 2018-11-09 PROCEDURE — 36415 COLL VENOUS BLD VENIPUNCTURE: CPT

## 2018-11-09 PROCEDURE — 82550 ASSAY OF CK (CPK): CPT

## 2018-11-09 PROCEDURE — 74011000258 HC RX REV CODE- 258: Performed by: EMERGENCY MEDICINE

## 2018-11-09 PROCEDURE — 77010033678 HC OXYGEN DAILY

## 2018-11-09 PROCEDURE — 96374 THER/PROPH/DIAG INJ IV PUSH: CPT

## 2018-11-09 PROCEDURE — 85025 COMPLETE CBC W/AUTO DIFF WBC: CPT

## 2018-11-09 PROCEDURE — 82962 GLUCOSE BLOOD TEST: CPT

## 2018-11-09 PROCEDURE — 99285 EMERGENCY DEPT VISIT HI MDM: CPT

## 2018-11-09 PROCEDURE — 74011250636 HC RX REV CODE- 250/636: Performed by: EMERGENCY MEDICINE

## 2018-11-09 PROCEDURE — 74011636637 HC RX REV CODE- 636/637: Performed by: INTERNAL MEDICINE

## 2018-11-09 RX ORDER — FUROSEMIDE 10 MG/ML
20 INJECTION INTRAMUSCULAR; INTRAVENOUS 2 TIMES DAILY
Status: DISCONTINUED | OUTPATIENT
Start: 2018-11-10 | End: 2018-11-13 | Stop reason: HOSPADM

## 2018-11-09 RX ORDER — SODIUM CHLORIDE 0.9 % (FLUSH) 0.9 %
5-10 SYRINGE (ML) INJECTION AS NEEDED
Status: DISCONTINUED | OUTPATIENT
Start: 2018-11-09 | End: 2018-11-13 | Stop reason: HOSPADM

## 2018-11-09 RX ORDER — HYDROCORTISONE SODIUM SUCCINATE 100 MG/2ML
50 INJECTION, POWDER, FOR SOLUTION INTRAMUSCULAR; INTRAVENOUS 2 TIMES DAILY
Status: DISCONTINUED | OUTPATIENT
Start: 2018-11-09 | End: 2018-11-11

## 2018-11-09 RX ORDER — ONDANSETRON 2 MG/ML
4 INJECTION INTRAMUSCULAR; INTRAVENOUS
Status: DISCONTINUED | OUTPATIENT
Start: 2018-11-09 | End: 2018-11-13 | Stop reason: HOSPADM

## 2018-11-09 RX ORDER — LATANOPROST 50 UG/ML
1 SOLUTION/ DROPS OPHTHALMIC
Status: DISCONTINUED | OUTPATIENT
Start: 2018-11-09 | End: 2018-11-13 | Stop reason: HOSPADM

## 2018-11-09 RX ORDER — TRAMADOL HYDROCHLORIDE 50 MG/1
25-50 TABLET ORAL
Status: DISCONTINUED | OUTPATIENT
Start: 2018-11-09 | End: 2018-11-13 | Stop reason: HOSPADM

## 2018-11-09 RX ORDER — ACETAMINOPHEN 325 MG/1
650 TABLET ORAL
Status: DISCONTINUED | OUTPATIENT
Start: 2018-11-09 | End: 2018-11-13 | Stop reason: HOSPADM

## 2018-11-09 RX ORDER — FUROSEMIDE 10 MG/ML
20 INJECTION INTRAMUSCULAR; INTRAVENOUS ONCE
Status: COMPLETED | OUTPATIENT
Start: 2018-11-09 | End: 2018-11-09

## 2018-11-09 RX ORDER — DEXTROSE 50 % IN WATER (D50W) INTRAVENOUS SYRINGE
12.5-25 AS NEEDED
Status: DISCONTINUED | OUTPATIENT
Start: 2018-11-09 | End: 2018-11-13 | Stop reason: HOSPADM

## 2018-11-09 RX ORDER — MAGNESIUM SULFATE 100 %
4 CRYSTALS MISCELLANEOUS AS NEEDED
Status: DISCONTINUED | OUTPATIENT
Start: 2018-11-09 | End: 2018-11-13 | Stop reason: HOSPADM

## 2018-11-09 RX ORDER — HYDROCORTISONE SODIUM SUCCINATE 100 MG/2ML
100 INJECTION, POWDER, FOR SOLUTION INTRAMUSCULAR; INTRAVENOUS
Status: COMPLETED | OUTPATIENT
Start: 2018-11-09 | End: 2018-11-09

## 2018-11-09 RX ORDER — LEVOFLOXACIN 5 MG/ML
750 INJECTION, SOLUTION INTRAVENOUS
Status: DISCONTINUED | OUTPATIENT
Start: 2018-11-09 | End: 2018-11-13 | Stop reason: HOSPADM

## 2018-11-09 RX ORDER — ALBUMIN HUMAN 250 G/1000ML
25 SOLUTION INTRAVENOUS ONCE
Status: COMPLETED | OUTPATIENT
Start: 2018-11-09 | End: 2018-11-09

## 2018-11-09 RX ORDER — SODIUM CHLORIDE 0.9 % (FLUSH) 0.9 %
5-10 SYRINGE (ML) INJECTION EVERY 8 HOURS
Status: DISCONTINUED | OUTPATIENT
Start: 2018-11-09 | End: 2018-11-13 | Stop reason: HOSPADM

## 2018-11-09 RX ORDER — INSULIN LISPRO 100 [IU]/ML
INJECTION, SOLUTION INTRAVENOUS; SUBCUTANEOUS
Status: DISCONTINUED | OUTPATIENT
Start: 2018-11-09 | End: 2018-11-13 | Stop reason: HOSPADM

## 2018-11-09 RX ORDER — FUROSEMIDE 10 MG/ML
40 INJECTION INTRAMUSCULAR; INTRAVENOUS
Status: DISCONTINUED | OUTPATIENT
Start: 2018-11-09 | End: 2018-11-09

## 2018-11-09 RX ORDER — ALBUMIN HUMAN 50 G/1000ML
25 SOLUTION INTRAVENOUS ONCE
Status: COMPLETED | OUTPATIENT
Start: 2018-11-10 | End: 2018-11-10

## 2018-11-09 RX ORDER — FUROSEMIDE 20 MG/1
20 TABLET ORAL
Status: ON HOLD | COMMUNITY
End: 2018-11-13 | Stop reason: SDUPTHER

## 2018-11-09 RX ORDER — NATEGLINIDE 120 MG/1
60 TABLET ORAL
Status: DISCONTINUED | OUTPATIENT
Start: 2018-11-09 | End: 2018-11-13 | Stop reason: HOSPADM

## 2018-11-09 RX ORDER — TRAMADOL HYDROCHLORIDE 50 MG/1
25-50 TABLET ORAL
COMMUNITY

## 2018-11-09 RX ADMIN — LEVOFLOXACIN 750 MG: 5 INJECTION, SOLUTION INTRAVENOUS at 16:44

## 2018-11-09 RX ADMIN — HYDROCORTISONE SODIUM SUCCINATE 100 MG: 100 INJECTION, POWDER, FOR SOLUTION INTRAMUSCULAR; INTRAVENOUS at 15:18

## 2018-11-09 RX ADMIN — SODIUM CHLORIDE 500 ML: 900 INJECTION, SOLUTION INTRAVENOUS at 13:04

## 2018-11-09 RX ADMIN — HYDROCORTISONE SODIUM SUCCINATE 50 MG: 100 INJECTION, POWDER, FOR SOLUTION INTRAMUSCULAR; INTRAVENOUS at 21:36

## 2018-11-09 RX ADMIN — APIXABAN 2.5 MG: 2.5 TABLET, FILM COATED ORAL at 21:37

## 2018-11-09 RX ADMIN — LATANOPROST 1 DROP: 50 SOLUTION OPHTHALMIC at 21:37

## 2018-11-09 RX ADMIN — INSULIN LISPRO 4 UNITS: 100 INJECTION, SOLUTION INTRAVENOUS; SUBCUTANEOUS at 21:36

## 2018-11-09 RX ADMIN — AMIODARONE HYDROCHLORIDE 150 MG: 50 INJECTION, SOLUTION INTRAVENOUS at 16:30

## 2018-11-09 RX ADMIN — AMIODARONE HYDROCHLORIDE 1 MG/MIN: 50 INJECTION, SOLUTION INTRAVENOUS at 17:41

## 2018-11-09 RX ADMIN — FUROSEMIDE 20 MG: 10 INJECTION, SOLUTION INTRAMUSCULAR; INTRAVENOUS at 17:40

## 2018-11-09 RX ADMIN — ALBUMIN (HUMAN) 25 G: 0.25 INJECTION, SOLUTION INTRAVENOUS at 16:30

## 2018-11-09 NOTE — PROGRESS NOTES
Primary Nurse Pamela Blankenship RN and misha RN performed a dual skin assessment on this patient, bilateral heels red,boggy,flaky, blanchable redness noted at buttocks

## 2018-11-09 NOTE — PROGRESS NOTES
11/09/18 [address] Vital Signs Temp 98.3 °F (36.8 °C) Pulse (Heart Rate) (!) 101 Resp Rate 22  
O2 Sat (%) 95 % Level of Consciousness Alert /81 MAP (Calculated) 94 MEWS Score 3 Pain 1 Pain Scale 1 Numeric (0 - 10) Pain Intensity 1 0 Patient Stated Pain Goal 0 Oxygen Therapy Pulse via Oximetry 100 beats per minute  
baseline

## 2018-11-09 NOTE — CONSULTS
Consult/Admission NAME: Jess Mckeon :  1931 MRN:  [id number] Date/Time:  2018 5:37 PM 
 
Patient PCP: Betsy Aguilera MD 
________________________________________________________________________ Assessment:  
 
Acute hypoxic respiratory failure. Pulmonary edema. Recurrent A Fib eith RVR  
RB / [facility] Recent pneumonia. Metastatic melanoma Diabetes Prostate CA Hx Kidney CA Hx of A Flutter with CV Plan:  
 
Continue  With Amiodarone for rate control . Further adjustments as needed   
 
 [x]           High complexity decision making was performed Subjective: CHIEF COMPLAINT:  Recurrent A Fib HISTORY OF PRESENT ILLNESS:    
Cortes Mcqueen is a 80 y.o. Male  who ws just discharge from hospital after treatment of pneumonia. Now with SOB , malaise. He had A fib  During admission and converted to NSR with Amiodarone . He is now back in A fib with RVR . We were asked to admit for work up and evaluation of the above problems. Past Medical History:  
Diagnosis Date  Aneurysm ([facility] [address] 10/1/14 BRAIN RECENT DX , followed by dr. hodges, radiologist  
 Atrial flutter [facility])   
 s/p cardioversion  Cancer [facility])  LEFT FOOT ,MELANOMA  Cancer ([facility] [address]  RIGHT KIDNEY  
 Cancer [facility])  PROSTATE  TREATED WITH RADIATION  Chronic pain BACK AND RIGHT HIP  
 Diabetes mellitus, stable ([address] 2017 Steroid induced  Hyperglycemia 2017 Steroid induced: follow up A1C, BMP, discontinue Starlix in wnl  LBP (low back pain)  Left hip pain 2017 Xray shows no obvious Fx or Metastatic lesions, this pain could be arthritic in nature or radicular, will give a corticosteroid injection, if no benefit may need Ortho eval  
 Malignant neoplasm of prostate ([address] 2017  OA (osteoarthritis)  Prostate cancer ([address]  Pure hyperglyceridemia 2017  Renal cell cancer (right)  Small bowel obstruction ([facility] [address] 2017 Past Surgical History:  
Procedure Laterality Date [address] MITRAL VALVE REPAIR  
 [address]  HX CATARACT REMOVAL Bilateral 2011  HX LUMBAR LAMINECTOMY    
 HX ORTHOPAEDIC    
 CARPAL TUNNEL-SERAFIN  HX OTHER SURGICAL  OCT 2014 MELANOMA REMOVED FROM HEEL  
 [address] TURP  
 HX UROLOGICAL Right NEPHRECTOMY  SC COLSC FLX W/REMOVAL LESION BY HOT BX FORCEPS  2012  SC SIGMOIDOSCOPY FLX DX W/COLLJ SPEC BR/WA IF PFRMD  2012 Allergies Allergen Reactions  Relafen [Nabumetone] Itching Meds:  See below Social History Tobacco Use  Smoking status: Former Smoker Packs/day: 0.25 Years: 2.00 Pack years: 0.50 Last attempt to quit: 1953 Years since quittin.6  Smokeless tobacco: Never Used Substance Use Topics  Alcohol use: No  
  
Family History Problem Relation Age of Onset  Diabetes Father  Dementia Mother  Cancer Sister BRAIN  
 Heart Disease Brother  Arthritis-osteo Sister  Anesth Problems Neg Hx REVIEW OF SYSTEMS:   
 []            Unable to obtain  ROS due to --- 
 [x]            Total of 12 systems reviewed as follows: 
 
Constitutional: negative fever, negative chills, negative weight loss Eyes:   negative visual changes ENT:   negative sore throat, tongue or lip swelling Respiratory:  negative cough, negative dyspnea Cards:  negative for chest pain, palpitations, lower extremity edema GI:   negative for nausea, vomiting, diarrhea, and abdominal pain Genitourinary: negative for frequency, dysuria Integument:  negative for rash Hematologic:  negative for easy bruising and gum/nose bleeding Musculoskel: negative for myalgias,  back pain Neurological:  negative for headaches, dizziness, vertigo, weakness Behavl/Psych: negative for feelings of anxiety, depression Pertinent Positives include : 
 
Objective:  
  
Physical Exam: 
 
Last 24hrs VS reviewed since prior progress note. Most recent are: 
 
Visit Vitals /52 (BP Patient Position: At rest) Pulse (!) 101 Temp 98.2 °F (36.8 °C) Resp 22 Ht 5' 8\" (1.727 m) Wt 79 kg (174 lb 2.6 oz) SpO2 95% BMI 26.48 kg/m² No intake or output data in the 24 hours ending 11/09/18 1737 General Appearance: Well developed, well nourished, alert & oriented x 3,  
 no acute distress. Ears/Nose/Mouth/Throat: Pupils equal and round, Hearing grossly normal. 
Neck: Supple. JVP within normal limits. Carotids good upstrokes, with no bruit. Chest: Lungs clear to auscultation bilaterally. Cardiovascular: Regular rate and rhythm, S1S2 normal, no murmur, rubs, gallops. Abdomen: Soft, non-tender, bowel sounds are active. No organomegaly. Extremities: No edema bilaterally. Femoral pulses +2, Distal Pulses +1. Skin: Warm and dry. Neuro: CN II-XII grossly intact, Strength and sensation grossly intact. Data:  
  
Prior to Admission medications Medication Sig Start Date End Date Taking? Authorizing Provider  
furosemide (LASIX) 20 mg tablet Take 20 mg by mouth three (3) days a week. Monday, Wednesday, and Friday   Yes Other, MD Nicki  
traMADol (ULTRAM) 50 mg tablet Take 25-50 mg by mouth every eight (8) hours as needed for Pain. Yes Other, MD Nicki  
calcium carbonate (CALCIUM 600) 600 mg calcium (1,500 mg) tablet Take 600 mg by mouth daily. Yes Provider, Historical  
levoFLOXacin (LEVAQUIN) 750 mg tablet Take 1 Tab by mouth daily. 11/3/18  Yes Camila Ho MD  
nateglinide (STARLIX) 60 mg tablet TAKE 1 TABLET BY MOUTH 3 TIMES A DAY WITH MEALS 8/7/18  Yes Beatris Bates MD  
hydrocortisone (CORTEF) 10 mg tablet TAKE 1 TABLET BY MOUTH TWICE A DAY.  now daily 12/12/17  Yes Provider, Historical  
PREVIDENT 5000 BOOSTER PLUS 1.1 % pste BRUSH AFTER BREAKFAST AND SUPPER CAN ALSO USE IN FLUORIDE TRAY 6/9/17  Yes Provider, Historical  
diclofenac (VOLTAREN) 1 % gel Apply 2 g to affected area four (4) times daily as needed. 7/19/17  Yes Vivian Macedo MD  
bimatoprost (LUMIGAN) 0.01 % ophthalmic drops Administer 1 Drop to both eyes nightly. Yes Provider, Historical  
cholecalciferol (VITAMIN D3) 1,000 unit tablet Take 1,000 Units by mouth daily. Yes Provider, Historical  
apixaban (ELIQUIS) 5 mg tablet Take 2.5 mg by mouth two (2) times a day. Yes Other, MD Nicki  
acetaminophen (TYLENOL EXTRA STRENGTH) 500 mg tablet Take 500 mg by mouth every six (6) hours as needed for Pain. Yes Provider, Historical  
amiodarone (CORDARONE) 200 mg tablet Take 100 mg by mouth daily. Yes Other, MD Nicki  
 
 
Recent Results (from the past 24 hour(s)) EKG, 12 LEAD, INITIAL Collection Time: 11/09/18 12:37 PM  
Result Value Ref Range Ventricular Rate 121 BPM  
 Atrial Rate 122 BPM  
 QRS Duration 158 ms Q-T Interval 418 ms QTC Calculation (Bezet) 593 ms Calculated R Axis -61 degrees Calculated T Axis -8 degrees Diagnosis Poor data quality Possible Atrial fibrillation with rapid ventricular response Left axis deviation Right bundle branch block Cannot rule out Inferior infarct (cited on or before 02-OCT-2014) When compared with ECG of 04-NOV-2018 19:28, 
Possible Atrial fibrillation has replaced Sinus rhythm Confirmed by Ryan Judge ([id number]) on 11/9/2018 4:14:05 PM 
  
LACTIC ACID Collection Time: 11/09/18 12:49 PM  
Result Value Ref Range Lactic acid 2.1 (HH) 0.4 - 2.0 MMOL/L  
METABOLIC PANEL, COMPREHENSIVE Collection Time: 11/09/18 12:49 PM  
Result Value Ref Range Sodium 131 (L) 136 - 145 mmol/L Potassium 4.1 3.5 - 5.1 mmol/L Chloride 96 (L) 97 - 108 mmol/L  
 CO2 25 21 - 32 mmol/L Anion gap 10 5 - 15 mmol/L Glucose 131 (H) 65 - 100 mg/dL BUN 19 6 - 20 MG/DL  Creatinine 1.33 (H) 0.70 - 1.30 MG/DL  
 BUN/Creatinine ratio 14 12 - 20 GFR est AA >60 >60 ml/min/1.73m2 GFR est non-AA 51 (L) >60 ml/min/1.73m2 Calcium 7.8 (L) 8.5 - 10.1 MG/DL Bilirubin, total 1.0 0.2 - 1.0 MG/DL  
 ALT (SGPT) 17 12 - 78 U/L  
 AST (SGOT) 14 (L) 15 - 37 U/L Alk. phosphatase 59 45 - 117 U/L Protein, total 6.2 (L) 6.4 - 8.2 g/dL Albumin 2.4 (L) 3.5 - 5.0 g/dL Globulin 3.8 2.0 - 4.0 g/dL A-G Ratio 0.6 (L) 1.1 - 2.2    
CBC WITH AUTOMATED DIFF Collection Time: 11/09/18 12:49 PM  
Result Value Ref Range WBC 12.0 (H) 4.1 - 11.1 K/uL  
 RBC 4.36 4.10 - 5.70 M/uL  
 HGB 13.0 12.1 - 17.0 g/dL HCT 39.8 36.6 - 50.3 % MCV 91.3 80.0 - 99.0 FL  
 MCH 29.8 26.0 - 34.0 PG  
 MCHC 32.7 30.0 - 36.5 g/dL  
 RDW 14.4 11.5 - 14.5 % PLATELET 471 714 - 883 K/uL MPV 10.8 8.9 - 12.9 FL  
 NRBC 0.0 0  WBC ABSOLUTE NRBC 0.00 0.00 - 0.01 K/uL NEUTROPHILS 87 (H) 32 - 75 % LYMPHOCYTES 5 (L) 12 - 49 % MONOCYTES 5 5 - 13 % EOSINOPHILS 1 0 - 7 % BASOPHILS 0 0 - 1 % IMMATURE GRANULOCYTES 2 (H) 0.0 - 0.5 % ABS. NEUTROPHILS 10.5 (H) 1.8 - 8.0 K/UL  
 ABS. LYMPHOCYTES 0.6 (L) 0.8 - 3.5 K/UL  
 ABS. MONOCYTES 0.6 0.0 - 1.0 K/UL  
 ABS. EOSINOPHILS 0.1 0.0 - 0.4 K/UL  
 ABS. BASOPHILS 0.0 0.0 - 0.1 K/UL  
 ABS. IMM. GRANS. 0.2 (H) 0.00 - 0.04 K/UL  
 DF SMEAR SCANNED    
 RBC COMMENTS NORMOCYTIC, NORMOCHROMIC    
 WBC COMMENTS TOXIC GRANULATION    
CK W/ CKMB & INDEX Collection Time: 11/09/18 12:49 PM  
Result Value Ref Range CK 51 39 - 308 U/L  
 CK - MB <1.0 <3.6 NG/ML  
 CK-MB Index Cannot be calculated 0 - 2.5    
TROPONIN I Collection Time: 11/09/18 12:49 PM  
Result Value Ref Range Troponin-I, Qt. <0.05 <0.05 ng/mL LACTIC ACID Collection Time: 11/09/18  4:40 PM  
Result Value Ref Range  Lactic acid 1.3 0.4 - 2.0 MMOL/L

## 2018-11-09 NOTE — ED NOTES
Bedside and Verbal shift change report given to Celestino Beach (oncoming nurse) by Mariano Astudillo RN (offgoing nurse). Report included the following information SBAR, ED Summary, MAR and Recent Results.

## 2018-11-09 NOTE — PROGRESS NOTES
Pharmacy Clarification of Prior to Admission Medication Regimen The patient was interviewed regarding clarification of the prior to admission medication regimen. Wife and son were present in room and obtained permission from patient to discuss drug regimen with visitor(s) present. Patient was questioned regarding use of any other inhalers, topical products, over the counter medications, herbal medications, vitamin products or ophthalmic/nasal/otic medication use. Information Obtained From: Patient, prescription bottles, RX Query Pertinent Pharmacy Findings: 
? Identified High Alert Medication Information 
o Current Anticoagulants: 
? Name: apixaban (ELIQUIS) 5 mg tablet ? Prescription bottle is prescribed 5 mg BID 
 
PTA medication list was corrected to the following:  
 
Prior to Admission Medications Prescriptions Last Dose Informant Patient Reported? Taking? PREVIDENT 5000 BOOSTER PLUS 1.1 % pste 11/2/2018 at Unknown time Self Yes Yes Sig: BRUSH AFTER BREAKFAST AND SUPPER CAN ALSO USE IN FLUORIDE TRAY  
acetaminophen (TYLENOL EXTRA STRENGTH) 500 mg tablet 11/9/2018 at Unknown time Self Yes Yes Sig: Take 500 mg by mouth every six (6) hours as needed for Pain. amiodarone (CORDARONE) 200 mg tablet 11/9/2018 at Unknown time Other Yes Yes Sig: Take 100 mg by mouth daily. apixaban (ELIQUIS) 5 mg tablet 11/9/2018 at Unknown time Other Yes Yes Sig: Take 2.5 mg by mouth two (2) times a day. bimatoprost (LUMIGAN) 0.01 % ophthalmic drops 11/8/2018 at Unknown time Self Yes Yes Sig: Administer 1 Drop to both eyes nightly. calcium carbonate (CALCIUM 600) 600 mg calcium (1,500 mg) tablet 11/9/2018 at Unknown time Self Yes Yes Sig: Take 600 mg by mouth daily. cholecalciferol (VITAMIN D3) 1,000 unit tablet 11/9/2018 at Unknown time Self Yes Yes Sig: Take 1,000 Units by mouth daily. diclofenac (VOLTAREN) 1 % gel 11/7/2018 at Unknown time Self No Yes Sig: Apply 2 g to affected area four (4) times daily as needed. furosemide (LASIX) 20 mg tablet 11/9/2018 at Unknown time Other Yes Yes Sig: Take 20 mg by mouth three (3) days a week. Monday, Wednesday, and Friday  
hydrocortisone (CORTEF) 10 mg tablet 11/9/2018 at Unknown time Other Yes Yes Sig: TAKE 1 TABLET BY MOUTH TWICE A DAY. now daily  
levoFLOXacin (LEVAQUIN) 750 mg tablet 11/8/2018 at Unknown time Other No Yes Sig: Take 1 Tab by mouth daily. nateglinide (STARLIX) 60 mg tablet 11/9/2018 at Unknown time Other No Yes Sig: TAKE 1 TABLET BY MOUTH 3 TIMES A DAY WITH MEALS  
traMADol (ULTRAM) 50 mg tablet 11/2/2018 at Unknown time Other Yes Yes Sig: Take 25-50 mg by mouth every eight (8) hours as needed for Pain. Facility-Administered Medications: None Thank you, 
Ronnell Paula CPhT Medication History Pharmacy Technician

## 2018-11-09 NOTE — ROUTINE PROCESS
TRANSFER - OUT REPORT: 
 
Verbal report given to Farida(name) on Dc Oliveros  being transferred to PCU(unit) for routine progression of care Report consisted of patients Situation, Background, Assessment and  
Recommendations(SBAR). Information from the following report(s) SBAR and ED Summary was reviewed with the receiving nurse. Lines:  
Venous Access Device power port venous access device 11/04/18 Upper chest (subclavicular area), left (Active) Peripheral IV 11/09/18 Right Wrist (Active) Site Assessment Clean, dry, & intact 11/9/2018 12:54 PM  
Phlebitis Assessment 0 11/9/2018 12:54 PM  
Infiltration Assessment 0 11/9/2018 12:54 PM  
Dressing Status Clean, dry, & intact 11/9/2018 12:54 PM  
Dressing Type Transparent 11/9/2018 12:54 PM  
Hub Color/Line Status Pink;Flushed 11/9/2018 12:54 PM  
Alcohol Cap Used Yes 11/9/2018 12:54 PM  
   
Peripheral IV 11/09/18 Right Antecubital (Active) Site Assessment Clean, dry, & intact 11/9/2018  4:44 PM  
   
Peripheral IV 11/09/18 Left Forearm (Active) Site Assessment Clean, dry, & intact 11/9/2018  4:45 PM  
  
 
Opportunity for questions and clarification was provided. Patient transported with: 
 Monitor

## 2018-11-09 NOTE — ED PROVIDER NOTES
EMERGENCY DEPARTMENT HISTORY AND PHYSICAL EXAM 
 
 
Date: 11/9/2018 Patient Name: Jess Mckeon History of Presenting Illness Chief Complaint Patient presents with  Fatigue  
  pt feeling weak this moring with a fever of >101.5F.  pt was given tylenol but vomited it.  pt also 74% on room air according to EMS. pt felt better on 2L nasal O2  
 Fever History Provided By: Patient, EMS and Records HPI: Jess Mckeon, 80 y.o. male with PMHx significant for atrial flutter, DM, and kidney and prostate cancer, presents via EMS to the ED with cc of SOB, subjective fever, and generalized weakness today. Per EMS, pt had SpO2 ~70s at time of their arrival today. Pt states he was scheduled to have appointment with his PCP PTA today, but was unable to go secondary to his symptoms. Per records, pt was recently admitted to [facility] (11/4/18) for PNA, and was discharged (11/7/18). At time of examination, pt specifically denies CP, SOB, or N/V/D. There are no other complaints, changes, or physical findings at this time. PCP: Betsy Aguilera MD 
 
Current Facility-Administered Medications Medication Dose Route Frequency Provider Last Rate Last Dose  sodium chloride (NS) flush 5-10 mL  5-10 mL IntraVENous PRN Goyo Candelario DO      
 
Current Outpatient Medications Medication Sig Dispense Refill  calcium carbonate (CALCIUM 600) 600 mg calcium (1,500 mg) tablet Take 600 mg by mouth daily.  levoFLOXacin (LEVAQUIN) 750 mg tablet Take 1 Tab by mouth daily. 6 Tab 0  
 nateglinide (STARLIX) 60 mg tablet TAKE 1 TABLET BY MOUTH 3 TIMES A DAY WITH MEALS (Patient taking differently: TAKE 1 TABLET BY MOUTH 3 TIMES A DAY WITH MEALS. Now taking once daily) 90 Tab 3  furosemide (LASIX) 20 mg tablet TAKE 1 TABLET BY MOUTH EVERY OTHER DAY OR AS DIRECTED; Mon, Wed, Fri  2  
 hydrocortisone (CORTEF) 10 mg tablet TAKE 1 TABLET BY MOUTH TWICE A DAY.  now daily  10  
  PREVIDENT 5000 BOOSTER PLUS 1.1 % pste BRUSH AFTER BREAKFAST AND SUPPER CAN ALSO USE IN FLUORIDE TRAY  12  
 diclofenac (VOLTAREN) 1 % gel Apply 2 g to affected area four (4) times daily as needed. 100 g 0  
 bimatoprost (LUMIGAN) 0.01 % ophthalmic drops Administer 1 Drop to both eyes nightly.  cholecalciferol (VITAMIN D3) 1,000 unit tablet Take 1,000 Units by mouth daily.  apixaban (ELIQUIS) 5 mg tablet Take 5 mg by mouth two (2) times a day.  acetaminophen (TYLENOL EXTRA STRENGTH) 500 mg tablet Take 500 mg by mouth every six (6) hours as needed for Pain.  amiodarone (CORDARONE) 200 mg tablet Take 100 mg by mouth daily. Past History Past Medical History: 
Past Medical History:  
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 hodges, radiologist  
 Atrial flutter [facility])   
 s/p cardioversion  Cancer [facility]) 2013 LEFT FOOT ,MELANOMA  Cancer ([address] 1980 RIGHT KIDNEY  
 Cancer [facility]) 1988 PROSTATE  TREATED WITH RADIATION  Chronic pain BACK AND RIGHT HIP  
 Diabetes mellitus, stable ([address] 7/13/2017 Steroid induced  Hyperglycemia 7/13/2017 Steroid induced: follow up A1C, BMP, discontinue Starlix in wnl  LBP (low back pain)  Left hip pain 7/13/2017 Xray shows no obvious Fx or Metastatic lesions, this pain could be arthritic in nature or radicular, will give a corticosteroid injection, if no benefit may need Ortho eval  
 Malignant neoplasm of prostate ([address] 7/13/2017  OA (osteoarthritis)  Prostate cancer ([address]  Pure hyperglyceridemia 7/13/2017  Renal cell cancer (right)  Small bowel obstruction ([address] 7/13/2017 Past Surgical History: 
Past Surgical History:  
Procedure Laterality Date [address] MITRAL VALVE REPAIR  
 [address]  HX CATARACT REMOVAL Bilateral 2011  HX LUMBAR LAMINECTOMY  1990  
 HX ORTHOPAEDIC    
 CARPAL TUNNEL-SERAFIN  HX OTHER SURGICAL  OCT 2014 MELANOMA REMOVED FROM HEEL  
 [facility]  
 HX UROLOGICAL Right NEPHRECTOMY  CA COLSC FLX W/REMOVAL LESION BY HOT BX FORCEPS  2012  CA SIGMOIDOSCOPY FLX DX W/COLLJ SPEC BR/WA IF PFRMD  2012 Family History: 
Family History Problem Relation Age of Onset  Diabetes Father  Dementia Mother  Cancer Sister BRAIN  
 Heart Disease Brother  Arthritis-osteo Sister  Anesth Problems Neg Hx Social History: 
Social History Tobacco Use  Smoking status: Former Smoker Packs/day: 0.25 Years: 2.00 Pack years: 0.50 Last attempt to quit: 1953 Years since quittin.6  Smokeless tobacco: Never Used Substance Use Topics  Alcohol use: No  
 Drug use: No  
 
 
Allergies: Allergies Allergen Reactions  Relafen [Nabumetone] Itching Review of Systems Review of Systems Constitutional: Positive for fever (subjective). Negative for appetite change, chills and fatigue. HENT: Negative. Negative for congestion, rhinorrhea, sinus pressure and sore throat. Eyes: Negative. Respiratory: Positive for shortness of breath. Negative for cough, choking, chest tightness and wheezing. Cardiovascular: Negative. Negative for chest pain, palpitations and leg swelling. Gastrointestinal: Negative for abdominal pain, constipation, diarrhea, nausea and vomiting. Endocrine: Negative. Genitourinary: Negative. Negative for difficulty urinating, dysuria, flank pain and urgency. Musculoskeletal: Negative. Skin: Negative. Neurological: Positive for weakness (generalized). Negative for dizziness, speech difficulty, light-headedness, numbness and headaches. Psychiatric/Behavioral: Negative. All other systems reviewed and are negative. Physical Exam  
Physical Exam  
Constitutional: He is oriented to person, place, and time.  He appears well-developed and well-nourished. He appears distressed (Appears ill). HENT:  
Head: Normocephalic and atraumatic. Mouth/Throat: Oropharynx is clear and moist. No oropharyngeal exudate. Eyes: Conjunctivae and EOM are normal. Pupils are equal, round, and reactive to light. Neck: Normal range of motion. Neck supple. No JVD present. No tracheal deviation present. Cardiovascular: Normal heart sounds and intact distal pulses. No murmur heard. Tachycardic, irregular Pulmonary/Chest: Effort normal. No stridor. No respiratory distress. He has no wheezes. He has rales. He exhibits no tenderness. Abdominal: Soft. He exhibits no distension. There is no tenderness. There is no rebound and no guarding. Musculoskeletal: Normal range of motion. He exhibits no edema or tenderness. Neurological: He is alert and oriented to person, place, and time. No cranial nerve deficit. No gross motor or sensory deficits Skin: Skin is warm and dry. He is not diaphoretic. Psychiatric: He has a normal mood and affect. His behavior is normal.  
Nursing note and vitals reviewed. Diagnostic Study Results Labs - Recent Results (from the past 12 hour(s)) EKG, 12 LEAD, INITIAL Collection Time: 11/09/18 12:37 PM  
Result Value Ref Range Ventricular Rate 121 BPM  
 Atrial Rate 122 BPM  
 QRS Duration 158 ms Q-T Interval 418 ms QTC Calculation (Bezet) 593 ms Calculated R Axis -61 degrees Calculated T Axis -8 degrees Diagnosis Atrial fibrillation with rapid ventricular response Left axis deviation Right bundle branch block Inferior infarct (cited on or before 02-OCT-2014) When compared with ECG of 04-NOV-2018 19:28, Atrial fibrillation has replaced Sinus rhythm LACTIC ACID Collection Time: 11/09/18 12:49 PM  
Result Value Ref Range Lactic acid 2.1 (HH) 0.4 - 2.0 MMOL/L  
METABOLIC PANEL, COMPREHENSIVE Collection Time: 11/09/18 12:49 PM  
Result Value Ref Range Sodium 131 (L) 136 - 145 mmol/L Potassium 4.1 3.5 - 5.1 mmol/L Chloride 96 (L) 97 - 108 mmol/L  
 CO2 25 21 - 32 mmol/L Anion gap 10 5 - 15 mmol/L Glucose 131 (H) 65 - 100 mg/dL BUN 19 6 - 20 MG/DL Creatinine 1.33 (H) 0.70 - 1.30 MG/DL  
 BUN/Creatinine ratio 14 12 - 20 GFR est AA >60 >60 ml/min/1.73m2 GFR est non-AA 51 (L) >60 ml/min/1.73m2 Calcium 7.8 (L) 8.5 - 10.1 MG/DL Bilirubin, total 1.0 0.2 - 1.0 MG/DL  
 ALT (SGPT) 17 12 - 78 U/L  
 AST (SGOT) 14 (L) 15 - 37 U/L Alk. phosphatase 59 45 - 117 U/L Protein, total 6.2 (L) 6.4 - 8.2 g/dL Albumin 2.4 (L) 3.5 - 5.0 g/dL Globulin 3.8 2.0 - 4.0 g/dL A-G Ratio 0.6 (L) 1.1 - 2.2    
CBC WITH AUTOMATED DIFF Collection Time: 11/09/18 12:49 PM  
Result Value Ref Range WBC 12.0 (H) 4.1 - 11.1 K/uL  
 RBC 4.36 4.10 - 5.70 M/uL  
 HGB 13.0 12.1 - 17.0 g/dL HCT 39.8 36.6 - 50.3 % MCV 91.3 80.0 - 99.0 FL  
 MCH 29.8 26.0 - 34.0 PG  
 MCHC 32.7 30.0 - 36.5 g/dL  
 RDW 14.4 11.5 - 14.5 % PLATELET 854 603 - 220 K/uL MPV 10.8 8.9 - 12.9 FL  
 NRBC 0.0 0  WBC ABSOLUTE NRBC 0.00 0.00 - 0.01 K/uL NEUTROPHILS 87 (H) 32 - 75 % LYMPHOCYTES 5 (L) 12 - 49 % MONOCYTES 5 5 - 13 % EOSINOPHILS 1 0 - 7 % BASOPHILS 0 0 - 1 % IMMATURE GRANULOCYTES 2 (H) 0.0 - 0.5 % ABS. NEUTROPHILS 10.5 (H) 1.8 - 8.0 K/UL  
 ABS. LYMPHOCYTES 0.6 (L) 0.8 - 3.5 K/UL  
 ABS. MONOCYTES 0.6 0.0 - 1.0 K/UL  
 ABS. EOSINOPHILS 0.1 0.0 - 0.4 K/UL  
 ABS. BASOPHILS 0.0 0.0 - 0.1 K/UL  
 ABS. IMM. GRANS. 0.2 (H) 0.00 - 0.04 K/UL  
 DF SMEAR SCANNED    
 RBC COMMENTS NORMOCYTIC, NORMOCHROMIC    
 WBC COMMENTS TOXIC GRANULATION    
CK W/ CKMB & INDEX Collection Time: 11/09/18 12:49 PM  
Result Value Ref Range CK 51 39 - 308 U/L  
 CK - MB <1.0 <3.6 NG/ML  
 CK-MB Index Cannot be calculated 0 - 2.5    
TROPONIN I Collection Time: 11/09/18 12:49 PM  
Result Value Ref Range Troponin-I, Qt. <0.05 <0.05 ng/mL Radiologic Studies - CXR Results  (Last 48 hours) 11/09/18 1303  XR CHEST PORT Final result Impression:  IMPRESSION:   
   
Development of a mild pulmonary edema pattern. Narrative:  EXAM:  XR CHEST PORT INDICATION:  Sepsis weakness, fever COMPARISON:  11/3/2018 FINDINGS:  
   
A portable AP radiograph of the chest was obtained at 12:44 hours. The patient  
is on a cardiac monitor. There is mild vascular congestion and perihilar  
haziness which have developed since the previous study. Findings are somewhat  
asymmetric in the right upper lobe but generally the pattern favors pulmonary  
edema. Patchy densities in the left lung base noted on the previous study  
demonstrate no significant change. There is no significant pleural fluid. There  
is unchanged cardiomegaly. The tip of the infusion catheter is in the region of  
the atriocaval junction. The patient has undergone prior median sternotomy. Medical Decision Making I am the first provider for this patient. I reviewed the vital signs, available nursing notes, past medical history, past surgical history, family history and social history. Vital Signs-Reviewed the patient's vital signs. Patient Vitals for the past 12 hrs: 
 Temp Pulse Resp BP SpO2  
11/09/18 1327  (!) 102 24 100/67 97 % 11/09/18 1319     96 % 11/09/18 1237 98.2 °F (36.8 °C) (!) 105 23 99/50 91 % Pulse Oximetry Analysis - 91% on RA Cardiac Monitor:  
Rate: 120 bpm 
Rhythm: Atrial Fibrillation EKG interpretation: (Preliminary) Afib, RVR, rate 120, left axis, absent pr, prolonged qrs with RBBB, no acute ST changes, Novella Hashimoto, DO 
 
 
Records Reviewed: Nursing Notes, Old Medical Records, Ambulance Run Sheet, Previous Radiology Studies and Previous Laboratory Studies Provider Notes (Medical Decision Making): DDx: acute respiratory failure, PNA, CHF 
 
ED Course: Initial assessment performed. The patients presenting problems have been discussed, and they are in agreement with the care plan formulated and outlined with them. I have encouraged them to ask questions as they arise throughout their visit. 
 
1:03 PM 
Pt re-evaluated, SpO2 dropped to low ~80s with minimal movement in ED bed. Pt placed O2 by NC at 2L. Initially pt upon arrival afebrile, tachycardic, with SOA. Pt d/c 2 days ago from hospital, dx of pneumonia, pt still taking Levaquin. Pt also has a hx of of adrenal insufficiency, pt has been on hydrocortisone. PCXR obtained appeared to be pulmonary edema, pt afebrile and I do not feel his presentation for this visit is c/w an active infection. Initially for hypotn pt given 500mL NS bolus. This had been completed prior to CXR. Will hold additional fluids, will dose pt with 100mg hydrocortisone, discuss with Cardiology rate control. BP improve some. CT obtained which  showed evidence of pulmonary edema favored over pneumonia. Pt given Amiodarone bolus an dstarted on gtt. IV Lasix ordered. CONSULT NOTE:  
1:47 PM 
Mary White DO spoke with Pedro Dahl MD, Specialty: Hospitalist 
Discussed pt's hx, disposition, and available diagnostic and imaging results. Reviewed care plans. Consultant will evaluate pt for admission. Consult Note: 
3:30 PM 
Nahed Alarcon DO spoke with Dr. Apple Shoemaker Specialty: Cardiology Discussed pts hx, disposition, and available diagnostic and imaging results. Reviewed care plans. Give Amiodarone bolus and start gtt, will see pt in consult, Elana Cushing, DO 
 
 
 
Critical Care Time: CRITICAL CARE NOTE : 
 
1:51 PM 
 
IMPENDING DETERIORATION -Respiratory and Cardiovascular ASSOCIATED RISK FACTORS - Hypotension, Hypoxia and Dysrhythmia MANAGEMENT- Bedside Assessment and Supervision of Care INTERPRETATION -  Xrays, CT Scan, ECG, Blood Pressure, Cardiac Output Measures  and Labs INTERVENTIONS - hemodynamic mngmt- hydrocortisone, oxygen, Amiodarone bolus/gtt, IV Lasix CASE REVIEW - Hospitalist, Medical Sub-Specialist, Nursing and Family TREATMENT RESPONSE -Improved PERFORMED BY - Self I have spent 40 minutes of critical care time involved in lab review, consultations with specialist, family decision- making, bedside attention and documentation. During this entire length of time I was immediately available to the patient . Shanel Venegas DO Disposition: 
ADMIT NOTE: 
1:47 PM 
The patient is being admitted to the hospital.  The results of their tests and reasons for their admission have been discussed with the patient and/or available family. They convey agreement and understanding for the need to be admitted and for their admission diagnosis. PLAN: 
1. Admit to Hospitalist 
 
Diagnosis Clinical Impression: 1. Acute respiratory failure with hypoxia ([address] 2. Acute pulmonary edema (HCC) 3. Atrial Fibrillation w/ RVR Attestations: This note is prepared by Romain Lantigua, acting as Scribe for Anup Ansari DO. Anup Ansari DO: The scribe's documentation has been prepared under my direction and personally reviewed by me in its entirety. I confirm that the note above accurately reflects all work, treatment, procedures, and medical decision making performed by me.

## 2018-11-09 NOTE — H&P
Hospitalist Admission NoteNAME: Alycia Ladd :  1931 MRN:  [id number] Date/Time:  2018 4:10 PM 
 
Patient PCP: Darian Lucas MD 
______________________________________________________________________ Given the patient's current clinical presentation, I have a high level of concern for decompensation if discharged from the emergency department. Complex decision making was performed, which includes reviewing the patient's available past medical records, laboratory results, and x-ray films. My assessment of this patient's clinical condition and my plan of care is as follows. Assessment / Plan: 
Acute respiratory failure with hypoxia POA Sirs with tachypnea, tachycardia and leukocytosis r/o sepsis Due to Acute on chronic diastolic heart failure in settings of hypoalbumenemic state in settings of metastatic melanoma Unable to rule out PNA completely (high risk pt as been on chemo) Admit to PCU BP borderline low, will given Albumin 25gm now and repeat dose in am and place on Lasix 20mg BID and first dose now ED spoke to Cardiology Dr Pedro Syed who will be seeing the patient Px IV levaquin On chronically on steroids, risk of adrenal crisis, will place on high dose steroids A-fib with RVR Amiodarone bolus and drip already ordered by ED Continue Eliquis Cardiology to see the patient Check TSH 
2d Echo recently done Metastatic Melanoma See Dr Terese Medina On every 2 weeks chemo Adrenal insufficiency (Jose's disease) Switch to high dose steroids as above for now as risk for adrenal crisis Hyponatremia Monitor lytes with IV diuretics as above Code Status: Full Surrogate Decision Maker: Wife and Son DVT Prophylaxis: Eliquis Subjective: CHIEF COMPLAINT: Shortness of breath, generalized weakness HISTORY OF PRESENT ILLNESS:    
Corey Montalvo is a 80 y.o.    male who presents with generalized weakness and shortness of breath. Pt reported he was recently discharged from hospital and was feeling good at the time of discharge. Since yesterday he started to feel sick again with generalized weakness and chills. Today he felt short winded so called EMS and he was found hypoxic. Pt denies any nausea, vomiting, diarrhea, chest pain, nausea, vomiting, diarrhea. In ED pt started on O2. CXR and CT chest showed pulmonary edema We were asked to admit for work up and evaluation of the above problems. Past Medical History:  
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 hodges, radiologist  
 Atrial flutter [facility])   
 s/p cardioversion  Cancer [facility]) 2013 LEFT FOOT ,MELANOMA  Cancer ([address] 1980 RIGHT KIDNEY  
 Cancer [facility]) 1988 PROSTATE  TREATED WITH RADIATION  Chronic pain BACK AND RIGHT HIP  
 Diabetes mellitus, stable ([address] 7/13/2017 Steroid induced  Hyperglycemia 7/13/2017 Steroid induced: follow up A1C, BMP, discontinue Starlix in wnl  LBP (low back pain)  Left hip pain 7/13/2017 Xray shows no obvious Fx or Metastatic lesions, this pain could be arthritic in nature or radicular, will give a corticosteroid injection, if no benefit may need Ortho eval  
 Malignant neoplasm of prostate ([address] 7/13/2017  OA (osteoarthritis)  Prostate cancer ([address]  Pure hyperglyceridemia 7/13/2017  Renal cell cancer (right)  Small bowel obstruction ([address] 7/13/2017 Past Surgical History:  
Procedure Laterality Date [place] MITRAL VALVE REPAIR  
 [address]  HX CATARACT REMOVAL Bilateral 2011  HX LUMBAR LAMINECTOMY  1990  
 HX ORTHOPAEDIC    
 CARPAL TUNNEL-SERAFIN  HX OTHER SURGICAL  OCT 2014 MELANOMA REMOVED FROM HEEL  
 [address] TURP  
 HX UROLOGICAL Right NEPHRECTOMY  SC COLSC FLX W/REMOVAL LESION BY HOT BX FORCEPS  9/24/2012  CO SIGMOIDOSCOPY FLX DX W/COLLJ SPEC BR/WA IF PFRMD  2012 Social History Tobacco Use  Smoking status: Former Smoker Packs/day: 0.25 Years: 2.00 Pack years: 0.50 Last attempt to quit: 1953 Years since quittin.6  Smokeless tobacco: Never Used Substance Use Topics  Alcohol use: No  
  
 
Family History Problem Relation Age of Onset  Diabetes Father  Dementia Mother  Cancer Sister BRAIN  
 Heart Disease Brother  Arthritis-osteo Sister  Anesth Problems Neg Hx Allergies Allergen Reactions  Relafen [Nabumetone] Itching Prior to Admission medications Medication Sig Start Date End Date Taking? Authorizing Provider  
furosemide (LASIX) 20 mg tablet Take 20 mg by mouth three (3) days a week. Monday, Wednesday, and Friday   Yes Other, MD Nicki  
traMADol (ULTRAM) 50 mg tablet Take 25-50 mg by mouth every eight (8) hours as needed for Pain. Yes Other, MD Nicki  
calcium carbonate (CALCIUM 600) 600 mg calcium (1,500 mg) tablet Take 600 mg by mouth daily. Yes Provider, Historical  
levoFLOXacin (LEVAQUIN) 750 mg tablet Take 1 Tab by mouth daily. 11/3/18  Yes Cherelle Briseno MD  
nateglinide (STARLIX) 60 mg tablet TAKE 1 TABLET BY MOUTH 3 TIMES A DAY WITH MEALS 18  Yes Sean Mak MD  
hydrocortisone (CORTEF) 10 mg tablet TAKE 1 TABLET BY MOUTH TWICE A DAY. now daily 17  Yes Provider, Historical  
PREVIDENT 5000 BOOSTER PLUS 1.1 % pste BRUSH AFTER BREAKFAST AND SUPPER CAN ALSO USE IN FLUORIDE TRAY 17  Yes Provider, Historical  
diclofenac (VOLTAREN) 1 % gel Apply 2 g to affected area four (4) times daily as needed. 17  Yes Sean Mak MD  
bimatoprost (LUMIGAN) 0.01 % ophthalmic drops Administer 1 Drop to both eyes nightly. Yes Provider, Historical  
cholecalciferol (VITAMIN D3) 1,000 unit tablet Take 1,000 Units by mouth daily.    Yes Provider, Historical  
 apixaban (ELIQUIS) 5 mg tablet Take 2.5 mg by mouth two (2) times a day. Yes Nicki Alonzo MD  
acetaminophen (TYLENOL EXTRA STRENGTH) 500 mg tablet Take 500 mg by mouth every six (6) hours as needed for Pain. Yes Provider, Gabby  
amiodarone (CORDARONE) 200 mg tablet Take 100 mg by mouth daily. Yes Cheri, MD Nicki  
 
 
REVIEW OF SYSTEMS:    
I am not able to complete the review of systems because: The patient is intubated and sedated The patient has altered mental status due to his acute medical problems The patient has baseline aphasia from prior stroke(s) The patient has baseline dementia and is not reliable historian The patient is in acute medical distress and unable to provide information Total of 12 systems reviewed as follows:   
   POSITIVE= underlined text  Negative = text not underlined General:  fever, chills, sweats, generalized weakness, weight loss/gain,  
   loss of appetite Eyes:    blurred vision, eye pain, loss of vision, double vision ENT:    rhinorrhea, pharyngitis Respiratory:   cough, sputum production, SOB, JARQUIN, wheezing, pleuritic pain  
Cardiology:   chest pain, palpitations, orthopnea, PND, edema, syncope Gastrointestinal:  abdominal pain , N/V, diarrhea, dysphagia, constipation, bleeding Genitourinary:  frequency, urgency, dysuria, hematuria, incontinence Muskuloskeletal :  arthralgia, myalgia, back pain Hematology:  easy bruising, nose or gum bleeding, lymphadenopathy Dermatological: rash, ulceration, pruritis, color change / jaundice Endocrine:   hot flashes or polydipsia Neurological:  headache, dizziness, confusion, focal weakness, paresthesia, Speech difficulties, memory loss, gait difficulty Psychological: Feelings of anxiety, depression, agitation Objective: VITALS:   
Visit Vitals /52 (BP Patient Position: At rest) Pulse (!) 105 Temp 98.2 °F (36.8 °C) Resp 29 Ht 5' 8\" (1.727 m) Wt 79 kg (174 lb 2.6 oz) SpO2 93% BMI 26.48 kg/m² PHYSICAL EXAM: 
 
General:    Alert, cooperative, no distress, appears stated age. HEENT: Atraumatic, anicteric sclerae, pink conjunctivae No oral ulcers, mucosa moist, throat clear, dentition fair Neck:  Supple, symmetrical,  thyroid: non tender Lungs:   Mild crackles. No Wheezing or Rhonchi. No rales. Chest wall:  No tenderness  No Accessory muscle use. Heart:   Regular  rhythm,  No  murmur   + edema Abdomen:   Soft, non-tender. Not distended. Bowel sounds normal 
Extremities: No cyanosis. No clubbing,   
  Skin turgor normal, Capillary refill normal, Radial dial pulse 2+ Skin:     Not pale. Not Jaundiced  No rashes Psych:  Good insight. Not depressed. Not anxious or agitated. Neurologic: EOMs intact. No facial asymmetry. No aphasia or slurred speech. Symmetrical strength, Sensation grossly intact. Alert and oriented X 4.  
 
_______________________________________________________________________ Care Plan discussed with: 
  Comments Patient y Family  y At bedside RN y   
Care Manager Consultant:     
_______________________________________________________________________ Expected  Disposition:  
Home with Family y HH/PT/OT/RN   
SNF/LTC   
MELONY   
________________________________________________________________________ TOTAL TIME: 60 Minutes Critical Care Provided     Minutes non procedure based Comments  
 y Reviewed previous records  
>50% of visit spent in counseling and coordination of care y Discussion with patient and family and questions answered 
  
 
________________________________________________________________________ Signed: Jose Gonzales MD 
 
Procedures: see electronic medical records for all procedures/Xrays and details which were not copied into this note but were reviewed prior to creation of Plan. LAB DATA REVIEWED:   
Recent Results (from the past 24 hour(s)) EKG, 12 LEAD, INITIAL Collection Time: 11/09/18 12:37 PM  
Result Value Ref Range Ventricular Rate 121 BPM  
 Atrial Rate 122 BPM  
 QRS Duration 158 ms Q-T Interval 418 ms QTC Calculation (Bezet) 593 ms Calculated R Axis -61 degrees Calculated T Axis -8 degrees Diagnosis Atrial fibrillation with rapid ventricular response Left axis deviation Right bundle branch block Inferior infarct (cited on or before 02-OCT-2014) When compared with ECG of 04-NOV-2018 19:28, Atrial fibrillation has replaced Sinus rhythm LACTIC ACID Collection Time: 11/09/18 12:49 PM  
Result Value Ref Range Lactic acid 2.1 (HH) 0.4 - 2.0 MMOL/L  
METABOLIC PANEL, COMPREHENSIVE Collection Time: 11/09/18 12:49 PM  
Result Value Ref Range Sodium 131 (L) 136 - 145 mmol/L Potassium 4.1 3.5 - 5.1 mmol/L Chloride 96 (L) 97 - 108 mmol/L  
 CO2 25 21 - 32 mmol/L Anion gap 10 5 - 15 mmol/L Glucose 131 (H) 65 - 100 mg/dL BUN 19 6 - 20 MG/DL Creatinine 1.33 (H) 0.70 - 1.30 MG/DL  
 BUN/Creatinine ratio 14 12 - 20 GFR est AA >60 >60 ml/min/1.73m2 GFR est non-AA 51 (L) >60 ml/min/1.73m2 Calcium 7.8 (L) 8.5 - 10.1 MG/DL Bilirubin, total 1.0 0.2 - 1.0 MG/DL  
 ALT (SGPT) 17 12 - 78 U/L  
 AST (SGOT) 14 (L) 15 - 37 U/L Alk. phosphatase 59 45 - 117 U/L Protein, total 6.2 (L) 6.4 - 8.2 g/dL Albumin 2.4 (L) 3.5 - 5.0 g/dL Globulin 3.8 2.0 - 4.0 g/dL A-G Ratio 0.6 (L) 1.1 - 2.2    
CBC WITH AUTOMATED DIFF Collection Time: 11/09/18 12:49 PM  
Result Value Ref Range WBC 12.0 (H) 4.1 - 11.1 K/uL  
 RBC 4.36 4.10 - 5.70 M/uL  
 HGB 13.0 12.1 - 17.0 g/dL HCT 39.8 36.6 - 50.3 % MCV 91.3 80.0 - 99.0 FL  
 MCH 29.8 26.0 - 34.0 PG  
 MCHC 32.7 30.0 - 36.5 g/dL  
 RDW 14.4 11.5 - 14.5 % PLATELET 037 623 - 004 K/uL MPV 10.8 8.9 - 12.9 FL  
 NRBC 0.0 0  WBC ABSOLUTE NRBC 0.00 0.00 - 0.01 K/uL NEUTROPHILS 87 (H) 32 - 75 % LYMPHOCYTES 5 (L) 12 - 49 % MONOCYTES 5 5 - 13 % EOSINOPHILS 1 0 - 7 % BASOPHILS 0 0 - 1 % IMMATURE GRANULOCYTES 2 (H) 0.0 - 0.5 % ABS. NEUTROPHILS 10.5 (H) 1.8 - 8.0 K/UL  
 ABS. LYMPHOCYTES 0.6 (L) 0.8 - 3.5 K/UL  
 ABS. MONOCYTES 0.6 0.0 - 1.0 K/UL  
 ABS. EOSINOPHILS 0.1 0.0 - 0.4 K/UL  
 ABS. BASOPHILS 0.0 0.0 - 0.1 K/UL  
 ABS. IMM. GRANS. 0.2 (H) 0.00 - 0.04 K/UL  
 DF SMEAR SCANNED    
 RBC COMMENTS NORMOCYTIC, NORMOCHROMIC    
 WBC COMMENTS TOXIC GRANULATION    
CK W/ CKMB & INDEX Collection Time: 11/09/18 12:49 PM  
Result Value Ref Range CK 51 39 - 308 U/L  
 CK - MB <1.0 <3.6 NG/ML  
 CK-MB Index Cannot be calculated 0 - 2.5    
TROPONIN I Collection Time: 11/09/18 12:49 PM  
Result Value Ref Range Troponin-I, Qt. <0.05 <0.05 ng/mL

## 2018-11-09 NOTE — PROGRESS NOTES
Pharmacy Automatic Renal Dosing Protocol - Antimicrobials Indication for Antimicrobials: pneumonia Current Regimen of Each Antimicrobial: 
Levofloxacin 750 mg IV q48H, day #1, 18 Previous Antimicrobial Therapy: N/A Significant Cultures:  
Blood cultures 18 - pending Radiology / Imaging results: (X-ray, CT scan or MRI): N/A Paralysis, amputations, malnutrition: N/A Labs: 
Recent Labs 18 
1249 18 
0931 CREA 1.33* 1.17 BUN 19 20 WBC 12.0* 9.0 Temp (24hrs), Av.2 °F (36.8 °C), Min:98.2 °F (36.8 °C), Max:98.2 °F (36.8 °C) Creatinine Clearance (mL/min) or Dialysis: ~ 38.6 ml/min Impression/Plan:  
· Dose is appropriate for indication and renal function · Antimicrobial stop date TBA Pharmacy will follow daily and adjust medications as appropriate for renal function and/or serum levels. Thank you, Loli Corral, PHARMD

## 2018-11-10 LAB
ALBUMIN SERPL-MCNC: 2.4 G/DL (ref 3.5–5)
ALBUMIN/GLOB SERPL: 0.7 {RATIO} (ref 1.1–2.2)
ALP SERPL-CCNC: 52 U/L (ref 45–117)
ALT SERPL-CCNC: 14 U/L (ref 12–78)
ANION GAP SERPL CALC-SCNC: 9 MMOL/L (ref 5–15)
AST SERPL-CCNC: 12 U/L (ref 15–37)
BASOPHILS # BLD: 0 K/UL (ref 0–0.1)
BASOPHILS NFR BLD: 0 % (ref 0–1)
BILIRUB SERPL-MCNC: 0.9 MG/DL (ref 0.2–1)
BUN SERPL-MCNC: 22 MG/DL (ref 6–20)
BUN/CREAT SERPL: 19 (ref 12–20)
CALCIUM SERPL-MCNC: 7.6 MG/DL (ref 8.5–10.1)
CHLORIDE SERPL-SCNC: 99 MMOL/L (ref 97–108)
CO2 SERPL-SCNC: 25 MMOL/L (ref 21–32)
CREAT SERPL-MCNC: 1.18 MG/DL (ref 0.7–1.3)
DIFFERENTIAL METHOD BLD: ABNORMAL
EOSINOPHIL # BLD: 0 K/UL (ref 0–0.4)
EOSINOPHIL NFR BLD: 0 % (ref 0–7)
ERYTHROCYTE [DISTWIDTH] IN BLOOD BY AUTOMATED COUNT: 14.1 % (ref 11.5–14.5)
GLOBULIN SER CALC-MCNC: 3.4 G/DL (ref 2–4)
GLUCOSE BLD STRIP.AUTO-MCNC: 152 MG/DL (ref 65–100)
GLUCOSE BLD STRIP.AUTO-MCNC: 178 MG/DL (ref 65–100)
GLUCOSE BLD STRIP.AUTO-MCNC: 211 MG/DL (ref 65–100)
GLUCOSE BLD STRIP.AUTO-MCNC: 278 MG/DL (ref 65–100)
GLUCOSE SERPL-MCNC: 225 MG/DL (ref 65–100)
HCT VFR BLD AUTO: 37.2 % (ref 36.6–50.3)
HGB BLD-MCNC: 12.2 G/DL (ref 12.1–17)
IMM GRANULOCYTES # BLD: 0.1 K/UL (ref 0–0.04)
IMM GRANULOCYTES NFR BLD AUTO: 1 % (ref 0–0.5)
LYMPHOCYTES # BLD: 0.3 K/UL (ref 0.8–3.5)
LYMPHOCYTES NFR BLD: 2 % (ref 12–49)
MCH RBC QN AUTO: 29.7 PG (ref 26–34)
MCHC RBC AUTO-ENTMCNC: 32.8 G/DL (ref 30–36.5)
MCV RBC AUTO: 90.5 FL (ref 80–99)
MONOCYTES # BLD: 0.3 K/UL (ref 0–1)
MONOCYTES NFR BLD: 2 % (ref 5–13)
NEUTS SEG # BLD: 9.8 K/UL (ref 1.8–8)
NEUTS SEG NFR BLD: 94 % (ref 32–75)
NRBC # BLD: 0 K/UL (ref 0–0.01)
NRBC BLD-RTO: 0 PER 100 WBC
PLATELET # BLD AUTO: 169 K/UL (ref 150–400)
PMV BLD AUTO: 10.9 FL (ref 8.9–12.9)
POTASSIUM SERPL-SCNC: 3.9 MMOL/L (ref 3.5–5.1)
PROT SERPL-MCNC: 5.8 G/DL (ref 6.4–8.2)
RBC # BLD AUTO: 4.11 M/UL (ref 4.1–5.7)
SERVICE CMNT-IMP: ABNORMAL
SODIUM SERPL-SCNC: 133 MMOL/L (ref 136–145)
TSH SERPL DL<=0.05 MIU/L-ACNC: 0.47 UIU/ML (ref 0.36–3.74)
WBC # BLD AUTO: 10.5 K/UL (ref 4.1–11.1)

## 2018-11-10 PROCEDURE — 74011250636 HC RX REV CODE- 250/636: Performed by: EMERGENCY MEDICINE

## 2018-11-10 PROCEDURE — 82962 GLUCOSE BLOOD TEST: CPT

## 2018-11-10 PROCEDURE — 85025 COMPLETE CBC W/AUTO DIFF WBC: CPT

## 2018-11-10 PROCEDURE — 65660000000 HC RM CCU STEPDOWN

## 2018-11-10 PROCEDURE — 36415 COLL VENOUS BLD VENIPUNCTURE: CPT

## 2018-11-10 PROCEDURE — 97535 SELF CARE MNGMENT TRAINING: CPT | Performed by: OCCUPATIONAL THERAPIST

## 2018-11-10 PROCEDURE — G8978 MOBILITY CURRENT STATUS: HCPCS | Performed by: PHYSICAL THERAPIST

## 2018-11-10 PROCEDURE — 97165 OT EVAL LOW COMPLEX 30 MIN: CPT | Performed by: OCCUPATIONAL THERAPIST

## 2018-11-10 PROCEDURE — G8979 MOBILITY GOAL STATUS: HCPCS | Performed by: PHYSICAL THERAPIST

## 2018-11-10 PROCEDURE — 84443 ASSAY THYROID STIM HORMONE: CPT

## 2018-11-10 PROCEDURE — 74011250637 HC RX REV CODE- 250/637: Performed by: INTERNAL MEDICINE

## 2018-11-10 PROCEDURE — 97116 GAIT TRAINING THERAPY: CPT | Performed by: PHYSICAL THERAPIST

## 2018-11-10 PROCEDURE — 97161 PT EVAL LOW COMPLEX 20 MIN: CPT | Performed by: PHYSICAL THERAPIST

## 2018-11-10 PROCEDURE — G8987 SELF CARE CURRENT STATUS: HCPCS | Performed by: OCCUPATIONAL THERAPIST

## 2018-11-10 PROCEDURE — 97530 THERAPEUTIC ACTIVITIES: CPT | Performed by: PHYSICAL THERAPIST

## 2018-11-10 PROCEDURE — 74011000258 HC RX REV CODE- 258: Performed by: EMERGENCY MEDICINE

## 2018-11-10 PROCEDURE — 74011636637 HC RX REV CODE- 636/637: Performed by: INTERNAL MEDICINE

## 2018-11-10 PROCEDURE — 74011250636 HC RX REV CODE- 250/636: Performed by: INTERNAL MEDICINE

## 2018-11-10 PROCEDURE — G8988 SELF CARE GOAL STATUS: HCPCS | Performed by: OCCUPATIONAL THERAPIST

## 2018-11-10 PROCEDURE — P9045 ALBUMIN (HUMAN), 5%, 250 ML: HCPCS | Performed by: INTERNAL MEDICINE

## 2018-11-10 PROCEDURE — 80053 COMPREHEN METABOLIC PANEL: CPT

## 2018-11-10 RX ORDER — AMIODARONE HYDROCHLORIDE 200 MG/1
400 TABLET ORAL DAILY
Status: DISCONTINUED | OUTPATIENT
Start: 2018-11-10 | End: 2018-11-13 | Stop reason: HOSPADM

## 2018-11-10 RX ORDER — AMIODARONE HYDROCHLORIDE 200 MG/1
400 TABLET ORAL DAILY
Status: DISCONTINUED | OUTPATIENT
Start: 2018-11-10 | End: 2018-11-10

## 2018-11-10 RX ORDER — AMIODARONE HYDROCHLORIDE 200 MG/1
400 TABLET ORAL DAILY
Status: DISCONTINUED | OUTPATIENT
Start: 2018-11-11 | End: 2018-11-10

## 2018-11-10 RX ADMIN — ALBUMIN (HUMAN) 25 G: 2.5 SOLUTION INTRAVENOUS at 08:00

## 2018-11-10 RX ADMIN — INSULIN LISPRO 2 UNITS: 100 INJECTION, SOLUTION INTRAVENOUS; SUBCUTANEOUS at 17:30

## 2018-11-10 RX ADMIN — INSULIN LISPRO 5 UNITS: 100 INJECTION, SOLUTION INTRAVENOUS; SUBCUTANEOUS at 13:09

## 2018-11-10 RX ADMIN — NATEGLINIDE 60 MG: 120 TABLET, FILM COATED ORAL at 08:03

## 2018-11-10 RX ADMIN — FUROSEMIDE 20 MG: 10 INJECTION, SOLUTION INTRAMUSCULAR; INTRAVENOUS at 17:29

## 2018-11-10 RX ADMIN — AMIODARONE HYDROCHLORIDE 400 MG: 200 TABLET ORAL at 13:08

## 2018-11-10 RX ADMIN — APIXABAN 2.5 MG: 2.5 TABLET, FILM COATED ORAL at 08:04

## 2018-11-10 RX ADMIN — NATEGLINIDE 60 MG: 120 TABLET, FILM COATED ORAL at 13:09

## 2018-11-10 RX ADMIN — LATANOPROST 1 DROP: 50 SOLUTION OPHTHALMIC at 21:33

## 2018-11-10 RX ADMIN — INSULIN LISPRO 3 UNITS: 100 INJECTION, SOLUTION INTRAVENOUS; SUBCUTANEOUS at 08:06

## 2018-11-10 RX ADMIN — NATEGLINIDE 60 MG: 120 TABLET, FILM COATED ORAL at 17:29

## 2018-11-10 RX ADMIN — HYDROCORTISONE SODIUM SUCCINATE 50 MG: 100 INJECTION, POWDER, FOR SOLUTION INTRAMUSCULAR; INTRAVENOUS at 20:48

## 2018-11-10 RX ADMIN — FUROSEMIDE 20 MG: 10 INJECTION, SOLUTION INTRAMUSCULAR; INTRAVENOUS at 08:04

## 2018-11-10 RX ADMIN — APIXABAN 2.5 MG: 2.5 TABLET, FILM COATED ORAL at 17:29

## 2018-11-10 RX ADMIN — AMIODARONE HYDROCHLORIDE 0.5 MG/MIN: 50 INJECTION, SOLUTION INTRAVENOUS at 01:13

## 2018-11-10 RX ADMIN — HYDROCORTISONE SODIUM SUCCINATE 50 MG: 100 INJECTION, POWDER, FOR SOLUTION INTRAMUSCULAR; INTRAVENOUS at 08:03

## 2018-11-10 NOTE — PROGRESS NOTES
Spoke with Dr. Jay Jay Collins regarding patients HR, amio gtt. MD wants to place drip at 0.5mg/min.

## 2018-11-10 NOTE — PROGRESS NOTES
[address] [place] [address] 
[phone] Medical Progress Note NAME: Gideon Riedel :  1931 MRM:  [id number] Date/Time: 11/10/2018  6:59 AM 
  
Subjective:  
 
Admitted with acute respiratory failure, PNA, CHF, rapid afib, hyponatremia, metastatic melanoma and adrenal insufficiency. On amiodarone drip and HR improved. Has diuresed overnight and feels much better with improvement of SOB. Afebrile. Past Medical History:  
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 hodges, radiologist  
 Atrial flutter [facility])   
 s/p cardioversion  Cancer [facility])  LEFT FOOT ,MELANOMA  [facility]  RIGHT KIDNEY  
 Cancer [facility])  PROSTATE  TREATED WITH RADIATION  Chronic pain BACK AND RIGHT HIP  
 Diabetes mellitus, stable ([address] 2017 Steroid induced  Hyperglycemia 2017 Steroid induced: follow up A1C, BMP, discontinue Starlix in wnl  LBP (low back pain)  Left hip pain 2017 Xray shows no obvious Fx or Metastatic lesions, this pain could be arthritic in nature or radicular, will give a corticosteroid injection, if no benefit may need Ortho eval  
 Malignant neoplasm of prostate ([address] 2017  OA (osteoarthritis)  Prostate cancer ([address]  Pure hyperglyceridemia 2017  Renal cell cancer (right)  Small bowel obstruction ([address] 2017 ROS:  General: negative for fever, chills, sweats, weakness Respiratory:  positive for improvement in SOB Cardiology:  negative for chest pain, palpitations, orthopnea, PND, edema, syncope Gastrointestinal: negative for abdominal pain, N/V, dysphagia, change in bowel habits, bleeding Genitourinary: negative for frequency, urgency, dysuria, hematuria, incontinence Muskuloskeletal : negative for arthralgia, myalgia Dermatological: negative for rash, ulceration, mole change, new lesion Neurological: negative for headache, dizziness, confusion, focal weakness, paresthesia, memory loss, gait disturbance Psychological: negative for anxiety, depression, agitation Review of systems otherwise negative Objective:  
 
 
Vitals:  
 
  
Last 24hrs VS reviewed since prior progress note. Most recent are: 
 
Visit Vitals /66 (BP 1 Location: Left arm, BP Patient Position: At rest) Pulse 66 Temp 98.2 °F (36.8 °C) Resp 17 Ht 5' 8\" (1.727 m) Wt 174 lb 2.6 oz (79 kg) SpO2 94% BMI 26.48 kg/m² SpO2 Readings from Last 6 Encounters:  
11/10/18 94% 11/07/18 98% 11/03/18 95% 10/31/18 92% 01/19/18 96% 01/10/18 97% O2 Flow Rate (L/min): 2 l/min Intake/Output Summary (Last 24 hours) at 11/10/2018 5187 Last data filed at 11/10/2018 4208 Gross per 24 hour Intake  Output 1400 ml Net -1400 ml Telemetry reviewed:   AFIB Tubes:   n/a 
X-Ray:  Chest xray - Development of a mild pulmonary edema pattern CT chest -Mild pulmonary edema with an upper lobe distribution. No definite infiltrate. Follow-up to resolution recommended. Procedures:   N/A Exam:  
 
General   Thin 79 y/o male, alert and in NAD Respiratory   Generally clear with eales at right base Cardiology  Slightly irregular without murmur Abdominal  Soft, non-tender, non-distended, positive bowel sounds, no hepatosplenomegaly Extremities  No clubbing, cyanosis, or edema. Pulses intact. Skin  Normal skin turgor. No rashes or skin ulcers noted Neurological  No focal neurological deficits noted Psychological  Oriented x 3. Normal affect. Exam otherwise negative Lab Data Reviewed: (see below) Medications Reviewed: (see below) 
 
______________________________________________________________________ Medications:  
 
Current Facility-Administered Medications Medication Dose Route Frequency  sodium chloride (NS) flush 5-10 mL  5-10 mL IntraVENous PRN  
  amiodarone (CORDARONE) 375 mg in dextrose 5% 250 mL infusion  1 mg/min IntraVENous CONTINUOUS  
 levoFLOXacin (LEVAQUIN) 750 mg in D5W IVPB  750 mg IntraVENous Q48H  
 apixaban (ELIQUIS) tablet 2.5 mg  2.5 mg Oral BID  latanoprost (XALATAN) 0.005 % ophthalmic solution 1 Drop  1 Drop Both Eyes QHS  nateglinide (STARLIX) 120 mg tablet 60 mg  60 mg Oral TIDAC  insulin lispro (HUMALOG) injection   SubCUTAneous AC&HS  
 glucose chewable tablet 16 g  4 Tab Oral PRN  
 dextrose (D50W) injection syrg 12.5-25 g  12.5-25 g IntraVENous PRN  
 glucagon (GLUCAGEN) injection 1 mg  1 mg IntraMUSCular PRN  
 traMADol (ULTRAM) tablet 25-50 mg  25-50 mg Oral Q8H PRN  
 sodium chloride (NS) flush 5-10 mL  5-10 mL IntraVENous Q8H  
 sodium chloride (NS) flush 5-10 mL  5-10 mL IntraVENous PRN  
 acetaminophen (TYLENOL) tablet 650 mg  650 mg Oral Q6H PRN  
 ondansetron (ZOFRAN) injection 4 mg  4 mg IntraVENous Q4H PRN  
 furosemide (LASIX) injection 20 mg  20 mg IntraVENous BID  albumin human 5% (BUMINATE) solution 25 g  25 g IntraVENous ONCE  hydrocortisone Sod Succ (PF) (SOLU-CORTEF) injection 50 mg  50 mg IntraVENous BID Lab Review:  
 
Recent Labs 11/10/18 
0447 11/09/18 
1249 WBC 10.5 12.0*  
HGB 12.2 13.0 HCT 37.2 39.8  170 Recent Labs 11/10/18 
0447 11/09/18 
1249 * 131*  
K 3.9 4.1 CL 99 96* CO2 25 25 * 131* BUN 22* 19  
CREA 1.18 1.33* CA 7.6* 7.8* ALB 2.4* 2.4* TBILI 0.9 1.0  
SGOT 12* 14* ALT 14 17 Lab Results Component Value Date/Time Glucose (POC) 331 (H) 11/09/2018 09:25 PM  
 Glucose (POC) 204 (H) 11/09/2018 06:30 PM  
 Glucose (POC) 197 (H) 11/07/2018 11:28 AM  
 Glucose (POC) 129 (H) 11/07/2018 08:20 AM  
 Glucose (POC) 149 (H) 11/06/2018 09:08 PM  
 
No results for input(s): PH, PCO2, PO2, HCO3, FIO2 in the last 72 hours. No results for input(s): INR in the last 72 hours.  
 
No lab exists for component: INREXT 
 
 
 Assessment:  
 
Principal Problem: 
  Acute respiratory failure with hypoxia ([facility] (11/9/2018) Active Problems: 
  A-fib ([facility] (9/20/2012) Melanoma ([facility] (1/13/2015) CHF (congestive heart failure) ([facility] (8/9/2016) Pneumonia (8/10/2016) Malignant neoplasm of prostate ([facility] (7/13/2017) Adrenal insufficiency (Jose's disease) ([facility] (11/4/2018) Hyponatremia (11/4/2018) Plan:  
 
Risk of deterioration: medium 1. Continue IV diuresis 2. Continue amiodarone 3. On stress doses of steroids 4. IV levaquin for possible PNA 5. SSI coverage 6. Follow labs 7. Appreciate cardiology help Care Plan discussed with: Patient Discussed:  Care Plan Prophylaxis:  Eliquis Disposition:  Home w/Family 
        
___________________________________________________ Attending Physician: Vickie Padilla MD

## 2018-11-10 NOTE — PROGRESS NOTES
Progress Note 11/10/2018 12:38 PM 
NAME: Nirmala Arredondo MRN:  [id number] Admit Diagnosis: Acute respiratory failure with hypoxia ([facility] [address] [address] Acute respiratory failure with hypoxia ([facility] [address] Assessment:  
 
 
Acute hypoxic respiratory failure. Pulmonary edema. Recurrent A Fib eith RVR  
RBBB / [facility] Recent pneumonia. Metastatic melanoma Diabetes Prostate CA Hx Kidney CA Hx of A Flutter with CV  
 
11/10 he has converted back to NSR>    Diuresed well. O2 better feeling better. Plan:  
 
Resume PO Amiodarone. [x]        High complexity decision making was performed Subjective:  
 
Nirmala Arredondo denies chest pain, dyspnea. Discussed with RN events overnight. Patient Active Problem List  
Diagnosis Code  A-fib ([facility]) I48.91  
 Back pain M54.9  Melanoma ([facility] [address] [address] C43.9  Gastroenteritis K52.9  Dehydration E86.0  ARF (acute renal failure) ([facility]) N17.9  CHF (congestive heart failure) ([facility]) I50.9  Pneumonia J18.9  Adrenal insufficiency (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) E27.1  Hyponatremia E87.1  Generalized weakness R53.1  Acute respiratory failure with hypoxia ([facility]) J96.01 Review of Systems: 
 
Symptom Y/N Comments  Symptom Y/N Comments Fever/Chills N   Chest Pain N Poor Appetite N   Edema N   
Cough N   Abdominal Pain N Sputum N   Joint Pain N   
SOB/JARQUIN N   Pruritis/Rash N   
Nausea/vomit N   Tolerating PT/OT Y Diarrhea N   Tolerating Diet Y Constipation N   Other Could NOT obtain due to:   
 
Objective:  
  
Physical Exam: 
 
Last 24hrs VS reviewed since prior progress note. Most recent are: 
 
Visit Vitals /53 Pulse 67  
 Temp 98.3 °F (36.8 °C) Resp 16 Ht 5' 8\" (1.727 m) Wt 79 kg (174 lb 2.6 oz) SpO2 (!) 89% BMI 26.48 kg/m² Intake/Output Summary (Last 24 hours) at 11/10/2018 1238 Last data filed at 11/10/2018 3414 Gross per 24 hour Intake 510 ml Output 1400 ml Net -890 ml General Appearance: Well developed, well nourished, alert & oriented x 3,  
 no acute distress. Ears/Nose/Mouth/Throat: Hearing grossly normal. 
Neck: Supple. Chest: Lungs clear to auscultation bilaterally. Cardiovascular: Regular rate and rhythm, S1S2 normal, no murmur. Abdomen: Soft, non-tender, bowel sounds are active. Extremities: No edema bilaterally. Skin: Warm and dry. PMH/SH reviewed - no change compared to H&P Data Review Telemetry: normal sinus rhythm Lab Data Personally Reviewed: 
 
Recent Labs 11/10/18 
0447 11/09/18 
1249 WBC 10.5 12.0*  
HGB 12.2 13.0 HCT 37.2 39.8  170 LABRCNT(INR:3,PTP:3,APTT:3,) Recent Labs 11/10/18 
0447 11/09/18 
1249 * 131*  
K 3.9 4.1 CL 99 96* CO2 25 25 BUN 22* 19  
CREA 1.18 1.33* * 131* CA 7.6* 7.8* LABRCNT(CPK:3,CpKMB:3,ckndx:3,troiq:3)No results found for: CHOL, CHOLX, CHLST, CHOLV, HDL, LDL, LDLC, DLDLP, TGLX, TRIGL, TRIGP, CHHD, CHHDXLABRCNT(sgot:3,gpt:3,ap:3,tbiL:3,TP:3,ALB:3,GLOB:3,ggt:3,aml:3,amyp:3,lpse:3,hlpse:3)No results for input(s): PH, PCO2, PO2 in the last 72 hours. No results found for: CHOL, CHOLX, CHLST, CHOLV, HDL, LDL, LDLC, DLDLP, TGLX, TRIGL, TRIGP, CHHD, CHHDXMEDTABLETimjose Jaramillo MD 
No results for input(s): PH, PCO2, PO2 in the last 72 hours. Medications Personally Reviewed: 
 
Current Facility-Administered Medications Medication Dose Route Frequency  [START ON 11/11/2018] amiodarone (CORDARONE) tablet 400 mg  400 mg Oral DAILY  sodium chloride (NS) flush 5-10 mL  5-10 mL IntraVENous PRN  
 levoFLOXacin (LEVAQUIN) 750 mg in D5W IVPB  750 mg IntraVENous Q48H  apixaban (ELIQUIS) tablet 2.5 mg  2.5 mg Oral BID  latanoprost (XALATAN) 0.005 % ophthalmic solution 1 Drop  1 Drop Both Eyes QHS  nateglinide (STARLIX) 120 mg tablet 60 mg  60 mg Oral TIDAC  insulin lispro (HUMALOG) injection   SubCUTAneous AC&HS  
 glucose chewable tablet 16 g  4 Tab Oral PRN  
 dextrose (D50W) injection syrg 12.5-25 g  12.5-25 g IntraVENous PRN  
 glucagon (GLUCAGEN) injection 1 mg  1 mg IntraMUSCular PRN  
 traMADol (ULTRAM) tablet 25-50 mg  25-50 mg Oral Q8H PRN  
 sodium chloride (NS) flush 5-10 mL  5-10 mL IntraVENous Q8H  
 sodium chloride (NS) flush 5-10 mL  5-10 mL IntraVENous PRN  
 acetaminophen (TYLENOL) tablet 650 mg  650 mg Oral Q6H PRN  
 ondansetron (ZOFRAN) injection 4 mg  4 mg IntraVENous Q4H PRN  
 furosemide (LASIX) injection 20 mg  20 mg IntraVENous BID  hydrocortisone Sod Succ (PF) (SOLU-CORTEF) injection 50 mg  50 mg IntraVENous BID Drake Solis MD

## 2018-11-10 NOTE — PROGRESS NOTES
Problem: Self Care Deficits Care Plan (Adult) Goal: *Acute Goals and Plan of Care (Insert Text) Occupational Therapy Goals: 
Initiated 11/10/2018 1. Patient will perform grooming standing with modified independence within 7 days. 2. Patient will perform toileting with modified independence within 7 days. 3. Patient will perform upper body dressing and lower body dressing with modified independence within 7 days. 4. Patient will perform one light IADL task in standing with modified independence within 7 days. 5. Patient will transfer from toilet with modified independence using the least restrictive device and appropriate durable medical equipment within 7 days. Occupational Therapy EVALUATION Patient: Jamal Crow (10 y.o. male) Date: 11/10/2018 Primary Diagnosis: Acute respiratory failure with hypoxia (HCC) Acute respiratory failure with hypoxia ([facility] [address] Precautions:   Bed Alarm, Fall ASSESSMENT : 
Based on the objective data described below, the patient presents with SOB on exertion with O2 sat at rest on room air of 100% and with activity 96%. Pt reports recent multiple admits due to SOB. Pt had just started with home health PT services and ambulates with rolling walker. Pt was seated at bedside chair upon arrival.  CGA with increased time for sit to stand and for mobility to bathroom managing door. Performed toilet transfer with CGA and verbal cues for safety. Able to laterally weight shift seated on toilet and perform BM clean up. Stood at sink with CGA to wash hands. Mobilized around room with RW with CGA and fatigue. Pt needed min assist for donning socks seated. Pt may benefit from AE training. Recommend home health at discharge. Patient will benefit from skilled intervention to address the above impairments. Patients rehabilitation potential is considered to be Good Factors which may influence rehabilitation potential include:  
[x]             None noted []             Mental ability/status []             Medical condition []             Home/family situation and support systems []             Safety awareness []             Pain tolerance/management 
[]             Other: PLAN : 
Recommendations and Planned Interventions: 
[x]               Self Care Training                  [x]        Therapeutic Activities [x]               Functional Mobility Training    []        Cognitive Retraining 
[x]               Therapeutic Exercises           []        Endurance Activities [x]               Balance Training                   []        Neuromuscular Re-Education []               Visual/Perceptual Training     [x]   Home Safety Training 
[x]               Patient Education                 [x]        Family Training/Education []               Other (comment): Frequency/Duration: Patient will be followed by occupational therapy 4 times a week to address goals. Discharge Recommendations: Home Health Further Equipment Recommendations for Discharge: none SUBJECTIVE:  
Patient stated Loreta Ray had a hard time getting out of bed at home. I need to work on that and my strength.   Educated on log roll technique OBJECTIVE DATA SUMMARY:  
HISTORY:  
Past Medical History:  
Diagnosis Date  [facility]/14 BRAIN RECENT DX , followed by dr. hodges, radiologist  
 Atrial flutter [facility])   
 s/p cardioversion  Cancer [facility]) 2013 LEFT FOOT ,MELANOMA  Cancer ([facility] [address] 1980 RIGHT KIDNEY  
 Cancer [facility]) 1988 PROSTATE  TREATED WITH RADIATION  Chronic pain BACK AND RIGHT HIP  
 Diabetes mellitus, stable ([facility] [address] 7/13/2017 Steroid induced  Hyperglycemia 7/13/2017 Steroid induced: follow up A1C, BMP, discontinue Starlix in wnl  LBP (low back pain)  Left hip pain 7/13/2017  Xray shows no obvious Fx or Metastatic lesions, this pain could be arthritic in nature or radicular, will give a corticosteroid injection, if no benefit may need Ortho eval  
 Malignant neoplasm of prostate ([facility] [address] 7/13/2017  OA (osteoarthritis)  Prostate cancer ([facility] [address]  Pure hyperglyceridemia 7/13/2017  Renal cell cancer (right)  Small bowel obstruction ([facility] [address] 7/13/2017 Past Surgical History:  
Procedure Laterality Date [address] [facility] [address] MITRAL VALVE REPAIR  
 [address]  HX CATARACT REMOVAL Bilateral 2011  HX LUMBAR LAMINECTOMY  1990  
 HX ORTHOPAEDIC    
 CARPAL TUNNEL-SERAFIN  HX OTHER SURGICAL  OCT 2014 MELANOMA REMOVED FROM HEEL  
 [address] TURP  
 HX UROLOGICAL Right NEPHRECTOMY  LA COLSC FLX W/REMOVAL LESION BY HOT BX FORCEPS  9/24/2012  LA SIGMOIDOSCOPY FLX DX W/COLLJ SPEC BR/WA IF PFRMD  9/21/2012 Prior Level of Function/Environment/Context: recent multiple admits to hospital, performed ADLS on his own, would use shower chair or lean on shower wall for support to bathe (shower is small); uses electric cart in stores; ambulated with Rolling walker with 3 wheels or RW in home Expanded or extensive additional review of patient history:  
 
Home Situation Home Environment: Private residence # Steps to Enter: 3 Rails to Enter: Yes Hand Rails : Bilateral(wide) One/Two Story Residence: One story Living Alone: No 
Support Systems: Spouse/Significant Other/Partner, Family member(s)(wife and pt live together) Patient Expects to be Discharged to[de-identified] Private residence Current DME Used/Available at Home: Shower chair, Walker, rollator, Walker, rolling(3 wheeled rolling walker; hand held shower) Hand dominance: RightEXAMINATION OF PERFORMANCE DEFICITS: 
Cognitive/Behavioral Status: 
Neurologic State: Alert Orientation Level: Oriented X4 Cognition: Appropriate for age attention/concentration; Appropriate safety awareness; Appropriate decision making Perception: Appears intact Perseveration: No perseveration noted Safety/Judgement: Awareness of environment; Fall prevention;Home safety Hearing: Auditory Auditory Impairment: NoneVision/Perceptual:   
Tracking: Able to track stimulus in all quadrants w/o difficulty Acuity: Within Defined Limits Range of Motion: 
 
AROM: Generally decreased, functional 
  
  
  
  
  
  
  
Strength: 
 
Strength: Generally decreased, functional 
  
  
  
 Coordination: 
Coordination: Within functional limits Fine Motor Skills-Upper: Left Intact; Right Intact Gross Motor Skills-Upper: Left Intact; Right Intact Tone & Sensation: 
 
Tone: Normal 
Sensation: Intact Balance: 
Sitting: Intact Standing: Impaired Standing - Static: Constant support;Good Standing - Dynamic : FairFunctional Mobility and Transfers for ADLs:Bed Mobility: 
Rolling: (seated at bedside chair upon arrival) Scooting: Supervision Transfers: 
Sit to Stand: Contact guard assistance; Additional time Stand to Sit: Contact guard assistance; Additional time(verbal cues for safety) Bed to Chair: Contact guard assistance(with rolling walker) Bathroom Mobility: Contact guard assistance(with rolling walker) Toilet Transfer : Contact guard assistance; Additional time(verbal cues for safety) ADL Assessment: 
Feeding: Independent Oral Facial Hygiene/Grooming: Contact guard assistance(standing) Bathing: Minimum assistance(LB) Upper Body Dressing: Setup Lower Body Dressing: Minimum assistance Toileting: Contact guard assistance ADL Intervention and task modifications: 
See assessment Cognitive Retraining Safety/Judgement: Awareness of environment; Fall prevention;Home safety Functional Measure: 
Barthel Index: 
 
Bathin Bladder: 5 Bowels: 10 
Groomin Dressin Feeding: 10 Mobility: 10 Stairs: 5 Toilet Use: 5 Transfer (Bed to Chair and Back): 10 Total: 65 Barthel and G-code impairment scale: 
Percentage of impairment [address] [place] [address] 
 0% CI 
1-19% CJ 
20-39% CK 
40-59% CL 
60-79% CM 
80-99% CN 
100% Barthel Score 0-100 100 99-80 79-60 59-40 20-39 1-19 
 0 Barthel Score 0-20 20 17-19 13-16 9-12 5-8 1-4 0 The Barthel ADL Index: Guidelines 1. The index should be used as a record of what a patient does, not as a record of what a patient could do. 2. The main aim is to establish degree of independence from any help, physical or verbal, however minor and for whatever reason. 3. The need for supervision renders the patient not independent. 4. A patient's performance should be established using the best available evidence. Asking the patient, friends/relatives and nurses are the usual sources, but direct observation and common sense are also important. However direct testing is not needed. 5. Usually the patient's performance over the preceding 24-48 hours is important, but occasionally longer periods will be relevant. 6. Middle categories imply that the patient supplies over 50 per cent of the effort. 7. Use of aids to be independent is allowed. Kathleen Oliva., Barthel, DJENNIFER. (0247). Functional evaluation: the Barthel Index. [facility] (14)2. Itz Espino jet KOLBY Keyes, Lena Del Valle., Rayray Perez., Juliano, [address] [place] [address] (1999). Measuring the change indisability after inpatient rehabilitation; comparison of the responsiveness of the Barthel Index and Functional [place] Measure. Journal of Neurology, Neurosurgery, and Psychiatry, 66(4), 445-413. Laney Louis, [place]MARICEL, BIBIANA Nesbitt, & Robin Sanders MLizA. (2004.) Assessment of post-stroke quality of life in cost-effectiveness studies: The usefulness of the Barthel Index and the EuroQoL-5D. [facility], 84, 692-44 G codes: In compliance with CMSs Claims Based Outcome Reporting, the following G-code set was chosen for this patient based on their primary functional limitation being treated:  
 
The outcome measure chosen to determine the severity of the functional limitation was the barthel with a score of 65/100 which was correlated with the impairment scale. ? Self Care:  
  - CURRENT STATUS: CJ - 20%-39% impaired, limited or restricted  - GOAL STATUS: CI - 1%-19% impaired, limited or restricted  - D/C STATUS:  ---------------To be determined--------------- Occupational Therapy Evaluation Charge Determination History Examination Decision-Making MEDIUM Complexity : Expanded review of history including physical, cognitive and psychosocial  history  LOW Complexity : 1-3 performance deficits relating to physical, cognitive , or psychosocial skils that result in activity limitations and / or participation restrictions  MEDIUM Complexity : Patient may present with comorbidities that affect occupational performnce. Miniml to moderate modification of tasks or assistance (eg, physical or verbal ) with assesment(s) is necessary to enable patient to complete evaluation Based on the above components, the patient evaluation is determined to be of the following complexity level: LOW Pain: 
Pain Scale 1: Numeric (0 - 10) Pain Intensity 1: 0 Activity Tolerance:  
 
Please refer to the flowsheet for vital signs taken during this treatment. After treatment:  
[x] Patient left in no apparent distress sitting up in chair 
[] Patient left in no apparent distress in bed 
[x] Call bell left within reach [x] Nursing notified 
[] Caregiver present [x] Bed alarm activated COMMUNICATION/EDUCATION:  
The patients plan of care was discussed with: Registered Nurse and patient and PT. [x] Home safety education was provided and the patient/caregiver indicated understanding. [x] Patient have participated as able in goal setting and plan of care. [x] Patient agree to work toward stated goals and plan of care. [] Patient understands intent and goals of therapy, but is neutral about his/her participation. [] Patient is unable to participate in goal setting and plan of care. This patients plan of care is appropriate for delegation to SHANEL. Thank you for this referral. 
Chai Renee, OTR/L Time Calculation: 23 mins

## 2018-11-10 NOTE — PROGRESS NOTES
Pt seen today in ED for readmission after discharge to home 48 hours ago. Initially patient did well. endorses compliancy with meds. , including ATB, Levaquin. Starting yesterday he became increasingly dyspneic and developed a fever. Son who talked to him on the phone stated he sounded confused. he was due today to follow up with PCP. Instead activated EMS and was transferred to [facility]. Upon arrival EMS documents SATs noted in 70%,  but was afebrile. He will be readmitted. CBC Lab Results Component Value Date/Time WBC 12.0 (H) 11/09/2018 12:49 PM  
 HGB 13.0 11/09/2018 12:49 PM  
 HCT 39.8 11/09/2018 12:49 PM  
 PLATELET 817 57/36/6882 12:49 PM  
 MCV 91.3 11/09/2018 12:49 PM  
 
Mild increase in WBCs noted. Lipids No results found for: CHOL, CHOLX, CHLST, [address], [id number], HDL, LDL, LDLC, DLDLP, TGLX, TRIGL, TRIGP, CHHD, CHHDX Liver Panel Lab Results Component Value Date/Time Albumin 2.4 (L) 11/09/2018 12:49 PM  
 Protein, total 6.2 (L) 11/09/2018 12:49 PM  
 Globulin 3.8 11/09/2018 12:49 PM  
 A-G Ratio 0.6 (L) 11/09/2018 12:49 PM  
 AST (SGOT) 14 (L) 11/09/2018 12:49 PM  
 AST (POC) 19 01/10/2018 10:35 AM  
 ALT (POC) 22 01/10/2018 10:35 AM  
 ALT (SGPT) 17 11/09/2018 12:49 PM  
 Alk. phosphatase 59 11/09/2018 12:49 PM  
 
 
Renal 
Lab Results Component Value Date/Time GFR est AA >60 11/09/2018 12:49 PM  
 GFR est non-AA 51 (L) 11/09/2018 12:49 PM  
 Creatinine 1.33 (H) 11/09/2018 12:49 PM  
 BUN 19 11/09/2018 12:49 PM  
 Sodium 131 (L) 11/09/2018 12:49 PM  
 Potassium 4.1 11/09/2018 12:49 PM  
 Chloride 96 (L) 11/09/2018 12:49 PM  
 CO2 25 11/09/2018 12:49 PM  
 
 
INR Lab Results Component Value Date/Time INR 1.0 10/31/2018 03:52 PM  
 INR 1.2 (H) 08/14/2016 03:41 AM  
 INR 1.1 09/21/2012 02:00 AM  
 Prothrombin time 10.5 10/31/2018 03:52 PM  
 Prothrombin time 12.4 (H) 08/14/2016 03:41 AM  
 Prothrombin time 11.4 09/21/2012 02:00 AM  
 
 Ct Chest Wo Cont Result Date: 11/9/2018 IMPRESSION: Mild pulmonary edema with an upper lobe distribution. No definite infiltrate. Follow-up to resolution recommended. Xr Chest [facility] Result Date: 11/9/2018 IMPRESSION: Development of a mild pulmonary edema pattern. Physical Exam  
Constitutional: He is oriented to person, place, and time and well-developed, well-nourished, and in no distress. HENT:  
Head: Normocephalic. Neck: Normal range of motion. Cardiovascular: Normal rate. Pulmonary/Chest: He has rales. Abdominal: Soft. Bowel sounds are normal.  
Musculoskeletal: Normal range of motion. Neurological: He is alert and oriented to person, place, and time. Skin: Skin is warm.   
Psychiatric: Mood, affect and judgment normal.  
O2 on at 2LPM NC

## 2018-11-10 NOTE — PROGRESS NOTES
Physical Therapy Goals Initiated 11/10/2018 1. Patient will move from supine to sit and sit to supine , scoot up and down and roll side to side in bed with independence within 7 day(s). 2.  Patient will transfer from bed to chair and chair to bed with modified independence using the least restrictive device within 7 day(s). 3.  Patient will perform sit to stand with modified independence within 7 day(s). 4.  Patient will ambulate with modified independence for 250 feet with the least restrictive device within 7 day(s). 5.  Patient will ascend/descend 4 stairs with 1 handrail(s) with supervision/set-up within 7 day(s). physical Therapy EVALUATION Patient: Vik Waters (35 y.o. male) Date: 11/10/2018 Primary Diagnosis: Acute respiratory failure with hypoxia (HCC) Acute respiratory failure with hypoxia ([address] Precautions:   Bed Alarm, Fall ASSESSMENT : 
Based on the objective data described below, the patient presents with generalized weakness and impaired functional mobility, balance and endurance which limits functional independence. Patient's O2 sats and HR remained stable throughout tx session. Patient requiring min A for transfers and CGA for ambulation. Gait is slightly unsteady using RW for support with occasional mild balance checks but no overt LOB noted. Patient recently discharged from hospital. He reports modified independence with all mobility and uses a RW at baseline. Recommend return to home with HHPT following discharge. Patient will benefit from skilled intervention to address the above impairments. Patients rehabilitation potential is considered to be Good Factors which may influence rehabilitation potential include:  
[x]         None noted 
[]         Mental ability/status []         Medical condition 
[]         Home/family situation and support systems 
[]         Safety awareness 
[]         Pain tolerance/management 
[]         Other: PLAN : 
 Recommendations and Planned Interventions: 
[x]           Bed Mobility Training             []    Neuromuscular Re-Education 
[x]           Transfer Training                   []    Orthotic/Prosthetic Training 
[x]           Gait Training                         []    Modalities [x]           Therapeutic Exercises           []    Edema Management/Control 
[x]           Therapeutic Activities            [x]    Patient and Family Training/Education 
[]           Other (comment): Frequency/Duration: Patient will be followed by physical therapy  4 times a week to address goals. Discharge Recommendations: Home Health Further Equipment Recommendations for Discharge: has appropriate equipment SUBJECTIVE:  
Patient stated I'm feeling much better today.  OBJECTIVE DATA SUMMARY:  
HISTORY:   
Past Medical History:  
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 hodges, radiologist  
 Atrial flutter [facility])   
 s/p cardioversion  Cancer [facility]) 2013 LEFT FOOT ,MELANOMA  Cancer ([facility] 1980 RIGHT KIDNEY  
 Cancer [facility]) 1988 PROSTATE  TREATED WITH RADIATION  Chronic pain BACK AND RIGHT HIP  
 Diabetes mellitus, stable ([facility] 7/13/2017 Steroid induced  Hyperglycemia 7/13/2017 Steroid induced: follow up A1C, BMP, discontinue Starlix in wnl  LBP (low back pain)  Left hip pain 7/13/2017 Xray shows no obvious Fx or Metastatic lesions, this pain could be arthritic in nature or radicular, will give a corticosteroid injection, if no benefit may need Ortho eval  
 Malignant neoplasm of prostate ([address] 7/13/2017  OA (osteoarthritis)  Prostate cancer ([address]  Pure hyperglyceridemia 7/13/2017  Renal cell cancer (right)  Small bowel obstruction ([address] 7/13/2017 Past Surgical History:  
Procedure Laterality Date [facility] 635 MITRAL VALVE REPAIR  
 [address]  HX CATARACT REMOVAL Bilateral 2011  HX LUMBAR LAMINECTOMY  1990  
 HX ORTHOPAEDIC    
 CARPAL TUNNEL-SERAFIN  HX OTHER SURGICAL  OCT 2014 MELANOMA REMOVED FROM HEEL  
 [address] TURP  
 HX UROLOGICAL Right NEPHRECTOMY  NV COLSC FLX W/REMOVAL LESION BY HOT BX FORCEPS  9/24/2012  NV SIGMOIDOSCOPY FLX DX W/COLLJ SPEC BR/WA IF PFRMD  9/21/2012 Prior Level of Function/Home Situation: patient reports modified independence with overall mobility and ADLs; uses RW for ambulation; recently discharged from hospital and reports fall 2 weeks ago Home Situation Home Environment: Private residence # Steps to Enter: 3 Rails to Enter: Yes Hand Rails : Bilateral(wide) One/Two Story Residence: One story Living Alone: No 
Support Systems: Spouse/Significant Other/Partner, Family member(s)(wife and pt live together) Patient Expects to be Discharged to[de-identified] Private residence Current DME Used/Available at Home: Shower chair, Walker, rollator, Walker, rolling(3 wheeled rolling walker; hand held shower) EXAMINATION/PRESENTATION/DECISION MAKING: Critical Behavior: 
Neurologic State: Alert Orientation Level: Oriented X4 Cognition: Appropriate for age attention/concentration, Appropriate safety awareness, Appropriate decision making Safety/Judgement: Awareness of environment, Fall prevention, Home safety Hearing: Auditory Auditory Impairment: None Range Of Motion: 
AROM: Generally decreased, functional 
  
  
  
  
  
  
  
Strength:   
Strength: Generally decreased, functional 
  
  
  
  
  
  
Tone & Sensation:  
Tone: Normal 
  
  
  
  
Sensation: Intact Coordination: 
Coordination: Within functional limits Vision:  
Tracking: Able to track stimulus in all quadrants w/o difficulty Acuity: Within Defined Limits Functional Mobility: 
Bed Mobility: 
Rolling: (seated at bedside chair upon arrival) Scooting: Supervision Transfers: 
Sit to Stand: Minimum assistance Stand to Sit: Minimum assistance Bed to Chair: Contact guard assistance(with rolling walker) Balance:  
Sitting: Intact Standing: Impaired Standing - Static: Constant support;Good Standing - Dynamic : Fair Ambulation/Gait Training: 
Distance (ft): 150 Feet (ft) Assistive Device: Walker, rolling;Gait belt Ambulation - Level of Assistance: Contact guard assistance Gait Abnormalities: Decreased step clearance Base of Support: Widened Speed/Tammi: Pace decreased (<100 feet/min); Shuffled; Slow Step Length: Left shortened;Right shortened Gait is slow and slightly unsteady with occasional mild balance checks but no overt LOB noted Functional Measure: 
Barthel Index: 
 
Bathin Bladder: 5 Bowels: 10 
Groomin Dressin Feeding: 10 Mobility: 10 Stairs: 5 Toilet Use: 5 Transfer (Bed to Chair and Back): 10 Total: 65 Barthel and G-code impairment scale: 
Percentage of impairment CH 
0% CI 
1-19% CJ 
20-39% CK 
40-59% CL 
60-79% CM 
80-99% CN 
100% Barthel Score 0-100 100 99-80 79-60 59-40 20-39 1-19 
 0 Barthel Score 0-20 20 17-19 13-16 9-12 5-8 1-4 0 The Barthel ADL Index: Guidelines 1. The index should be used as a record of what a patient does, not as a record of what a patient could do. 2. The main aim is to establish degree of independence from any help, physical or verbal, however minor and for whatever reason. 3. The need for supervision renders the patient not independent. 4. A patient's performance should be established using the best available evidence. Asking the patient, friends/relatives and nurses are the usual sources, but direct observation and common sense are also important. However direct testing is not needed. 5. Usually the patient's performance over the preceding 24-48 hours is important, but occasionally longer periods will be relevant.  
6. Middle categories imply that the patient supplies over 50 per cent of the effort. 7. Use of aids to be independent is allowed. Bebeto Han., Barthel, D.W. (8579). Functional evaluation: the Barthel Index. [address] (14)2. KOLBY Lao Toma Side., Allyslaura Briceer., Juliano, [address] (1999). Measuring the change indisability after inpatient rehabilitation; comparison of the responsiveness of the Barthel Index and Functional Warriors Mark Measure. Journal of Neurology, Neurosurgery, and Psychiatry, 66(4), 238-922. ANNETTA Brown, BIBIANA Nesbitt, & Ravinder Ham M.A. (2004.) Assessment of post-stroke quality of life in cost-effectiveness studies: The usefulness of the Barthel Index and the EuroQoL-5D. [facility], 28, 801-29 G codes: In compliance with CMSs Claims Based Outcome Reporting, the following G-code set was chosen for this patient based on their primary functional limitation being treated: The outcome measure chosen to determine the severity of the functional limitation was the Barthel with a score of 65/100 which was correlated with the impairment scale. ? Mobility - Walking and Moving Around:  
  - CURRENT STATUS: CJ - 20%-39% impaired, limited or restricted  - GOAL STATUS: CI - 1%-19% impaired, limited or restricted  - D/C STATUS:  ---------------To be determined---------------  
  
 
 
Pain: 
Pain Scale 1: Numeric (0 - 10) Pain Intensity 1: 0 Activity Tolerance:  
VSS; O2 sats 93% on RA during activity Please refer to the flowsheet for vital signs taken during this treatment. After treatment:  
[x]         Patient left in no apparent distress sitting up in chair 
[]         Patient left in no apparent distress in bed 
[x]         Call bell left within reach [x]         Nursing notified 
[x]         Caregiver present 
[]         Bed alarm activated COMMUNICATION/EDUCATION:  
The patients plan of care was discussed with: Occupational Therapist and Registered Nurse. [x]         Fall prevention education was provided and the patient/caregiver indicated understanding. [x]         Patient/family have participated as able in goal setting and plan of care. [x]         Patient/family agree to work toward stated goals and plan of care. []         Patient understands intent and goals of therapy, but is neutral about his/her participation. []         Patient is unable to participate in goal setting and plan of care. Thank you for this referral. 
Dahlia Albright, PT Time Calculation: 27 mins

## 2018-11-11 LAB
ANION GAP SERPL CALC-SCNC: 9 MMOL/L (ref 5–15)
BUN SERPL-MCNC: 30 MG/DL (ref 6–20)
BUN/CREAT SERPL: 24 (ref 12–20)
CALCIUM SERPL-MCNC: 7.4 MG/DL (ref 8.5–10.1)
CHLORIDE SERPL-SCNC: 103 MMOL/L (ref 97–108)
CO2 SERPL-SCNC: 24 MMOL/L (ref 21–32)
CREAT SERPL-MCNC: 1.24 MG/DL (ref 0.7–1.3)
ERYTHROCYTE [DISTWIDTH] IN BLOOD BY AUTOMATED COUNT: 14.1 % (ref 11.5–14.5)
GLUCOSE BLD STRIP.AUTO-MCNC: 175 MG/DL (ref 65–100)
GLUCOSE BLD STRIP.AUTO-MCNC: 180 MG/DL (ref 65–100)
GLUCOSE BLD STRIP.AUTO-MCNC: 232 MG/DL (ref 65–100)
GLUCOSE BLD STRIP.AUTO-MCNC: 301 MG/DL (ref 65–100)
GLUCOSE SERPL-MCNC: 227 MG/DL (ref 65–100)
HCT VFR BLD AUTO: 35.5 % (ref 36.6–50.3)
HGB BLD-MCNC: 11.8 G/DL (ref 12.1–17)
MCH RBC QN AUTO: 30.2 PG (ref 26–34)
MCHC RBC AUTO-ENTMCNC: 33.2 G/DL (ref 30–36.5)
MCV RBC AUTO: 90.8 FL (ref 80–99)
NRBC # BLD: 0 K/UL (ref 0–0.01)
NRBC BLD-RTO: 0 PER 100 WBC
PLATELET # BLD AUTO: 198 K/UL (ref 150–400)
PMV BLD AUTO: 11 FL (ref 8.9–12.9)
POTASSIUM SERPL-SCNC: 3.9 MMOL/L (ref 3.5–5.1)
RBC # BLD AUTO: 3.91 M/UL (ref 4.1–5.7)
SERVICE CMNT-IMP: ABNORMAL
SODIUM SERPL-SCNC: 136 MMOL/L (ref 136–145)
WBC # BLD AUTO: 9.4 K/UL (ref 4.1–11.1)

## 2018-11-11 PROCEDURE — 82962 GLUCOSE BLOOD TEST: CPT

## 2018-11-11 PROCEDURE — 36415 COLL VENOUS BLD VENIPUNCTURE: CPT

## 2018-11-11 PROCEDURE — 74011250636 HC RX REV CODE- 250/636: Performed by: INTERNAL MEDICINE

## 2018-11-11 PROCEDURE — 85027 COMPLETE CBC AUTOMATED: CPT

## 2018-11-11 PROCEDURE — 74011636637 HC RX REV CODE- 636/637: Performed by: INTERNAL MEDICINE

## 2018-11-11 PROCEDURE — 74011250637 HC RX REV CODE- 250/637: Performed by: INTERNAL MEDICINE

## 2018-11-11 PROCEDURE — 65660000000 HC RM CCU STEPDOWN

## 2018-11-11 PROCEDURE — 80048 BASIC METABOLIC PNL TOTAL CA: CPT

## 2018-11-11 RX ORDER — HYDROCORTISONE SODIUM SUCCINATE 100 MG/2ML
25 INJECTION, POWDER, FOR SOLUTION INTRAMUSCULAR; INTRAVENOUS 2 TIMES DAILY
Status: DISCONTINUED | OUTPATIENT
Start: 2018-11-11 | End: 2018-11-12

## 2018-11-11 RX ADMIN — APIXABAN 2.5 MG: 2.5 TABLET, FILM COATED ORAL at 17:36

## 2018-11-11 RX ADMIN — NATEGLINIDE 60 MG: 120 TABLET, FILM COATED ORAL at 08:33

## 2018-11-11 RX ADMIN — NATEGLINIDE 60 MG: 120 TABLET, FILM COATED ORAL at 12:35

## 2018-11-11 RX ADMIN — FUROSEMIDE 20 MG: 10 INJECTION, SOLUTION INTRAMUSCULAR; INTRAVENOUS at 17:36

## 2018-11-11 RX ADMIN — Medication 5 ML: at 14:00

## 2018-11-11 RX ADMIN — HYDROCORTISONE SODIUM SUCCINATE 25 MG: 100 INJECTION, POWDER, FOR SOLUTION INTRAMUSCULAR; INTRAVENOUS at 21:06

## 2018-11-11 RX ADMIN — INSULIN LISPRO 3 UNITS: 100 INJECTION, SOLUTION INTRAVENOUS; SUBCUTANEOUS at 16:30

## 2018-11-11 RX ADMIN — AMIODARONE HYDROCHLORIDE 400 MG: 200 TABLET ORAL at 08:33

## 2018-11-11 RX ADMIN — FUROSEMIDE 20 MG: 10 INJECTION, SOLUTION INTRAMUSCULAR; INTRAVENOUS at 08:34

## 2018-11-11 RX ADMIN — LATANOPROST 1 DROP: 50 SOLUTION OPHTHALMIC at 21:06

## 2018-11-11 RX ADMIN — APIXABAN 2.5 MG: 2.5 TABLET, FILM COATED ORAL at 08:33

## 2018-11-11 RX ADMIN — INSULIN LISPRO 2 UNITS: 100 INJECTION, SOLUTION INTRAVENOUS; SUBCUTANEOUS at 07:30

## 2018-11-11 RX ADMIN — INSULIN LISPRO 7 UNITS: 100 INJECTION, SOLUTION INTRAVENOUS; SUBCUTANEOUS at 12:36

## 2018-11-11 RX ADMIN — HYDROCORTISONE SODIUM SUCCINATE 25 MG: 100 INJECTION, POWDER, FOR SOLUTION INTRAMUSCULAR; INTRAVENOUS at 08:34

## 2018-11-11 RX ADMIN — LEVOFLOXACIN 750 MG: 5 INJECTION, SOLUTION INTRAVENOUS at 17:36

## 2018-11-11 RX ADMIN — NATEGLINIDE 60 MG: 120 TABLET, FILM COATED ORAL at 17:35

## 2018-11-11 NOTE — PROGRESS NOTES
Progress Note 11/11/2018 12:38 PM 
NAME: Vik Waters MRN:  [id number] Admit Diagnosis: Acute respiratory failure with hypoxia ([facility] Acute respiratory failure with hypoxia ([facility] [facility] Assessment:  
 
 
Acute hypoxic respiratory failure. Pulmonary edema. Recurrent A Fib eith RVR  
RBBB / [facility] Recent pneumonia. Metastatic melanoma Diabetes Prostate CA Hx Kidney CA Hx of A Flutter with CV  
 
11/10 he has converted back to NSR>    Diuresed well. O2 better feeling better. 11/11 Parox A fib noted. Continue with Amiodarone. Plan:  
 
Resume PO Amiodarone. [x]        High complexity decision making was performed Subjective:  
 
Vik Waters denies chest pain, dyspnea. Discussed with RN events overnight. Patient Active Problem List  
Diagnosis Code  A-fib ([facility]) I48.91  
 Back pain M54.9  Melanoma ([facility] C43.9  Gastroenteritis K52.9  Dehydration E86.0  ARF (acute renal failure) ([facility]) N17.9  CHF (congestive heart failure) ([facility]) I50.9  Pneumonia J18.9  Adrenal insufficiency ([facility]) E27.40  Small bowel obstruction ([facility] Z49.717  Diabetes mellitus, stable (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 C61  Left hip pain M25.552  Adrenal insufficiency ([place]'s disease) ([facility]) E27.1  Hyponatremia E87.1  Generalized weakness R53.1  Acute respiratory failure with hypoxia ([facility]) J96.01 Review of Systems: 
 
Symptom Y/N Comments  Symptom Y/N Comments Fever/Chills N   Chest Pain N Poor Appetite N   Edema N   
Cough N   Abdominal Pain N Sputum N   Joint Pain N   
SOB/JARQUIN N   Pruritis/Rash N   
Nausea/vomit N   Tolerating PT/OT Y Diarrhea N   Tolerating Diet Y Constipation N   Other Could NOT obtain due to:   
 
Objective:  
  
Physical Exam: Last 24hrs VS reviewed since prior progress note. Most recent are: 
 
Visit Vitals BP 99/55 (BP 1 Location: Right arm, BP Patient Position: At rest) Pulse 74 Temp 97.5 °F (36.4 °C) Resp 16 Ht 5' 8\" (1.727 m) Wt 80.3 kg (177 lb 0.5 oz) SpO2 97% BMI 26.92 kg/m² Intake/Output Summary (Last 24 hours) at 11/11/2018 1539 Last data filed at 11/11/2018 1409 Gross per 24 hour Intake 480 ml Output 1000 ml Net -520 ml General Appearance: Well developed, well nourished, alert & oriented x 3,  
 no acute distress. Ears/Nose/Mouth/Throat: Hearing grossly normal. 
Neck: Supple. Chest: Lungs clear to auscultation bilaterally. Cardiovascular: Regular rate and rhythm, S1S2 normal, no murmur. Abdomen: Soft, non-tender, bowel sounds are active. Extremities: No edema bilaterally. Skin: Warm and dry. PMH/SH reviewed - no change compared to H&P Data Review Telemetry: normal sinus rhythm Lab Data Personally Reviewed: 
 
Recent Labs 11/11/18 
0526 11/10/18 
4545 WBC 9.4 10.5 HGB 11.8* 12.2 HCT 35.5* 37.2  169 LABRCNT(INR:3,PTP:3,APTT:3,) Recent Labs 11/11/18 
0526 11/10/18 
0447 11/09/18 
1249  133* 131*  
K 3.9 3.9 4.1  99 96* CO2 24 25 25 BUN 30* 22* 19  
CREA 1.24 1.18 1.33* * 225* 131* CA 7.4* 7.6* 7.8* LABRCNT(CPK:3,CpKMB:3,ckndx:3,troiq:3)No results found for: CHOL, CHOLX, CHLST, CHOLV, HDL, LDL, LDLC, DLDLP, TGLX, TRIGL, TRIGP, CHHD, CHHDXLABRCNT(sgot:3,gpt:3,ap:3,tbiL:3,TP:3,ALB:3,GLOB:3,ggt:3,aml:3,amyp:3,lpse:3,hlpse:3)No results for input(s): PH, PCO2, PO2 in the last 72 hours. No results found for: CHOL, CHOLX, CHLST, CHOLV, HDL, LDL, LDLC, DLDLP, TGLX, TRIGL, TRIGP, CHHD, CHHDXMEDTABLETimothy Nery Jackson MD 
No results for input(s): PH, PCO2, PO2 in the last 72 hours. Medications Personally Reviewed: 
 
Current Facility-Administered Medications Medication Dose Route Frequency  hydrocortisone Sod Succ (PF) (SOLU-CORTEF) injection 25 mg  25 mg IntraVENous BID  
 amiodarone (CORDARONE) tablet 400 mg  400 mg Oral DAILY  sodium chloride (NS) flush 5-10 mL  5-10 mL IntraVENous PRN  
 levoFLOXacin (LEVAQUIN) 750 mg in D5W IVPB  750 mg IntraVENous Q48H  
 apixaban (ELIQUIS) tablet 2.5 mg  2.5 mg Oral BID  latanoprost (XALATAN) 0.005 % ophthalmic solution 1 Drop  1 Drop Both Eyes QHS  nateglinide (STARLIX) 120 mg tablet 60 mg  60 mg Oral TIDAC  insulin lispro (HUMALOG) injection   SubCUTAneous AC&HS  
 glucose chewable tablet 16 g  4 Tab Oral PRN  
 dextrose (D50W) injection syrg 12.5-25 g  12.5-25 g IntraVENous PRN  
 glucagon (GLUCAGEN) injection 1 mg  1 mg IntraMUSCular PRN  
 traMADol (ULTRAM) tablet 25-50 mg  25-50 mg Oral Q8H PRN  
 sodium chloride (NS) flush 5-10 mL  5-10 mL IntraVENous Q8H  
 sodium chloride (NS) flush 5-10 mL  5-10 mL IntraVENous PRN  
 acetaminophen (TYLENOL) tablet 650 mg  650 mg Oral Q6H PRN  
 ondansetron (ZOFRAN) injection 4 mg  4 mg IntraVENous Q4H PRN  
 furosemide (LASIX) injection 20 mg  20 mg IntraVENous BID Rian Montez MD

## 2018-11-11 NOTE — PROGRESS NOTES
Bedside shift change report given to [address] [place] [address] Street (oncoming nurse) by Efrem Scott RN (offgoing nurse). Report included the following information SBAR, Kardex, MAR and Recent Results.

## 2018-11-11 NOTE — PROGRESS NOTES
[address] [place] [address] 
[phone] Medical Progress Note NAME: Leroy Mckee :  1931 MRM:  [id number] Date/Time: 2018  6:39 AM 
  
Subjective:  
 
Admitted with acute respiratory failure, PNA, CHF, rapid afib, hyponatremia, metastatic melanoma and adrenal insufficiency. On  Oral amiodarone - in and out of afib throughout night. . Has diuresed  and feels much better with improvement of SOB. Afebrile. Past Medical History:  
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 hodges, radiologist  
 Atrial flutter [facility])   
 s/p cardioversion  Cancer [facility])  LEFT FOOT ,MELANOMA  Cancer ([facility] [address]  RIGHT KIDNEY  
 Cancer [facility])  PROSTATE  TREATED WITH RADIATION  Chronic pain BACK AND RIGHT HIP  
 Diabetes mellitus, stable ([address] 2017 Steroid induced  Hyperglycemia 2017 Steroid induced: follow up A1C, BMP, discontinue Starlix in wnl  LBP (low back pain)  Left hip pain 2017 Xray shows no obvious Fx or Metastatic lesions, this pain could be arthritic in nature or radicular, will give a corticosteroid injection, if no benefit may need Ortho eval  
 Malignant neoplasm of prostate ([address] 2017  OA (osteoarthritis)  Prostate cancer ([address]  Pure hyperglyceridemia 2017  Renal cell cancer (right)  Small bowel obstruction ([address] 2017 ROS:  General: negative for fever, chills, sweats, weakness Respiratory:  positive for improvement in SOB Cardiology:  negative for chest pain, palpitations, orthopnea, PND, edema, syncope Gastrointestinal: negative for abdominal pain, N/V, dysphagia, change in bowel habits, bleeding Genitourinary: negative for frequency, urgency, dysuria, hematuria, incontinence Muskuloskeletal : negative for arthralgia, myalgia Dermatological: negative for rash, ulceration, mole change, new lesion Neurological: negative for headache, dizziness, confusion, focal weakness, paresthesia, memory loss, gait disturbance Psychological: negative for anxiety, depression, agitation Review of systems otherwise negative Objective:  
 
 
Vitals:  
 
  
Last 24hrs VS reviewed since prior progress note. Most recent are: 
 
Visit Vitals /78 (BP 1 Location: Left arm, BP Patient Position: At rest) Pulse 86 Temp 98.1 °F (36.7 °C) Resp 16 Ht 5' 8\" (1.727 m) Wt 177 lb 0.5 oz (80.3 kg) SpO2 93% BMI 26.92 kg/m² SpO2 Readings from Last 6 Encounters:  
11/11/18 93% 11/07/18 98% 11/03/18 95% 10/31/18 92% 01/19/18 96% 01/10/18 97% O2 Flow Rate (L/min): 2 l/min Intake/Output Summary (Last 24 hours) at 11/11/2018 1462 Last data filed at 11/11/2018 8978 Gross per 24 hour Intake 510 ml Output 1000 ml Net -490 ml Telemetry reviewed:   AFIB Tubes:   n/a 
X-Ray:  Chest xray - Development of a mild pulmonary edema pattern CT chest -Mild pulmonary edema with an upper lobe distribution. No definite infiltrate. Follow-up to resolution recommended. Procedures:   N/A Exam:  
 
General   Thin 81 y/o male, alert and in NAD Respiratory   Generally clear with eales at right base Cardiology  Slightly irregular without murmur Abdominal  Soft, non-tender, non-distended, positive bowel sounds, no hepatosplenomegaly Extremities  No clubbing, cyanosis, or edema. Pulses intact. Skin  Normal skin turgor. No rashes or skin ulcers noted Neurological  No focal neurological deficits noted Psychological  Oriented x 3. Normal affect. Exam otherwise negative Lab Data Reviewed: (see below) Medications Reviewed: (see below) 
 
______________________________________________________________________ Medications:  
 
Current Facility-Administered Medications Medication Dose Route Frequency  amiodarone (CORDARONE) tablet 400 mg  400 mg Oral DAILY  sodium chloride (NS) flush 5-10 mL  5-10 mL IntraVENous PRN  
 levoFLOXacin (LEVAQUIN) 750 mg in D5W IVPB  750 mg IntraVENous Q48H  
 apixaban (ELIQUIS) tablet 2.5 mg  2.5 mg Oral BID  latanoprost (XALATAN) 0.005 % ophthalmic solution 1 Drop  1 Drop Both Eyes QHS  nateglinide (STARLIX) 120 mg tablet 60 mg  60 mg Oral TIDAC  insulin lispro (HUMALOG) injection   SubCUTAneous AC&HS  
 glucose chewable tablet 16 g  4 Tab Oral PRN  
 dextrose (D50W) injection syrg 12.5-25 g  12.5-25 g IntraVENous PRN  
 glucagon (GLUCAGEN) injection 1 mg  1 mg IntraMUSCular PRN  
 traMADol (ULTRAM) tablet 25-50 mg  25-50 mg Oral Q8H PRN  
 sodium chloride (NS) flush 5-10 mL  5-10 mL IntraVENous Q8H  
 sodium chloride (NS) flush 5-10 mL  5-10 mL IntraVENous PRN  
 acetaminophen (TYLENOL) tablet 650 mg  650 mg Oral Q6H PRN  
 ondansetron (ZOFRAN) injection 4 mg  4 mg IntraVENous Q4H PRN  
 furosemide (LASIX) injection 20 mg  20 mg IntraVENous BID  hydrocortisone Sod Succ (PF) (SOLU-CORTEF) injection 50 mg  50 mg IntraVENous BID Lab Review:  
 
Recent Labs 11/11/18 
0526 11/10/18 
0447 11/09/18 
1249 WBC 9.4 10.5 12.0*  
HGB 11.8* 12.2 13.0 HCT 35.5* 37.2 39.8  169 170 Recent Labs 11/11/18 
0526 11/10/18 
0447 11/09/18 
1249  133* 131*  
K 3.9 3.9 4.1  99 96* CO2 24 25 25 * 225* 131* BUN 30* 22* 19  
CREA 1.24 1.18 1.33* CA 7.4* 7.6* 7.8* ALB  --  2.4* 2.4* TBILI  --  0.9 1.0  
SGOT  --  12* 14* ALT  --  14 17 Lab Results Component Value Date/Time Glucose (POC) 152 (H) 11/10/2018 08:51 PM  
 Glucose (POC) 178 (H) 11/10/2018 04:38 PM  
 Glucose (POC) 278 (H) 11/10/2018 11:13 AM  
 Glucose (POC) 211 (H) 11/10/2018 07:30 AM  
 Glucose (POC) 331 (H) 11/09/2018 09:25 PM  
 
No results for input(s): PH, PCO2, PO2, HCO3, FIO2 in the last 72 hours. No results for input(s): INR in the last 72 hours. No lab exists for component: INREXT, INREXT Assessment:  
 
Principal Problem: 
  Acute respiratory failure with hypoxia ([facility] (11/9/2018) Active Problems: 
  A-fib ([facility] (9/20/2012) Melanoma ([facility] (1/13/2015) CHF (congestive heart failure) ([facility] (8/9/2016) Pneumonia (8/10/2016) Malignant neoplasm of prostate ([facility] (7/13/2017) Adrenal insufficiency (Adams's disease) ([facility] (11/4/2018) Hyponatremia (11/4/2018) Plan:  
 
Risk of deterioration: medium 1. Continue IV diuresis 2. Continue amiodarone 3. On stress doses of steroids - will reduce dose 4. IV levaquin for possible PNA 5. SSI coverage 6. Follow labs 7. Appreciate cardiology help Care Plan discussed with: Patient Discussed:  Care Plan Prophylaxis:  Eliquis Disposition:  Home w/Family 
        
___________________________________________________ Attending Physician: Glen Vergara MD

## 2018-11-12 ENCOUNTER — PATIENT OUTREACH (OUTPATIENT)
Dept: CARDIOLOGY CLINIC | Age: 83
End: 2018-11-12

## 2018-11-12 ENCOUNTER — APPOINTMENT (OUTPATIENT)
Dept: GENERAL RADIOLOGY | Age: 83
DRG: 308 | End: 2018-11-12
Attending: INTERNAL MEDICINE
Payer: MEDICARE

## 2018-11-12 LAB
ANION GAP SERPL CALC-SCNC: 8 MMOL/L (ref 5–15)
BUN SERPL-MCNC: 38 MG/DL (ref 6–20)
BUN/CREAT SERPL: 29 (ref 12–20)
CALCIUM SERPL-MCNC: 7.4 MG/DL (ref 8.5–10.1)
CHLORIDE SERPL-SCNC: 103 MMOL/L (ref 97–108)
CO2 SERPL-SCNC: 24 MMOL/L (ref 21–32)
CREAT SERPL-MCNC: 1.33 MG/DL (ref 0.7–1.3)
ERYTHROCYTE [DISTWIDTH] IN BLOOD BY AUTOMATED COUNT: 14 % (ref 11.5–14.5)
GLUCOSE BLD STRIP.AUTO-MCNC: 115 MG/DL (ref 65–100)
GLUCOSE BLD STRIP.AUTO-MCNC: 129 MG/DL (ref 65–100)
GLUCOSE BLD STRIP.AUTO-MCNC: 154 MG/DL (ref 65–100)
GLUCOSE BLD STRIP.AUTO-MCNC: 257 MG/DL (ref 65–100)
GLUCOSE SERPL-MCNC: 202 MG/DL (ref 65–100)
HCT VFR BLD AUTO: 35.7 % (ref 36.6–50.3)
HGB BLD-MCNC: 11.7 G/DL (ref 12.1–17)
MAGNESIUM SERPL-MCNC: 2.2 MG/DL (ref 1.6–2.4)
MCH RBC QN AUTO: 29.9 PG (ref 26–34)
MCHC RBC AUTO-ENTMCNC: 32.8 G/DL (ref 30–36.5)
MCV RBC AUTO: 91.3 FL (ref 80–99)
NRBC # BLD: 0 K/UL (ref 0–0.01)
NRBC BLD-RTO: 0 PER 100 WBC
PLATELET # BLD AUTO: 221 K/UL (ref 150–400)
PMV BLD AUTO: 10.7 FL (ref 8.9–12.9)
POTASSIUM SERPL-SCNC: 4.3 MMOL/L (ref 3.5–5.1)
RBC # BLD AUTO: 3.91 M/UL (ref 4.1–5.7)
SERVICE CMNT-IMP: ABNORMAL
SODIUM SERPL-SCNC: 135 MMOL/L (ref 136–145)
WBC # BLD AUTO: 9.3 K/UL (ref 4.1–11.1)

## 2018-11-12 PROCEDURE — 74011250637 HC RX REV CODE- 250/637: Performed by: INTERNAL MEDICINE

## 2018-11-12 PROCEDURE — 83735 ASSAY OF MAGNESIUM: CPT

## 2018-11-12 PROCEDURE — 65660000000 HC RM CCU STEPDOWN

## 2018-11-12 PROCEDURE — 82962 GLUCOSE BLOOD TEST: CPT

## 2018-11-12 PROCEDURE — 74011636637 HC RX REV CODE- 636/637: Performed by: INTERNAL MEDICINE

## 2018-11-12 PROCEDURE — 71046 X-RAY EXAM CHEST 2 VIEWS: CPT

## 2018-11-12 PROCEDURE — 80048 BASIC METABOLIC PNL TOTAL CA: CPT

## 2018-11-12 PROCEDURE — 36415 COLL VENOUS BLD VENIPUNCTURE: CPT

## 2018-11-12 PROCEDURE — 85027 COMPLETE CBC AUTOMATED: CPT

## 2018-11-12 PROCEDURE — 74011250636 HC RX REV CODE- 250/636: Performed by: INTERNAL MEDICINE

## 2018-11-12 RX ORDER — HYDROCORTISONE 10 MG/1
10 TABLET ORAL 2 TIMES DAILY WITH MEALS
Status: DISCONTINUED | OUTPATIENT
Start: 2018-11-12 | End: 2018-11-13 | Stop reason: HOSPADM

## 2018-11-12 RX ADMIN — NATEGLINIDE 60 MG: 120 TABLET, FILM COATED ORAL at 17:11

## 2018-11-12 RX ADMIN — NATEGLINIDE 60 MG: 120 TABLET, FILM COATED ORAL at 08:41

## 2018-11-12 RX ADMIN — HYDROCORTISONE 10 MG: 10 TABLET ORAL at 08:41

## 2018-11-12 RX ADMIN — NATEGLINIDE 60 MG: 120 TABLET, FILM COATED ORAL at 11:33

## 2018-11-12 RX ADMIN — INSULIN LISPRO 2 UNITS: 100 INJECTION, SOLUTION INTRAVENOUS; SUBCUTANEOUS at 08:42

## 2018-11-12 RX ADMIN — FUROSEMIDE 20 MG: 10 INJECTION, SOLUTION INTRAMUSCULAR; INTRAVENOUS at 08:42

## 2018-11-12 RX ADMIN — LATANOPROST 1 DROP: 50 SOLUTION OPHTHALMIC at 21:48

## 2018-11-12 RX ADMIN — HYDROCORTISONE 10 MG: 10 TABLET ORAL at 17:11

## 2018-11-12 RX ADMIN — INSULIN LISPRO 3 UNITS: 100 INJECTION, SOLUTION INTRAVENOUS; SUBCUTANEOUS at 21:46

## 2018-11-12 RX ADMIN — APIXABAN 2.5 MG: 2.5 TABLET, FILM COATED ORAL at 08:42

## 2018-11-12 RX ADMIN — AMIODARONE HYDROCHLORIDE 400 MG: 200 TABLET ORAL at 08:41

## 2018-11-12 RX ADMIN — Medication 10 ML: at 13:35

## 2018-11-12 RX ADMIN — Medication 10 ML: at 21:47

## 2018-11-12 RX ADMIN — FUROSEMIDE 20 MG: 10 INJECTION, SOLUTION INTRAMUSCULAR; INTRAVENOUS at 17:13

## 2018-11-12 RX ADMIN — APIXABAN 2.5 MG: 2.5 TABLET, FILM COATED ORAL at 17:11

## 2018-11-12 NOTE — PROGRESS NOTES
[facility] met with patient today to provide an introduction to self, the [address] at [facility]. Bundled Payment Care Program: 
 
Patient was provided a copy of the [facility] letter. Patient states that they have a functioning scale at home. Patient was not provided a scale during this visit. Reinforced the importance of checking their weight at the same time daily and calling the cardiologist if their weight is up 3 lbs. in a day or 5 lbs. in a week (or per MD guidelines). Patient  has received a \"Living with Heart Failure\" patient education book. Hug At Home Program: 
 
Provided patient demonstration of [address] program on training iPad. Patient was not interested in the [address] program. Patient said they live in an area that they do not have good connectivity and would rather not try the [address] program. 
  
Patient verbalized understanding of all information discussed.

## 2018-11-12 NOTE — CDMP QUERY
Please clarify if this patient is (was) being treated/managed for:  
 
=> Acute respiratory failure with hypoxia in the setting of pulmonary edema due to Rapid Atrial Fibrillation with RVR requiring Cardiology consult and IV Amiodarone  
=> Acute respiratory failure with hypoxia in the setting of pulmonary edema due to Acute on Chronic diastolic congestive heart failure requiring Cardiology consult and IV diuretics 
=> Acute respiratory failure with hypoxia in the setting of pulmonary edema due to Chronic diastolic congestive heart failure requiring Cardiology consult and IV diuretics =>Sepsis in the setting of tachycardia, tachypnea, leukocytosis and elevated lactic acid due due to PNA  POA, requiring IV antibiotics and IV Fluid bolus 
=> Other explanation of clinical findings 
=> Clinically Undetermined (no explanation for clinical findings) The medical record reflects the following clinical findings, treatment, and risk factors. Risk Factors:  sob; weakness; hx of metastatic melanoma; recent d/c for PNA Clinical Indicators:  HR: 101-121 (hx of a-fib); RR: 22-32; wbc: 12.0; bands: 10.5; lac acid: 2.1; O2 sats: 91% on RA. Shawn Pottier 93-97% on 2L NC; pro-BNP: 2,796; cxr: Development of a mild pulmonary edema pattern Treatment: IVF NS Bolus 500ml; Solu-cortef 100mg IV; Amiodarone 150mg IV; Levaquin 750 mg IV; Cardiology Consult Please clarify and document your clinical opinion in the progress notes and discharge summary including the definitive and/or presumptive diagnosis, (suspected or probable), related to the above clinical findings. Please include clinical findings supporting your diagnosis. Thank Dagoberto Shanks [facility] 
[phone]

## 2018-11-12 NOTE — PROGRESS NOTES
[address] [place] [address] 
[phone] Medical Progress Note NAME: Pablo Anglin :  1931 MRM:  [id number] Date/Time: 2018  7:50  AM 
  
Subjective:  
 
Admitted with acute respiratory failure, PNA, CHF, rapid afib, hyponatremia, metastatic melanoma and adrenal insufficiency. On  Oral amiodarone -  PAF converted to NSR. Has diuresed  and feels much better with improvement of SOB. Afebrile. Past Medical History:  
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 hodges, radiologist  
 Atrial flutter [facility])   
 s/p cardioversion  Cancer [facility])  LEFT FOOT ,MELANOMA  Cancer ([facility]  RIGHT KIDNEY  
 Cancer [facility])  PROSTATE  TREATED WITH RADIATION  Chronic pain BACK AND RIGHT HIP  
 Diabetes mellitus, stable ([address] 2017 Steroid induced  Hyperglycemia 2017 Steroid induced: follow up A1C, BMP, discontinue Starlix in wnl  LBP (low back pain)  Left hip pain 2017 Xray shows no obvious Fx or Metastatic lesions, this pain could be arthritic in nature or radicular, will give a corticosteroid injection, if no benefit may need Ortho eval  
 Malignant neoplasm of prostate ([address] 2017  OA (osteoarthritis)  Prostate cancer ([address]  Pure hyperglyceridemia 2017  Renal cell cancer (right)  Small bowel obstruction ([address] 2017 ROS:  General: negative for fever, chills, sweats, weakness Respiratory:  positive for improvement in SOB Cardiology:  negative for chest pain, palpitations, orthopnea, PND, edema, syncope Gastrointestinal: negative for abdominal pain, N/V, dysphagia, change in bowel habits, bleeding Genitourinary: negative for frequency, urgency, dysuria, hematuria, incontinence Muskuloskeletal : negative for arthralgia, myalgia Dermatological: negative for rash, ulceration, mole change, new lesion Neurological: negative for headache, dizziness, confusion, focal weakness, paresthesia, memory loss, gait disturbance Psychological: negative for anxiety, depression, agitation Review of systems otherwise negative Objective:  
 
 
Vitals:  
 
  
Last 24hrs VS reviewed since prior progress note. Most recent are: 
 
Visit Vitals /78 (BP 1 Location: Left arm, BP Patient Position: At rest;Sitting) Pulse 65 Temp 97.8 °F (36.6 °C) Resp 18 Ht 5' 8\" (1.727 m) Wt 173 lb 15.1 oz (78.9 kg) SpO2 99% BMI 26.45 kg/m² SpO2 Readings from Last 6 Encounters:  
11/12/18 99% 11/07/18 98% 11/03/18 95% 10/31/18 92% 01/19/18 96% 01/10/18 97% O2 Flow Rate (L/min): 2 l/min Intake/Output Summary (Last 24 hours) at 11/12/2018 0750 Last data filed at 11/12/2018 0300 Gross per 24 hour Intake 720 ml Output 1800 ml Net -1080 ml Telemetry reviewed:   AFIB Tubes:   n/a 
X-Ray:  Chest xray - Development of a mild pulmonary edema pattern CT chest -Mild pulmonary edema with an upper lobe distribution. No definite infiltrate. Follow-up to resolution recommended. Procedures:   N/A Exam:  
 
General   Thin 81 y/o male, alert and in NAD Respiratory   CTA bilaterally, no rales or ronchi 
Cardiology  Slightly irregular without murmur Abdominal  Soft, non-tender, non-distended, positive bowel sounds, no hepatosplenomegaly Extremities  No clubbing, cyanosis, or edema. Pulses intact. Skin  Normal skin turgor. No rashes or skin ulcers noted Neurological  No focal neurological deficits noted Psychological  Oriented x 3. Normal affect. Exam otherwise negative Lab Data Reviewed: (see below) Medications Reviewed: (see below) 
 
______________________________________________________________________ Medications:  
 
Current Facility-Administered Medications Medication Dose Route Frequency  hydrocortisone Sod Succ (PF) (SOLU-CORTEF) injection 25 mg  25 mg IntraVENous BID  
 amiodarone (CORDARONE) tablet 400 mg  400 mg Oral DAILY  sodium chloride (NS) flush 5-10 mL  5-10 mL IntraVENous PRN  
 levoFLOXacin (LEVAQUIN) 750 mg in D5W IVPB  750 mg IntraVENous Q48H  
 apixaban (ELIQUIS) tablet 2.5 mg  2.5 mg Oral BID  latanoprost (XALATAN) 0.005 % ophthalmic solution 1 Drop  1 Drop Both Eyes QHS  nateglinide (STARLIX) 120 mg tablet 60 mg  60 mg Oral TIDAC  insulin lispro (HUMALOG) injection   SubCUTAneous AC&HS  
 glucose chewable tablet 16 g  4 Tab Oral PRN  
 dextrose (D50W) injection syrg 12.5-25 g  12.5-25 g IntraVENous PRN  
 glucagon (GLUCAGEN) injection 1 mg  1 mg IntraMUSCular PRN  
 traMADol (ULTRAM) tablet 25-50 mg  25-50 mg Oral Q8H PRN  
 sodium chloride (NS) flush 5-10 mL  5-10 mL IntraVENous Q8H  
 sodium chloride (NS) flush 5-10 mL  5-10 mL IntraVENous PRN  
 acetaminophen (TYLENOL) tablet 650 mg  650 mg Oral Q6H PRN  
 ondansetron (ZOFRAN) injection 4 mg  4 mg IntraVENous Q4H PRN  
 furosemide (LASIX) injection 20 mg  20 mg IntraVENous BID Lab Review:  
 
Recent Labs 11/12/18 
8772 11/11/18 
0526 11/10/18 
3984 WBC 9.3 9.4 10.5 HGB 11.7* 11.8* 12.2 HCT 35.7* 35.5* 37.2  198 169 Recent Labs 11/12/18 
0359 11/11/18 
0526 11/10/18 
0447 11/09/18 
1249 * 136 133* 131* K 4.3 3.9 3.9 4.1  103 99 96* CO2 24 24 25 25 * 227* 225* 131* BUN 38* 30* 22* 19  
CREA 1.33* 1.24 1.18 1.33* CA 7.4* 7.4* 7.6* 7.8*  
MG 2.2  --   --   --   
ALB  --   --  2.4* 2.4* TBILI  --   --  0.9 1.0  
SGOT  --   --  12* 14* ALT  --   --  14 17 Lab Results Component Value Date/Time Glucose (POC) 154 (H) 11/12/2018 07:29 AM  
 Glucose (POC) 180 (H) 11/11/2018 08:56 PM  
 Glucose (POC) 232 (H) 11/11/2018 04:52 PM  
 Glucose (POC) 301 (H) 11/11/2018 11:46 AM  
 Glucose (POC) 175 (H) 11/11/2018 07:47 AM  
 
No results for input(s): PH, PCO2, PO2, HCO3, FIO2 in the last 72 hours. No results for input(s): INR in the last 72 hours. No lab exists for component: INREXT, INREXT Assessment:  
 
Principal Problem: 
  Acute respiratory failure with hypoxia ([facility] (11/9/2018) Active Problems: 
  A-fib ([facility] (9/20/2012) Melanoma ([facility] (1/13/2015) CHF (congestive heart failure) ([facility] (8/9/2016) Pneumonia (8/10/2016) Malignant neoplasm of prostate ([facility] (7/13/2017) Adrenal insufficiency (Jose's disease) ([facility] (11/4/2018) Hyponatremia (11/4/2018) Plan:  
 
Risk of deterioration: medium 1. Continue IV diuresis 2. Continue amiodarone 3. On stress doses of steroids - will reduce dose 4. IV levaquin for possible PNA 5. SSI coverage 6. Follow labs 7. Appreciate cardiology help Care Plan discussed with: Patient Discussed:  Care Plan Prophylaxis:  Eliquis Disposition:  Home w/Family 
        
___________________________________________________ Attending Physician: Gaston Riedel, MD

## 2018-11-12 NOTE — PROGRESS NOTES
Progress Note 11/12/2018 12:38 PM 
NAME: Sandra Glynn MRN:  [id number] Admit Diagnosis: Acute respiratory failure with hypoxia ([address] Acute respiratory failure with hypoxia ([address] Assessment:  
 
 
Acute hypoxic respiratory failure. Pulmonary edema. Recurrent A Fib eith RVR  
RBBB / [facility] Recent pneumonia. Metastatic melanoma Diabetes Prostate CA Hx Kidney CA Hx of A Flutter with CV  
 
11/10 he has converted back to NSR>    Diuresed well. O2 better feeling better. 11/11 Parox A fib noted. Continue with Amiodarone. 11/12 cardiac stable. NSR . Discussed with Dr Karthik Gaviria . Plan:  
 
Resume PO Amiodarone. Maybe home tomorrow. [x]        High complexity decision making was performed Subjective:  
 
Sandra Son denies chest pain, dyspnea. Discussed with RN events overnight. Patient Active Problem List  
Diagnosis Code  A-fib ([facility]) I48.91  
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 
 
Symptom Y/N Comments  Symptom Y/N Comments Fever/Chills N   Chest Pain N Poor Appetite N   Edema N   
Cough N   Abdominal Pain N Sputum N   Joint Pain N   
SOB/JARQUIN N   Pruritis/Rash N   
Nausea/vomit N   Tolerating PT/OT Y Diarrhea N   Tolerating Diet Y Constipation N   Other Could NOT obtain due to: Objective:  
  
Physical Exam: 
 
Last 24hrs VS reviewed since prior progress note. Most recent are: 
 
Visit Vitals /72 (BP 1 Location: Right arm, BP Patient Position: At rest) Pulse 72 Temp 96 °F (35.6 °C) Resp 18 Ht 5' 8\" (1.727 m) Wt 78.9 kg (173 lb 15.1 oz) SpO2 95% BMI 26.45 kg/m² Intake/Output Summary (Last 24 hours) at 11/12/2018 1514 Last data filed at 11/12/2018 5871 Gross per 24 hour Intake 480 ml Output 1300 ml Net -820 ml General Appearance: Well developed, well nourished, alert & oriented x 3,  
 no acute distress. Ears/Nose/Mouth/Throat: Hearing grossly normal. 
Neck: Supple. Chest: Lungs clear to auscultation bilaterally. Cardiovascular: Regular rate and rhythm, S1S2 normal, no murmur. Abdomen: Soft, non-tender, bowel sounds are active. Extremities: No edema bilaterally. Skin: Warm and dry. PMH/SH reviewed - no change compared to H&P Data Review Telemetry: normal sinus rhythm Lab Data Personally Reviewed: 
 
Recent Labs 11/12/18 
0359 11/11/18 
6394 WBC 9.3 9.4 HGB 11.7* 11.8* HCT 35.7* 35.5*  198 LABRCNT(INR:3,PTP:3,APTT:3,) Recent Labs 11/12/18 
0711 11/11/18 
0526 11/10/18 
0459 * 136 133* K 4.3 3.9 3.9  103 99 CO2 24 24 25 BUN 38* 30* 22* CREA 1.33* 1.24 1.18  
* 227* 225* CA 7.4* 7.4* 7.6*  
MG 2.2  --   --   
LABRCNT(CPK:3,CpKMB:3,ckndx:3,troiq:3)No results found for: CHOL, CHOLX, CHLST, CHOLV, HDL, LDL, LDLC, DLDLP, TGLX, TRIGL, TRIGP, CHHD, CHHDXLABRCNT(sgot:3,gpt:3,ap:3,tbiL:3,TP:3,ALB:3,GLOB:3,ggt:3,aml:3,amyp:3,lpse:3,hlpse:3)No results for input(s): PH, PCO2, PO2 in the last 72 hours. No results found for: CHOL, CHOLX, CHLST, CHOLV, HDL, LDL, LDLC, DLDLP, TGLX, TRIGL, TRIGP, CHHD, CHHDXMEDTABLETimothy Nelly Espinosa MD 
No results for input(s): PH, PCO2, PO2 in the last 72 hours. Medications Personally Reviewed: 
 
Current Facility-Administered Medications Medication Dose Route Frequency  hydrocortisone (CORTEF) tablet 10 mg  10 mg Oral BID WITH MEALS  
 amiodarone (CORDARONE) tablet 400 mg  400 mg Oral DAILY  sodium chloride (NS) flush 5-10 mL  5-10 mL IntraVENous PRN  
 levoFLOXacin (LEVAQUIN) 750 mg in D5W IVPB  750 mg IntraVENous Q48H  
 apixaban (ELIQUIS) tablet 2.5 mg  2.5 mg Oral BID  latanoprost (XALATAN) 0.005 % ophthalmic solution 1 Drop  1 Drop Both Eyes QHS  nateglinide (STARLIX) 120 mg tablet 60 mg  60 mg Oral TIDAC  insulin lispro (HUMALOG) injection   SubCUTAneous AC&HS  
 glucose chewable tablet 16 g  4 Tab Oral PRN  
 dextrose (D50W) injection syrg 12.5-25 g  12.5-25 g IntraVENous PRN  
 glucagon (GLUCAGEN) injection 1 mg  1 mg IntraMUSCular PRN  
 traMADol (ULTRAM) tablet 25-50 mg  25-50 mg Oral Q8H PRN  
 sodium chloride (NS) flush 5-10 mL  5-10 mL IntraVENous Q8H  
 sodium chloride (NS) flush 5-10 mL  5-10 mL IntraVENous PRN  
 acetaminophen (TYLENOL) tablet 650 mg  650 mg Oral Q6H PRN  
 ondansetron (ZOFRAN) injection 4 mg  4 mg IntraVENous Q4H PRN  
 furosemide (LASIX) injection 20 mg  20 mg IntraVENous BID Sun Luna MD

## 2018-11-13 VITALS
DIASTOLIC BLOOD PRESSURE: 63 MMHG | BODY MASS INDEX: 26.16 KG/M2 | HEART RATE: 73 BPM | RESPIRATION RATE: 18 BRPM | HEIGHT: 68 IN | SYSTOLIC BLOOD PRESSURE: 124 MMHG | TEMPERATURE: 98.1 F | OXYGEN SATURATION: 93 % | WEIGHT: 172.62 LBS

## 2018-11-13 LAB
ANION GAP SERPL CALC-SCNC: 8 MMOL/L (ref 5–15)
BASOPHILS # BLD: 0 K/UL (ref 0–0.1)
BASOPHILS NFR BLD: 0 % (ref 0–1)
BUN SERPL-MCNC: 35 MG/DL (ref 6–20)
BUN/CREAT SERPL: 28 (ref 12–20)
CALCIUM SERPL-MCNC: 7.8 MG/DL (ref 8.5–10.1)
CHLORIDE SERPL-SCNC: 104 MMOL/L (ref 97–108)
CO2 SERPL-SCNC: 26 MMOL/L (ref 21–32)
CREAT SERPL-MCNC: 1.23 MG/DL (ref 0.7–1.3)
DIFFERENTIAL METHOD BLD: ABNORMAL
EOSINOPHIL # BLD: 0.1 K/UL (ref 0–0.4)
EOSINOPHIL NFR BLD: 1 % (ref 0–7)
ERYTHROCYTE [DISTWIDTH] IN BLOOD BY AUTOMATED COUNT: 14.1 % (ref 11.5–14.5)
GLUCOSE BLD STRIP.AUTO-MCNC: 96 MG/DL (ref 65–100)
GLUCOSE SERPL-MCNC: 125 MG/DL (ref 65–100)
HCT VFR BLD AUTO: 37.2 % (ref 36.6–50.3)
HGB BLD-MCNC: 12.1 G/DL (ref 12.1–17)
IMM GRANULOCYTES # BLD: 0.2 K/UL (ref 0–0.04)
IMM GRANULOCYTES NFR BLD AUTO: 2 % (ref 0–0.5)
LYMPHOCYTES # BLD: 0.5 K/UL (ref 0.8–3.5)
LYMPHOCYTES NFR BLD: 7 % (ref 12–49)
MCH RBC QN AUTO: 29.8 PG (ref 26–34)
MCHC RBC AUTO-ENTMCNC: 32.5 G/DL (ref 30–36.5)
MCV RBC AUTO: 91.6 FL (ref 80–99)
MONOCYTES # BLD: 0.6 K/UL (ref 0–1)
MONOCYTES NFR BLD: 7 % (ref 5–13)
NEUTS SEG # BLD: 6.6 K/UL (ref 1.8–8)
NEUTS SEG NFR BLD: 83 % (ref 32–75)
NRBC # BLD: 0 K/UL (ref 0–0.01)
NRBC BLD-RTO: 0 PER 100 WBC
PLATELET # BLD AUTO: 214 K/UL (ref 150–400)
PMV BLD AUTO: 10.8 FL (ref 8.9–12.9)
POTASSIUM SERPL-SCNC: 4.2 MMOL/L (ref 3.5–5.1)
RBC # BLD AUTO: 4.06 M/UL (ref 4.1–5.7)
SERVICE CMNT-IMP: NORMAL
SODIUM SERPL-SCNC: 138 MMOL/L (ref 136–145)
WBC # BLD AUTO: 7.9 K/UL (ref 4.1–11.1)

## 2018-11-13 PROCEDURE — 74011250637 HC RX REV CODE- 250/637: Performed by: INTERNAL MEDICINE

## 2018-11-13 PROCEDURE — 36415 COLL VENOUS BLD VENIPUNCTURE: CPT

## 2018-11-13 PROCEDURE — 80048 BASIC METABOLIC PNL TOTAL CA: CPT

## 2018-11-13 PROCEDURE — 97535 SELF CARE MNGMENT TRAINING: CPT

## 2018-11-13 PROCEDURE — 82962 GLUCOSE BLOOD TEST: CPT

## 2018-11-13 PROCEDURE — 85025 COMPLETE CBC W/AUTO DIFF WBC: CPT

## 2018-11-13 PROCEDURE — 97116 GAIT TRAINING THERAPY: CPT

## 2018-11-13 PROCEDURE — 74011250636 HC RX REV CODE- 250/636: Performed by: INTERNAL MEDICINE

## 2018-11-13 RX ORDER — HEPARIN 100 UNIT/ML
300 SYRINGE INTRAVENOUS AS NEEDED
Status: DISCONTINUED | OUTPATIENT
Start: 2018-11-13 | End: 2018-11-13 | Stop reason: HOSPADM

## 2018-11-13 RX ORDER — FUROSEMIDE 20 MG/1
20 TABLET ORAL DAILY
Qty: 30 TAB | Refills: 3 | Status: SHIPPED | OUTPATIENT
Start: 2018-11-13 | End: 2019-01-01 | Stop reason: SDUPTHER

## 2018-11-13 RX ORDER — AMIODARONE HYDROCHLORIDE 400 MG/1
400 TABLET ORAL DAILY
Qty: 30 TAB | Refills: 1 | Status: SHIPPED | OUTPATIENT
Start: 2018-11-13 | End: 2018-01-01 | Stop reason: SDUPTHER

## 2018-11-13 RX ADMIN — APIXABAN 2.5 MG: 2.5 TABLET, FILM COATED ORAL at 09:00

## 2018-11-13 RX ADMIN — Medication 10 ML: at 05:23

## 2018-11-13 RX ADMIN — AMIODARONE HYDROCHLORIDE 400 MG: 200 TABLET ORAL at 09:00

## 2018-11-13 RX ADMIN — NATEGLINIDE 60 MG: 120 TABLET, FILM COATED ORAL at 07:30

## 2018-11-13 RX ADMIN — FUROSEMIDE 20 MG: 10 INJECTION, SOLUTION INTRAMUSCULAR; INTRAVENOUS at 09:00

## 2018-11-13 RX ADMIN — HYDROCORTISONE 10 MG: 10 TABLET ORAL at 08:00

## 2018-11-13 NOTE — DISCHARGE SUMMARY
Physician Discharge Summary Patient ID: Radha Greene [id number] 
21 y.o. 
12/13/1931 Admit date: 11/9/2018 Discharge date and time:  11/13/18  7:55 AM 
 
Discharge Diagnoses: Atrial fibrillation with RVR, hypoxic respiratory failure, pulmonary edema, pneumonia, adrenal insufficiency, diabetes mellitus Hospital Course: Mr. Soraya Treviño was admitted with acute hypoxic respiratory failure secondary to pulmonary edema from atrial fibrillation with rapid ventricular response. The patient responded nicely to IV diuresis and IV amiodarone. He reverted to normal sinus rhythm. He will be discharged on amiodarone 400 mg 1 tablet daily. He remains anticoagulated on Eliquis. He had been treated for pneumonia and completed his course of antibiotics while hospitalized. His follow-up chest x-ray did not demonstrate any acute infiltrate in the pulmonary edema that was present on admission had resolved completely. Patient's blood glucose was elevated secondary to stress dose steroids and he was covered with sliding scale insulin. He has resumed his usual dose of cortisone for adrenal insufficiency. His renal function and electrolytes remained stable during this hospitalization. He will be discharged on 20 mg of Lasix daily we will monitor his response to therapy. He will have a follow-up office visit in 2 days for reevaluation. PCP: Vivian Macedo MD 
Consults: Cardiology Significant Diagnostic Studies: [facility] Exam: 
Visit Vitals BP 96/67 Pulse (!) 112 Temp 98.2 °F (36.8 °C) Resp 18 Ht 5' 8\" (1.727 m) Wt 172 lb 9.9 oz (78.3 kg) SpO2 96% BMI 26.25 kg/m² General:  Alert, cooperative, no distress, appears stated age. Head:  Normocephalic, without obvious abnormality, atraumatic. Eyes:  Conjunctivae/corneas clear. PERRL, EOMs intact. Fundi benign Ears:  Normal TMs and external ear canals both ears. Nose: Nares normal. Septum midline. Mucosa normal. No drainage or sinus tenderness. Throat: Lips, mucosa, and tongue normal. Teeth and gums normal.  
Neck: Supple, symmetrical, trachea midline, no adenopathy, thyroid: no enlargement/tenderness/nodules, no carotid bruit and no JVD. Back:   Symmetric, no curvature. ROM normal. No CVA tenderness. Lungs:   Clear to auscultation bilaterally. Chest wall:  No tenderness or deformity. Heart:  Regular rate and rhythm, S1, S2 normal, no murmur, click, rub or gallop. Abdomen:   Soft, non-tender. Bowel sounds normal. No masses,  No organomegaly. Genitalia:  Normal male without lesion, discharge or tenderness. Rectal:  Normal tone, normal prostate, no masses or tenderness Guaiac negative stool. Extremities: Extremities normal, atraumatic, no cyanosis or edema. Pulses: 2+ and symmetric all extremities. Skin: Skin color, texture, turgor normal. No rashes or lesions Lymph nodes: Cervical, supraclavicular, and axillary nodes normal.  
Neurologic: CNII-XII intact. Normal strength, sensation and reflexes throughout. Disposition: Home health care nursing and PT Patient Instructions:  
Current Discharge Medication List  
  
CONTINUE these medications which have CHANGED Details  
amiodarone (PACERONE) 400 mg tablet Take 1 Tab by mouth daily. Qty: 30 Tab, Refills: 1  
  
furosemide (LASIX) 20 mg tablet Take 1 Tab by mouth daily. Qty: 30 Tab, Refills: 3 CONTINUE these medications which have NOT CHANGED Details  
traMADol (ULTRAM) 50 mg tablet Take 25-50 mg by mouth every eight (8) hours as needed for Pain. calcium carbonate (CALCIUM 600) 600 mg calcium (1,500 mg) tablet Take 600 mg by mouth daily. nateglinide (STARLIX) 60 mg tablet TAKE 1 TABLET BY MOUTH 3 TIMES A DAY WITH MEALS Qty: 90 Tab, Refills: 3  
  
hydrocortisone (CORTEF) 10 mg tablet TAKE 1 TABLET BY MOUTH TWICE A DAY. now daily Refills: 10  
  
 PREVIDENT 5000 BOOSTER PLUS 1.1 % pste BRUSH AFTER BREAKFAST AND SUPPER CAN ALSO USE IN FLUORIDE TRAY Refills: 12  
  
diclofenac (VOLTAREN) 1 % gel Apply 2 g to affected area four (4) times daily as needed. Qty: 100 g, Refills: 0  
  
bimatoprost (LUMIGAN) 0.01 % ophthalmic drops Administer 1 Drop to both eyes nightly. cholecalciferol (VITAMIN D3) 1,000 unit tablet Take 1,000 Units by mouth daily. apixaban (ELIQUIS) 5 mg tablet Take 2.5 mg by mouth two (2) times a day. acetaminophen (TYLENOL EXTRA STRENGTH) 500 mg tablet Take 500 mg by mouth every six (6) hours as needed for Pain.   
  
  
STOP taking these medications  
  
 levoFLOXacin (LEVAQUIN) 750 mg tablet Comments:  
Reason for Stopping:   
   
  
 
 
 
Signed: 
Ange Clay MD 
11/13/2018 
8:04 AM

## 2018-11-13 NOTE — PROGRESS NOTES
Progress Note 11/13/2018 12:38 PM 
NAME: Gideon Riedel MRN:  [id number] Admit Diagnosis: Acute respiratory failure with hypoxia ([facility] Acute respiratory failure with hypoxia ([facility] Assessment:  
 
 
Acute hypoxic respiratory failure. Pulmonary edema. Recurrent A Fib eith RVR  
RBBB / [facility] Recent pneumonia. Metastatic melanoma Diabetes Prostate CA Hx Kidney CA Hx of A Flutter with CV  
 
11/10 he has converted back to NSR>    Diuresed well. O2 better feeling better. 11/11 Parox A fib noted. Continue with Amiodarone. 11/12 cardiac stable. NSR . Discussed with Dr Carlos Bui . 11/13  Maintaining NSR. Cardiac stable. Plan:  
 
Resume PO Amiodarone. OK for discharge. [x]        High complexity decision making was performed Subjective:  
 
Gideon Riedel denies chest pain, dyspnea. Discussed with RN events overnight. Patient Active Problem List  
Diagnosis Code  A-fib ([facility]) I48.91  
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 
 
Symptom Y/N Comments  Symptom Y/N Comments Fever/Chills N   Chest Pain N Poor Appetite N   Edema N   
Cough N   Abdominal Pain N Sputum N   Joint Pain N   
SOB/JARQUIN N   Pruritis/Rash N   
Nausea/vomit N   Tolerating PT/OT Y Diarrhea N   Tolerating Diet Y   
 Constipation N   Other Could NOT obtain due to:   
 
Objective:  
  
Physical Exam: 
 
Last 24hrs VS reviewed since prior progress note. Most recent are: 
 
Visit Vitals /63 Pulse 73 Temp 98.1 °F (36.7 °C) Resp 18 Ht 5' 8\" (1.727 m) Wt 78.3 kg (172 lb 9.9 oz) SpO2 93% BMI 26.25 kg/m² Intake/Output Summary (Last 24 hours) at 11/13/2018 0935 Last data filed at 11/13/2018 7101 Gross per 24 hour Intake 480 ml Output 900 ml Net -420 ml General Appearance: Well developed, well nourished, alert & oriented x 3,  
 no acute distress. Ears/Nose/Mouth/Throat: Hearing grossly normal. 
Neck: Supple. Chest: Lungs clear to auscultation bilaterally. Cardiovascular: Regular rate and rhythm, S1S2 normal, no murmur. Abdomen: Soft, non-tender, bowel sounds are active. Extremities: No edema bilaterally. Skin: Warm and dry. PMH/SH reviewed - no change compared to H&P Data Review Telemetry: normal sinus rhythm Lab Data Personally Reviewed: 
 
Recent Labs 11/13/18 
0127 11/12/18 
0359 WBC 7.9 9.3 HGB 12.1 11.7* HCT 37.2 35.7*  221 LABRCNT(INR:3,PTP:3,APTT:3,) Recent Labs 11/13/18 
0127 11/12/18 
0359 11/11/18 
4788  135* 136  
K 4.2 4.3 3.9  103 103 CO2 26 24 24 BUN 35* 38* 30* CREA 1.23 1.33* 1.24 * 202* 227* CA 7.8* 7.4* 7.4* MG  --  2.2  --   
LABRCNT(CPK:3,CpKMB:3,ckndx:3,troiq:3)No results found for: CHOL, CHOLX, CHLST, CHOLV, HDL, LDL, LDLC, DLDLP, TGLX, TRIGL, TRIGP, CHHD, CHHDXLABRCNT(sgot:3,gpt:3,ap:3,tbiL:3,TP:3,ALB:3,GLOB:3,ggt:3,aml:3,amyp:3,lpse:3,hlpse:3)No results for input(s): PH, PCO2, PO2 in the last 72 hours. No results found for: CHOL, CHOLX, CHLST, CHOLV, HDL, LDL, LDLC, DLDLP, TGLX, TRIGL, TRIGP, CHHD, CHHDXMEDTABLETimothy Nancy Mckay MD 
No results for input(s): PH, PCO2, PO2 in the last 72 hours. Medications Personally Reviewed: 
 
Current Facility-Administered Medications Medication Dose Route Frequency  hydrocortisone (CORTEF) tablet 10 mg  10 mg Oral BID WITH MEALS  
 amiodarone (CORDARONE) tablet 400 mg  400 mg Oral DAILY  sodium chloride (NS) flush 5-10 mL  5-10 mL IntraVENous PRN  
 levoFLOXacin (LEVAQUIN) 750 mg in D5W IVPB  750 mg IntraVENous Q48H  
 apixaban (ELIQUIS) tablet 2.5 mg  2.5 mg Oral BID  latanoprost (XALATAN) 0.005 % ophthalmic solution 1 Drop  1 Drop Both Eyes QHS  nateglinide (STARLIX) 120 mg tablet 60 mg  60 mg Oral TIDAC  insulin lispro (HUMALOG) injection   SubCUTAneous AC&HS  
 glucose chewable tablet 16 g  4 Tab Oral PRN  
 dextrose (D50W) injection syrg 12.5-25 g  12.5-25 g IntraVENous PRN  
 glucagon (GLUCAGEN) injection 1 mg  1 mg IntraMUSCular PRN  
 traMADol (ULTRAM) tablet 25-50 mg  25-50 mg Oral Q8H PRN  
 sodium chloride (NS) flush 5-10 mL  5-10 mL IntraVENous Q8H  
 sodium chloride (NS) flush 5-10 mL  5-10 mL IntraVENous PRN  
 acetaminophen (TYLENOL) tablet 650 mg  650 mg Oral Q6H PRN  
 ondansetron (ZOFRAN) injection 4 mg  4 mg IntraVENous Q4H PRN  
 furosemide (LASIX) injection 20 mg  20 mg IntraVENous BID Willie Olivares MD

## 2018-11-13 NOTE — PROGRESS NOTES
Problem: Self Care Deficits Care Plan (Adult) Goal: *Acute Goals and Plan of Care (Insert Text) Occupational Therapy Goals: 
Initiated 11/10/2018 1. Patient will perform grooming standing with modified independence within 7 days. 2. Patient will perform toileting with modified independence within 7 days. 3. Patient will perform upper body dressing and lower body dressing with modified independence within 7 days. 4. Patient will perform one light IADL task in standing with modified independence within 7 days. 5. Patient will transfer from toilet with modified independence using the least restrictive device and appropriate durable medical equipment within 7 days. Occupational Therapy TREATMENT Patient: Warren White (49 y.o. male) Date: 11/13/2018 Diagnosis: Acute respiratory failure with hypoxia (HCC) Acute respiratory failure with hypoxia (HCC) Acute respiratory failure with hypoxia ([address] Precautions: Bed Alarm, Fall Chart, occupational therapy assessment, plan of care, and goals were reviewed. ASSESSMENT: 
Nursing cleared for therapy. Received patient up in chair, pleasant and motivated for therapy. Hopeful for dc home with wife today. Noted L forearm skin tear with blood on arm rest.  Discussed with nursing who provided dressing. Provided ed on LB dressing compensatory techniques with sock aide with supervision and verbal ed on elastic shoe laces and reacher use. Toileting training at RW level with SBA. Left up in chair. Recommend dc home with wife support and [facility] therapy for home safety assessment. Progression toward goals: 
[]       Improving appropriately and progressing toward goals [x]       Improving slowly and progressing toward goals 
[]       Not making progress toward goals and plan of care will be adjusted PLAN: 
Patient continues to benefit from skilled intervention to address the above impairments. Continue treatment per established plan of care. Discharge Recommendations:  Home health therapy for home safety assessment Further Equipment Recommendations for Discharge:  Sock aide- issued from therapy. Rec reacher SUBJECTIVE:  
Patient stated I asked the nurse to walk with me yesterday.  OBJECTIVE DATA SUMMARY:  
Cognitive/Behavioral Status: 
  
  
Functional Mobility and Transfers for ADLs:Bed Mobility: 
  
 
Transfers: 
Sit to Stand: Stand-by assistance Functional Transfers Bathroom Mobility: Stand-by assistance Toilet Transfer : Stand-by assistance Bed to Chair: Stand-by assistance Balance: 
Sitting: Intact Standing: Impaired Standing - Static: Constant support;Good Standing - Dynamic : Fair ADL Intervention: 
  
 
Grooming Washing Hands: Supervision/set-up Lower Body Dressing Assistance Socks: Compensatory technique training;Supervision/set-up(sock aide) Shoes with Cloth Laces: Supervision/set-up; Compensatory technique training(additioanl time) Ed on LB dressing compensatory techniques with ed on sock aide. Completed with supervision for one sock. Issued sock aide. Verbal ed on reacher for doffing socks, however was able to doff with crossed leg method. Completed toileting at SBA-supervision level with RW- standing to urinate. Ed on RW placement over toilet and in front of sink with good understanding. Brief ed on ECT. Provided education on energy conservation techniques in regards to ADL/IADL tasks. Discussion with examples of pacing, planning, completion of heavy activity during strongest part of day, seated vs standing, and asking for assistance as needed. Patient with good participation in discussion and fair understanding. Pain: 
Pain Scale 1: Numeric (0 - 10) Pain Intensity 1: 0 Activity Tolerance:  
Slow pace with mobility. HR in 80s. Denies dizziness. After treatment:  
[x] Patient left in no apparent distress sitting up in chair 
[] Patient left in no apparent distress in bed [x] Call bell left within reach [x] Nursing notified 
[] Caregiver present 
[] Bed alarm activated COMMUNICATION/COLLABORATION:  
The patients plan of care was discussed with: Physical Therapist and Registered Nurse Nedra Fuel, OT Time Calculation: 24 mins

## 2018-11-13 NOTE — PROGRESS NOTES
PCP [facility] appt scheduled with Dr. Jackie Monae on 11/15/2018 at 1:00pm Appt added to AVS. MARICEL Foley CM Specialist

## 2018-11-13 NOTE — PROGRESS NOTES
Problem: Mobility Impaired (Adult and Pediatric) Goal: *Acute Goals and Plan of Care (Insert Text) Physical Therapy Goals Initiated 11/10/2018 1. Patient will move from supine to sit and sit to supine , scoot up and down and roll side to side in bed with independence within 7 day(s). 2.  Patient will transfer from bed to chair and chair to bed with modified independence using the least restrictive device within 7 day(s). 3.  Patient will perform sit to stand with modified independence within 7 day(s). 4.  Patient will ambulate with modified independence for 250 feet with the least restrictive device within 7 day(s). 5.  Patient will ascend/descend 4 stairs with 1 handrail(s) with supervision/set-up within 7 day(s). physical Therapy TREATMENT Patient: Orlando Goldman (41 y.o. male) Date: 11/13/2018 Diagnosis: Acute respiratory failure with hypoxia (HCC) Acute respiratory failure with hypoxia (HCC) Acute respiratory failure with hypoxia ([address] Precautions: Bed Alarm, Fall Chart, physical therapy assessment, plan of care and goals were reviewed. ASSESSMENT: 
Pt received sitting in bedside chair and agreeable to PT intervention. Pt cleared by nursing for mobility and VSS at rest on RA. Pt reports that he ambulated in hallway with RW and nursing supervision yesterday, noting good tolerance. Pt with good progress towards therapy goals and is getting closer to his baseline mobility. Transfers completed with SBA, however requires increased time and BUE support on armrests to complete. He ambulated 220 ft with CGA/SBA and RW support, however demonstrates baseline antalgia on R, slow gait speed, narrowed FLAQUITA, and increased trunk flexion throughout. No overt LOB noted. Pt declined stair training, noting feeling confident on 3 DU[address] house with SERAFIN railings. Educated pt on having wife present for safety on steps.  Pt did not need any rest breaks with activity, however with audible SOB during initial gait training, however this improved with increased activity. Pt was left sitting in bedside chair with all needs met, RN aware, and VSS following therapy session. Recommend pt return home with family support, use of RW, and HHPT at discharge. Progression toward goals: 
[x]    Improving appropriately and progressing toward goals 
[]    Improving slowly and progressing toward goals 
[]    Not making progress toward goals and plan of care will be adjusted PLAN: 
Patient continues to benefit from skilled intervention to address the above impairments. Continue treatment per established plan of care. Discharge Recommendations:  Home Health Further Equipment Recommendations for Discharge:  None - Pt uses RW at baseline SUBJECTIVE:  
Patient stated I go faster at home, because I have the three wheeled walker.  OBJECTIVE DATA SUMMARY:  
Critical Behavior: 
Neurologic State: Alert, Appropriate for age, Eyes open spontaneously Orientation Level: Appropriate for age, Oriented X4 Cognition: Appropriate decision making, Appropriate for age attention/concentration, Appropriate safety awareness, Follows commands Safety/Judgement: Awareness of environment, Fall prevention, Home safety Functional Mobility Training: 
Bed Mobility: 
  
  
  
Scooting: Supervision Transfers: 
Sit to Stand: Stand-by assistance; Additional time Stand to Sit: Stand-by assistance; Additional time Bed to Chair: Stand-by assistance Balance: 
Sitting: Intact Standing: Impaired Standing - Static: Good;Constant support Standing - Dynamic : FairAmbulation/Gait Training: 
Distance (ft): 220 Feet (ft) Assistive Device: Gait belt;Walker, rolling Ambulation - Level of Assistance: Contact guard assistance;Stand-by assistance;Assist x1;Additional time; Adaptive equipment Gait Abnormalities: Decreased step clearance; Antalgic Base of Support: Narrowed Speed/Tammi: Pace decreased (<100 feet/min) Step Length: Left shortened;Right shortened Pain: 
Pain Scale 1: Numeric (0 - 10) Pain Intensity 1: 0 Activity Tolerance:  
Good/improving - increased gait distance; VSS at rest on RA; audible SOB with initial gait training, however improved with increased activity Please refer to the flowsheet for vital signs taken during this treatment. After treatment:  
[x]    Patient left in no apparent distress sitting up in chair 
[]    Patient left in no apparent distress in bed 
[x]    Call bell left within reach [x]    Nursing notified 
[]    Caregiver present 
[]    Bed alarm activated COMMUNICATION/COLLABORATION:  
The patients plan of care was discussed with: Physical Therapist, Occupational Therapist and Registered Nurse Ta Roy PT, DPT Time Calculation: 14 mins

## 2018-11-13 NOTE — PROGRESS NOTES
ALEXA avendaño and aware of patient's dcp. At [address] aware patient is being discharged today and will follow up with him for therapy and nursing.

## 2018-11-13 NOTE — PROGRESS NOTES
[address] [place] [address] 
[phone] Medical Progress Note NAME: Saul Maciel :  1931 MRM:  [id number] Date/Time: 2018  7:53  AM 
  
Subjective:  
 
Admitted with acute respiratory failure, PNA, CHF, rapid afib, hyponatremia, metastatic melanoma and adrenal insufficiency. On  Oral amiodarone -  PAF converted to NSR. Has diuresed  and feels much better with improvement of SOB. Afebrile. Past Medical History:  
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 hodges, radiologist  
 Atrial flutter [facility])   
 s/p cardioversion  Cancer [facility])  LEFT FOOT ,MELANOMA  Cancer ([address]  RIGHT KIDNEY  
 Cancer [facility])  PROSTATE  TREATED WITH RADIATION  Chronic pain BACK AND RIGHT HIP  
 Diabetes mellitus, stable ([address] 2017 Steroid induced  Hyperglycemia 2017 Steroid induced: follow up A1C, BMP, discontinue Starlix in wnl  LBP (low back pain)  Left hip pain 2017 Xray shows no obvious Fx or Metastatic lesions, this pain could be arthritic in nature or radicular, will give a corticosteroid injection, if no benefit may need Ortho eval  
 Malignant neoplasm of prostate ([address] 2017  OA (osteoarthritis)  Prostate cancer ([address]  Pure hyperglyceridemia 2017  Renal cell cancer (right)  Small bowel obstruction ([address] 2017 ROS:  General: negative for fever, chills, sweats, weakness Respiratory:  positive for improvement in SOB Cardiology:  negative for chest pain, palpitations, orthopnea, PND, edema, syncope Gastrointestinal: negative for abdominal pain, N/V, dysphagia, change in bowel habits, bleeding Genitourinary: negative for frequency, urgency, dysuria, hematuria, incontinence Muskuloskeletal : negative for arthralgia, myalgia Dermatological: negative for rash, ulceration, mole change, new lesion Neurological: negative for headache, dizziness, confusion, focal weakness, paresthesia, memory loss, gait disturbance Psychological: negative for anxiety, depression, agitation Review of systems otherwise negative Objective:  
 
 
Vitals:  
 
  
Last 24hrs VS reviewed since prior progress note. Most recent are: 
 
Visit Vitals BP 96/67 Pulse (!) 112 Temp 98.2 °F (36.8 °C) Resp 18 Ht 5' 8\" (1.727 m) Wt 172 lb 9.9 oz (78.3 kg) SpO2 96% BMI 26.25 kg/m² SpO2 Readings from Last 6 Encounters:  
11/13/18 96% 11/07/18 98% 11/03/18 95% 10/31/18 92% 01/19/18 96% 01/10/18 97% O2 Flow Rate (L/min): 2 l/min Intake/Output Summary (Last 24 hours) at 11/13/2018 5843 Last data filed at 11/13/2018 9908 Gross per 24 hour Intake 720 ml Output 775 ml Net -55 ml Telemetry reviewed:   AFIB Tubes:   n/a 
X-Ray:  Chest xray - Development of a mild pulmonary edema pattern CT chest -Mild pulmonary edema with an upper lobe distribution. No definite infiltrate. Follow-up to resolution recommended. Procedures:   N/A Exam:  
 
General   Thin 81 y/o male, alert and in NAD Respiratory   CTA bilaterally, no rales or ronchi 
Cardiology  Slightly irregular without murmur Abdominal  Soft, non-tender, non-distended, positive bowel sounds, no hepatosplenomegaly Extremities  No clubbing, cyanosis, or edema. Pulses intact. Skin  Normal skin turgor. No rashes or skin ulcers noted Neurological  No focal neurological deficits noted Psychological  Oriented x 3. Normal affect. Exam otherwise negative Lab Data Reviewed: (see below) Medications Reviewed: (see below) 
 
______________________________________________________________________ Medications:  
 
Current Facility-Administered Medications Medication Dose Route Frequency  hydrocortisone (CORTEF) tablet 10 mg  10 mg Oral BID WITH MEALS  
 amiodarone (CORDARONE) tablet 400 mg  400 mg Oral DAILY  sodium chloride (NS) flush 5-10 mL  5-10 mL IntraVENous PRN  
 levoFLOXacin (LEVAQUIN) 750 mg in D5W IVPB  750 mg IntraVENous Q48H  
 apixaban (ELIQUIS) tablet 2.5 mg  2.5 mg Oral BID  latanoprost (XALATAN) 0.005 % ophthalmic solution 1 Drop  1 Drop Both Eyes QHS  nateglinide (STARLIX) 120 mg tablet 60 mg  60 mg Oral TIDAC  insulin lispro (HUMALOG) injection   SubCUTAneous AC&HS  
 glucose chewable tablet 16 g  4 Tab Oral PRN  
 dextrose (D50W) injection syrg 12.5-25 g  12.5-25 g IntraVENous PRN  
 glucagon (GLUCAGEN) injection 1 mg  1 mg IntraMUSCular PRN  
 traMADol (ULTRAM) tablet 25-50 mg  25-50 mg Oral Q8H PRN  
 sodium chloride (NS) flush 5-10 mL  5-10 mL IntraVENous Q8H  
 sodium chloride (NS) flush 5-10 mL  5-10 mL IntraVENous PRN  
 acetaminophen (TYLENOL) tablet 650 mg  650 mg Oral Q6H PRN  
 ondansetron (ZOFRAN) injection 4 mg  4 mg IntraVENous Q4H PRN  
 furosemide (LASIX) injection 20 mg  20 mg IntraVENous BID Lab Review:  
 
Recent Labs 11/13/18 
0127 11/12/18 
0359 11/11/18 
8401 WBC 7.9 9.3 9.4 HGB 12.1 11.7* 11.8* HCT 37.2 35.7* 35.5*  221 198 Recent Labs 11/13/18 
0127 11/12/18 
0359 11/11/18 
7049  135* 136  
K 4.2 4.3 3.9  103 103 CO2 26 24 24 * 202* 227* BUN 35* 38* 30* CREA 1.23 1.33* 1.24  
CA 7.8* 7.4* 7.4* MG  --  2.2  --   
 
Lab Results Component Value Date/Time Glucose (POC) 96 11/13/2018 07:15 AM  
 Glucose (POC) 257 (H) 11/12/2018 09:22 PM  
 Glucose (POC) 115 (H) 11/12/2018 04:21 PM  
 Glucose (POC) 129 (H) 11/12/2018 11:20 AM  
 Glucose (POC) 154 (H) 11/12/2018 07:29 AM  
 
No results for input(s): PH, PCO2, PO2, HCO3, FIO2 in the last 72 hours. No results for input(s): INR in the last 72 hours. No lab exists for component: INREXT, INREXT Assessment:  
 
Principal Problem: 
  Acute respiratory failure with hypoxia ([facility] (11/9/2018) Active Problems: A-fib ([facility] (9/20/2012) Melanoma ([facility] (1/13/2015) CHF (congestive heart failure) ([facility] (8/9/2016) Pneumonia (8/10/2016) Malignant neoplasm of prostate ([facility] (7/13/2017) Adrenal insufficiency (Jose's disease) ([facility] (11/4/2018) Hyponatremia (11/4/2018) Plan:  
 
Risk of deterioration: medium 1. Resume po diuretic daily, CXR clear 2. Continue amiodarone 3. Maintenance steroid for adrenal insufficiency 4. Finished IV Levaquin 5. SSI coverage 6. Follow labs stable 7. Appreciate cardiology evaluation and recs 8. Home with At [facility] 9. Re: CDMP query, admitted with acute hypoxic respiratory failure secondary to pulmonary edema from atrial fibrillation with RVR responded to IV diuresis, IV amiodarone Care Plan discussed with: Patient Discussed:  Care Plan Prophylaxis:  Eliquis Disposition:  Home w/Family 
        
___________________________________________________ Attending Physician: Eve Moffett MD

## 2018-11-13 NOTE — DISCHARGE INSTRUCTIONS
[address]  [phone]      Patient Discharge Instructions    Sandra Glynn / [id number] : 1931    Admitted 2018 Discharged: 18       Take Home Medications            · It is important that you take the medication exactly as they are prescribed. · Keep your medication in the bottles provided by the pharmacist and keep a list of the medication names, dosages, and times to be taken in your wallet. · Do not take other medications without consulting your doctor. What to do at Home    Recommended diet: Diabetic Diet,     Recommended activity: Activity as tolerated,       Follow-up with Dr. Karthik Gaviria in 2 days. Call [phone] for your appointment. Information obtained by :  I understand that if any problems occur once I am at home I am to contact my physician. I understand and acknowledge receipt of the instructions indicated above.                                                                                                                                            Physician's or R.N.'s Signature                                                                  Date/Time                                                                                                                                              Patient or Representative Signature                                                          Date/Time

## 2018-11-13 NOTE — ROUTINE PROCESS
Reviewed discharge paperwork with patient and wife, including medication changes and follow up appointments. Paperwork placed in folder and given to wife. Removed all PIVs, held pressure, and bandaged. De-accessed port after flushing with heparin. Pt escorted to [facility] via wheelchair, no acute events

## 2018-11-15 ENCOUNTER — OFFICE VISIT (OUTPATIENT)
Dept: INTERNAL MEDICINE CLINIC | Age: 83
End: 2018-11-15

## 2018-11-15 VITALS
OXYGEN SATURATION: 92 % | SYSTOLIC BLOOD PRESSURE: 92 MMHG | HEIGHT: 68 IN | DIASTOLIC BLOOD PRESSURE: 58 MMHG | HEART RATE: 112 BPM | RESPIRATION RATE: 16 BRPM | WEIGHT: 176.8 LBS | TEMPERATURE: 98 F | BODY MASS INDEX: 26.8 KG/M2

## 2018-11-15 DIAGNOSIS — E11.21 TYPE 2 DIABETES WITH NEPHROPATHY (HCC): ICD-10-CM

## 2018-11-15 DIAGNOSIS — E27.1 ADRENAL INSUFFICIENCY (ADDISON'S DISEASE) (HCC): ICD-10-CM

## 2018-11-15 DIAGNOSIS — I48.0 PAROXYSMAL ATRIAL FIBRILLATION (HCC): Chronic | ICD-10-CM

## 2018-11-15 LAB
BACTERIA SPEC CULT: NORMAL
BACTERIA SPEC CULT: NORMAL
SERVICE CMNT-IMP: NORMAL
SERVICE CMNT-IMP: NORMAL

## 2018-11-15 NOTE — PROGRESS NOTES
This note will not be viewable in [address] Abner Fuller is a 80 y.o. male and presents with Hospital Follow Up (in [address] for respiratory failure) Ivana Nuñez Subjective: 
Mr. Ladan Sauer presents today for transition of care follow-up from hospitalization from 9 November - 13 November. Follow-up after being admitted for acute hypoxic respiratory failure secondary to pulmonary edema from underlying atrial fibrillation with rapid ventricular response. The patient was placed on IV amiodarone and diuresed and his breathing improved dramatically. He will have a follow-up BMP today as his blood pressure is a little low and he has been taking his furosemide daily whereas he had been on this 3 times weekly prior. He has not had any recurring shortness of breath chest pain palpitations PND orthopnea or pedal edema. Review of Systems Constitutional:  
Eyes:   negative for visual disturbance and irritation ENT:   negative for tinnitus,sore throat,nasal congestion,ear pains. hoarseness Respiratory:  negative for cough, hemoptysis, dyspnea,wheezing CV:   negative for chest pain, palpitations, lower extremity edema GI:   negative for nausea, vomiting, diarrhea, abdominal pain,melena Endo:               negative for polyuria,polydipsia,polyphagia,heat intolerance Genitourinary: negative for frequency, dysuria and hematuria Integumentary: negative for rash and pruritus Hematologic:  negative for easy bruising and gum/nose bleeding Musculoskel: negative for myalgias, arthralgias, back pain, muscle weakness, joint pain Neurological:  negative for headaches, dizziness, vertigo, memory problems and gait Behavl/Psych: negative for feelings of anxiety, depression, mood changes Past Medical History:  
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 hodges, radiologist  
 Atrial flutter [facility])   
 s/p cardioversion  Cancer [facility]) 2013 LEFT FOOT ,MELANOMA  Cancer ([facility] 1980 RIGHT KIDNEY  
 Cancer [facility]) 1988 PROSTATE  TREATED WITH RADIATION  Chronic pain BACK AND RIGHT HIP  
 Diabetes mellitus, stable ([address] 2017 Steroid induced  Hyperglycemia 2017 Steroid induced: follow up A1C, BMP, discontinue Starlix in wnl  LBP (low back pain)  Left hip pain 2017 Xray shows no obvious Fx or Metastatic lesions, this pain could be arthritic in nature or radicular, will give a corticosteroid injection, if no benefit may need Ortho eval  
 Malignant neoplasm of prostate ([address] 2017  OA (osteoarthritis)  Prostate cancer ([address]  Pure hyperglyceridemia 2017  Renal cell cancer (right)  Small bowel obstruction ([address] 2017 Past Surgical History:  
Procedure Laterality Date  [facility] MITRAL VALVE REPAIR  
 [address]  HX CATARACT REMOVAL Bilateral   HX LUMBAR LAMINECTOMY    
 HX ORTHOPAEDIC    
 CARPAL TUNNEL-SERAFIN  HX OTHER SURGICAL  OCT 2014 MELANOMA REMOVED FROM HEEL  
 [facility] TURP  
 HX UROLOGICAL Right NEPHRECTOMY  NH COLSC FLX W/REMOVAL LESION BY HOT BX FORCEPS  2012  NH SIGMOIDOSCOPY FLX DX W/COLLJ SPEC BR/WA IF PFRMD  2012 Social History Socioeconomic History  Marital status:  Spouse name: Not on file  Number of children: Not on file  Years of education: Not on file  Highest education level: Not on file Social Needs  Financial resource strain: Not on file  Food insecurity - worry: Not on file  Food insecurity - inability: Not on file  Transportation needs - medical: Not on file  Transportation needs - non-medical: Not on file Occupational History  Not on file Tobacco Use  Smoking status: Former Smoker Packs/day: 0.25 Years: 2.00 Pack years: 0.50 Last attempt to quit: 1953 Years since quittin.6  Smokeless tobacco: Never Used Substance and Sexual Activity  Alcohol use: No  
 Drug use: No  
 Sexual activity: Not on file Other Topics Concern  Not on file Social History Narrative  Not on file Family History Problem Relation Age of Onset  Diabetes Father  Dementia Mother  Cancer Sister BRAIN  
 Heart Disease Brother  Arthritis-osteo Sister  Anesth Problems Neg Hx Current Outpatient Medications Medication Sig Dispense Refill  amiodarone (PACERONE) 400 mg tablet Take 1 Tab by mouth daily. 30 Tab 1  
 furosemide (LASIX) 20 mg tablet Take 1 Tab by mouth daily. 30 Tab 3  
 traMADol (ULTRAM) 50 mg tablet Take 25-50 mg by mouth every eight (8) hours as needed for Pain.  calcium carbonate (CALCIUM 600) 600 mg calcium (1,500 mg) tablet Take 600 mg by mouth daily.  nateglinide (STARLIX) 60 mg tablet TAKE 1 TABLET BY MOUTH 3 TIMES A DAY WITH MEALS 90 Tab 3  
 hydrocortisone (CORTEF) 10 mg tablet TAKE 1 TABLET BY MOUTH TWICE A DAY. now daily  10  
 PREVIDENT 5000 BOOSTER PLUS 1.1 % pste BRUSH AFTER BREAKFAST AND SUPPER CAN ALSO USE IN FLUORIDE TRAY  12  
 diclofenac (VOLTAREN) 1 % gel Apply 2 g to affected area four (4) times daily as needed. 100 g 0  
 bimatoprost (LUMIGAN) 0.01 % ophthalmic drops Administer 1 Drop to both eyes nightly.  cholecalciferol (VITAMIN D3) 1,000 unit tablet Take 1,000 Units by mouth daily.  apixaban (ELIQUIS) 5 mg tablet Take 2.5 mg by mouth two (2) times a day.  acetaminophen (TYLENOL EXTRA STRENGTH) 500 mg tablet Take 500 mg by mouth every six (6) hours as needed for Pain. Allergies Allergen Reactions  Relafen [Nabumetone] Itching Objective: 
Visit Vitals BP 92/58 (BP 1 Location: Left arm, BP Patient Position: Sitting) Pulse (!) 112 Temp 98 °F (36.7 °C) (Oral) Resp 16 Ht 5' 8\" (1.727 m) Wt 176 lb 12.8 oz (80.2 kg) SpO2 92% BMI 26.88 kg/m² Physical Exam: General appearance - alert, well appearing, and in no distress Mental status - alert, oriented to person, place, and time EYE-JASMINE, EOMI, fundi normal, corneas normal, no foreign bodies ENT-ENT exam normal, no neck nodes or sinus tenderness Nose - normal and patent, no erythema, discharge or polyps Mouth - mucous membranes moist, pharynx normal without lesions Neck - supple, no significant adenopathy Chest - clear to auscultation, no wheezes, rales or rhonchi, symmetric air entry Heart - normal rate, regular rhythm, normal S1, S2, no murmurs, rubs, clicks or gallops Abdomen - soft, nontender, nondistended, no masses or organomegaly Lymph- no adenopathy palpable Ext-peripheral pulses normal, no pedal edema, no clubbing or cyanosis Skin-Warm and dry. no hyperpigmentation, vitiligo, or suspicious lesions Neuro -alert, oriented, normal speech, no focal findings or movement disorder noted Musculoskeletal- FROM, no bony abnormalities, no point tenderness No results found for this visit on 11/15/18. All results for lab orders may not have been returned by the time this encountered was closed. Assessment/Plan: ICD-10-CM ICD-9-CM 1. Transition of care performed with sharing of clinical summary Z91.89 V15.89 2. Paroxysmal atrial fibrillation (HCC) I48.0 427.31   
3. Adrenal insufficiency (Stearns's disease) ([facility] E27.1 255.41   
4. Type 2 diabetes with nephropathy ([facility]) I35.78 367.32 METABOLIC PANEL, BASIC  
  583.81 Orders Placed This Encounter  METABOLIC PANEL, BASIC Plan: The patient will have a follow-up BMP today. May need to cut back on his frequency if his creatinine is elevated or his blood pressure drops any further. His heart rate appears to be in atrial fibrillation at this time but his rate is controlled.   He has been on amiodarone 400 mg a daily however he received a loading dose in the hospital and was previously on 100 mg daily. I will drop his dose to 200 mg daily and monitor his response to therapy. He has a follow-up scheduled with Dr. Enrique Mena after the first of the year. He will call if he has any untoward symptoms in the interim. Follow-up Disposition: 
Return in about 1 month (around 12/15/2018). I have reviewed with the patient details of the assessment and plan and all questions were answered. Relevent patient education was performed. Verbal and/or written instructions (see AVS) provided. The most recent lab findings were reviewed with the patient. Plan was discussed with patient who verbal expressed understanding. An After Visit Summary was printed and given to the patient.  
 
 
Sun Ronquillo MD

## 2018-11-15 NOTE — PROGRESS NOTES
Chief Complaint Patient presents with  
[facility] Follow Up  
  in ED [facility] for respiratory failure 1. Have you been to the ER, urgent care clinic since your last visit? Hospitalized since your last visit? Yes ED [facility] 2. Have you seen or consulted any other health care providers outside of the [address] [place] [address] since your last visit? Include any pap smears or colon screening.   no

## 2018-11-16 LAB
BUN SERPL-MCNC: 27 MG/DL (ref 8–27)
BUN/CREAT SERPL: 23 (ref 10–24)
CALCIUM SERPL-MCNC: 9 MG/DL (ref 8.6–10.2)
CHLORIDE SERPL-SCNC: 96 MMOL/L (ref 96–106)
CO2 SERPL-SCNC: 24 MMOL/L (ref 20–29)
CREAT SERPL-MCNC: 1.2 MG/DL (ref 0.76–1.27)
GLUCOSE SERPL-MCNC: 202 MG/DL (ref 65–99)
POTASSIUM SERPL-SCNC: 5.2 MMOL/L (ref 3.5–5.2)
SODIUM SERPL-SCNC: 135 MMOL/L (ref 134–144)

## 2018-11-19 ENCOUNTER — PATIENT OUTREACH (OUTPATIENT)
Dept: INTERNAL MEDICINE CLINIC | Age: 83
End: 2018-11-19

## 2018-11-19 NOTE — PROGRESS NOTES
Hospital Discharge Follow-Up Date/Time:  2018 12:37 PM 
 
Patient was admitted to [facility] on 18 and discharged on 18 for acute respiratory failure with hypoxia. The physician discharge summary was available at the time of outreach. Patient was seen  within two business days of discharge. Top Challenges reviewed with the provider Method of communication with provider :chart routing Inpatient RRAT score: 29 Was this a readmission? yes Patient stated reason for the readmission: shortness of breath Nurse Navigator (NN) contacted the family by telephone to perform post hospital discharge assessment. Verified name and  with family as identifiers. Provided introduction to self, and explanation of the Nurse Navigator role. Reviewed discharge instructions and red flags with family who verbalized understanding. Family given an opportunity to ask questions and does not have any further questions or concerns at this time. The family agrees to contact the PCP office for questions related to their healthcare. NN provided contact information for future reference. Disease Specific:   CHF Summary of patient's top problems: 1. Readmitted for acute respiratory failure secondary to pulmonary edema. Had been hospitalized within past 30 days for pneumonia and a fib with RVR. Medications adjusted and now back at home. Weight stable and no shortness of breath currently. Home Health orders at discharge: PT [address]: At [facility] Date of initial visit: 18 Durable Medical Equipment ordered/company: n/a Durable Medical Equipment received: n/a Barriers to care? none Advance Care Planning:  
Does patient have an Advance Directive:  reviewed and current Medication(s):  
New Medications at Discharge: n/a Changed Medications at Discharge: amiodarone, furosemide Discontinued Medications at Discharge: levaquin Medication reconciliation was performed with patient, who verbalizes understanding of administration of home medications. There were no barriers to obtaining medications identified at this time. Referral to Pharm D needed: no  
 
Current Outpatient Medications Medication Sig  
 amiodarone (PACERONE) 400 mg tablet Take 1 Tab by mouth daily.  furosemide (LASIX) 20 mg tablet Take 1 Tab by mouth daily.  traMADol (ULTRAM) 50 mg tablet Take 25-50 mg by mouth every eight (8) hours as needed for Pain.  calcium carbonate (CALCIUM 600) 600 mg calcium (1,500 mg) tablet Take 600 mg by mouth daily.  nateglinide (STARLIX) 60 mg tablet TAKE 1 TABLET BY MOUTH 3 TIMES A DAY WITH MEALS  
 hydrocortisone (CORTEF) 10 mg tablet TAKE 1 TABLET BY MOUTH TWICE A DAY. now daily  PREVIDENT 5000 BOOSTER PLUS 1.1 % pste BRUSH AFTER BREAKFAST AND SUPPER CAN ALSO USE IN FLUORIDE TRAY  diclofenac (VOLTAREN) 1 % gel Apply 2 g to affected area four (4) times daily as needed.  bimatoprost (LUMIGAN) 0.01 % ophthalmic drops Administer 1 Drop to both eyes nightly.  cholecalciferol (VITAMIN D3) 1,000 unit tablet Take 1,000 Units by mouth daily.  apixaban (ELIQUIS) 5 mg tablet Take 2.5 mg by mouth two (2) times a day.  acetaminophen (TYLENOL EXTRA STRENGTH) 500 mg tablet Take 500 mg by mouth every six (6) hours as needed for Pain. No current facility-administered medications for this visit. There are no discontinued medications. [facility] follow up appointment(s):  
Future Appointments Date Time Provider Larissa Hogue 12/7/2018  2:00 PM [facility] PET INJ 1 [facility] REG  
12/7/2018  3:00 PM [id number] PET 1 [facility] REG  
12/13/2018 11:00 AM Kenzie Jacques MD [facility] ARVIN SCHED  
3/6/2019  9:10 AM Kenzie Jacques MD [address] [place] [address],[facility] [address] Non[facility] follow up appointment(s): n/a [facility]:  n/a  
 
 
Goals  monitor for and prevent exacerbation of CHF 11/19/18- NN spoke with patient and his wife re: importance of daily weights to assess for fluid retention. Patient does have scales and states he will start to weigh daily at the same time each morning and report a 2-3 lb weight gain if that occurs within 24 hr period. In addition I discussed foods high in sodium to avoid and wife will be cognizant of sodium content of foods. Patient saw PCP on ROBBIN visit 11/15/18 within 48 hr of discharge.  NN will f/u with patient again in one week to see how he is progressing. / vs

## 2018-12-13 NOTE — PATIENT INSTRUCTIONS
Chief Complaint   Patient presents with   [facility] Follow Up     follow up    Medication Evaluation     Amiordarone     Patient is complaining of shortness of breath & weakness. Patient wants to know if it is the Amiordarone? 1. Have you been to the ER, urgent care clinic since your last visit? No   Hospitalized since your last visit? No     2. Have you seen or consulted any other health care providers outside of the [address] [place] [address] since your last visit?  No

## 2018-12-13 NOTE — PROGRESS NOTES
This note will not be viewable in [address] Orlando Goldman is a 80 y.o. male and presents with Hospital Follow Up (follow up) and Medication Evaluation (Amiordarone)  . Subjective:    Mr. Alena Cunningham presents today for follow-up of atrial fibrillation after having been hospitalized with acute pulmonary edema secondary to A. fib with rapid ventricular response. On his follow-up office visit after his hospitalization his blood pressure was low and he felt poorly. His lab work was stable so we decrease his amiodarone to 200 mg daily and cut back on his diuretic. He was doing well but states recently he has continued to have intermittent atrial fibrillation and increased shortness of breath with walking. He denies any PND orthopnea or pedal edema. Review of Systems  Constitutional:   Eyes:   negative for visual disturbance and irritation  ENT:   negative for tinnitus,sore throat,nasal congestion,ear pains. hoarseness  Respiratory:  negative for cough, hemoptysis, dyspnea,wheezing  CV:   negative for chest pain, palpitations, lower extremity edema  GI:   negative for nausea, vomiting, diarrhea, abdominal pain,melena  Endo:               negative for polyuria,polydipsia,polyphagia,heat intolerance  Genitourinary: negative for frequency, dysuria and hematuria  Integumentary: negative for rash and pruritus  Hematologic:  negative for easy bruising and gum/nose bleeding  Musculoskel: negative for myalgias, arthralgias, back pain, muscle weakness, joint pain  Neurological:  negative for headaches, dizziness, vertigo, memory problems and gait   Behavl/Psych: negative for feelings of anxiety, depression, mood changes    Past Medical History:   Diagnosis Date    Aneurysm ([facility] 10/1/14    BRAIN RECENT DX , followed by dr. hodges, radiologist    Atrial flutter [facility])     s/p cardioversion    Cancer ([address] 2013    LEFT FOOT ,MELANOMA    Cancer ([address] 1980    RIGHT KIDNEY    Cancer ([address] 1988    PROSTATE  TREATED WITH RADIATION    Chronic pain     BACK AND RIGHT HIP    Diabetes mellitus, stable ([address] 2017    Steroid induced    Hyperglycemia 2017    Steroid induced: follow up A1C, BMP, discontinue Starlix in wnl    LBP (low back pain)     Left hip pain 2017    Xray shows no obvious Fx or Metastatic lesions, this pain could be arthritic in nature or radicular, will give a corticosteroid injection, if no benefit may need Ortho eval    Malignant neoplasm of prostate ([address] 2017    OA (osteoarthritis)     Prostate cancer ([address]     Pure hyperglyceridemia 2017    Renal cell cancer (right)     Small bowel obstruction ([address] 2017     Past Surgical History:   Procedure Laterality Date    CARDIAC SURG PROCEDURE UNLIST      MITRAL VALVE REPAIR    HX APPENDECTOMY  1960    HX CATARACT REMOVAL Bilateral 2011    HX LUMBAR LAMINECTOMY      HX ORTHOPAEDIC      CARPAL TUNNEL-SERAFIN    HX OTHER SURGICAL  OCT 2014    MELANOMA REMOVED FROM HEEL    HX PROSTATECTOMY      TURP    HX UROLOGICAL Right     NEPHRECTOMY    [place] COLSC FLX W/REMOVAL LESION BY HOT BX FORCEPS  2012         [place] SIGMOIDOSCOPY FLX DX W/COLLJ SPEC BR/WA IF PFRMD  2012          Social History     Socioeconomic History    Marital status:      Spouse name: Not on file    Number of children: Not on file    Years of education: Not on file    Highest education level: Not on file   Tobacco Use    Smoking status: Former Smoker     Packs/day: 0.25     Years: 2.00     Pack years: 0.50     Last attempt to quit: 1953     Years since quittin.6    Smokeless tobacco: Never Used   Substance and Sexual Activity    Alcohol use: No    Drug use: No     Family History   Problem Relation Age of Onset    Diabetes Father     Dementia Mother     Cancer Sister         BRAIN    Heart Disease Brother     Arthritis-osteo Sister     Anesth Problems Neg Hx      Current Outpatient Medications   Medication Sig Dispense Refill    amiodarone (PACERONE) 400 mg tablet Take 1 Tab by mouth daily. 30 Tab 1    furosemide (LASIX) 20 mg tablet Take 1 Tab by mouth daily. 30 Tab 3    calcium carbonate (CALCIUM 600) 600 mg calcium (1,500 mg) tablet Take 600 mg by mouth daily.  nateglinide (STARLIX) 60 mg tablet TAKE 1 TABLET BY MOUTH 3 TIMES A DAY WITH MEALS 90 Tab 3    hydrocortisone (CORTEF) 10 mg tablet TAKE 1 TABLET BY MOUTH TWICE A DAY. now daily  10    PREVIDENT 5000 BOOSTER PLUS 1.1 % pste BRUSH AFTER BREAKFAST AND SUPPER CAN ALSO USE IN FLUORIDE TRAY  12    bimatoprost (LUMIGAN) 0.01 % ophthalmic drops Administer 1 Drop to both eyes nightly.  cholecalciferol (VITAMIN D3) 1,000 unit tablet Take 1,000 Units by mouth daily.  apixaban (ELIQUIS) 5 mg tablet Take 2.5 mg by mouth two (2) times a day.  traMADol (ULTRAM) 50 mg tablet Take 25-50 mg by mouth every eight (8) hours as needed for Pain.  diclofenac (VOLTAREN) 1 % gel Apply 2 g to affected area four (4) times daily as needed. 100 g 0    acetaminophen (TYLENOL EXTRA STRENGTH) 500 mg tablet Take 500 mg by mouth every six (6) hours as needed for Pain.        Allergies   Allergen Reactions    Relafen [Nabumetone] Itching       Objective:  Visit Vitals  /68 (BP 1 Location: Right arm, BP Patient Position: Sitting)   Pulse 95   Temp 97.7 °F (36.5 °C) (Oral)   Resp 20   Ht 5' 8\" (1.727 m)   Wt 180 lb 6.4 oz (81.8 kg)   SpO2 96%   BMI 27.43 kg/m²     Physical Exam:   General appearance - alert, well appearing, and in no distress  Mental status - alert, oriented to person, place, and time  EYE-JASMINE, EOMI, fundi normal, corneas normal, no foreign bodies  ENT-ENT exam normal, no neck nodes or sinus tenderness  Nose - normal and patent, no erythema, discharge or polyps  Mouth - mucous membranes moist, pharynx normal without lesions  Neck - supple, no significant adenopathy   Chest - clear to auscultation, few bibasilar rales  Heart - normal rate,irregular rhythm, normal S1, S2, no murmurs, rubs, clicks or gallops   Abdomen - soft, nontender, nondistended, no masses or organomegaly  Lymph- no adenopathy palpable  Ext-peripheral pulses normal, no pedal edema, no clubbing or cyanosis  Skin-Warm and dry. no hyperpigmentation, vitiligo, or suspicious lesions  Neuro -alert, oriented, normal speech, no focal findings or movement disorder noted  Musculoskeletal- FROM, no bony abnormalities, no point tenderness    No results found for this visit on 12/13/18. All results for lab orders may not have been returned by the time this encountered was closed. Assessment/Plan:       ICD-10-CM ICD-9-CM    1. Paroxysmal atrial fibrillation (HCC) I48.0 427.31 REFERRAL TO CARDIOLOGY       Orders Placed This Encounter    REFERRAL TO CARDIOLOGY     Referral Priority:   Routine     Referral Type:   Consultation     Referral Reason:   Specialty Services Required     Requested Specialty:   Cardiology     Number of Visits Requested:   1    amiodarone (PACERONE) 400 mg tablet     Sig: Take 1 Tab by mouth daily. Dispense:  30 Tab     Refill:  1     Note increase from 200 mg daily to 400mg daily   Plan:    Increase amiodarone to 400 mg daily. Continue furosemide daily. Follow-up labs. Will check to see if her BMP was done at [facility] earlier today. Follow-up Disposition:  Return in about 1 month (around 1/13/2019) for follow up. I have reviewed with the patient details of the assessment and plan and all questions were answered. Relevent patient education was performed. Verbal and/or written instructions (see AVS) provided. The most recent lab findings were reviewed with the patient. Plan was discussed with patient who verbal expressed understanding. An After Visit Summary was printed and given to the patient.       Wendy Simeon MD

## 2019-01-01 ENCOUNTER — ANESTHESIA (OUTPATIENT)
Dept: SURGERY | Age: 84
End: 2019-01-01
Payer: MEDICARE

## 2019-01-01 ENCOUNTER — PATIENT OUTREACH (OUTPATIENT)
Dept: INTERNAL MEDICINE CLINIC | Age: 84
End: 2019-01-01

## 2019-01-01 ENCOUNTER — APPOINTMENT (OUTPATIENT)
Dept: CT IMAGING | Age: 84
DRG: 064 | End: 2019-01-01
Attending: EMERGENCY MEDICINE
Payer: MEDICARE

## 2019-01-01 ENCOUNTER — HOSPITAL ENCOUNTER (OUTPATIENT)
Dept: PREADMISSION TESTING | Age: 84
Discharge: HOME | End: 2019-10-17
Attending: PLASTIC SURGERY

## 2019-01-01 ENCOUNTER — OFFICE VISIT (OUTPATIENT)
Dept: INTERNAL MEDICINE CLINIC | Age: 84
End: 2019-01-01

## 2019-01-01 ENCOUNTER — HOSPITAL ENCOUNTER (OUTPATIENT)
Dept: NUCLEAR MEDICINE | Age: 84
Discharge: HOME | End: 2019-11-13
Attending: INTERNAL MEDICINE
Payer: MEDICARE

## 2019-01-01 ENCOUNTER — APPOINTMENT (OUTPATIENT)
Dept: GENERAL RADIOLOGY | Age: 84
End: 2019-01-01
Attending: PHYSICIAN ASSISTANT
Payer: MEDICARE

## 2019-01-01 ENCOUNTER — HOSPITAL ENCOUNTER (OUTPATIENT)
Dept: NON INVASIVE DIAGNOSTICS | Age: 84
Discharge: HOME | End: 2019-02-13
Payer: MEDICARE

## 2019-01-01 ENCOUNTER — HOSPITAL ENCOUNTER (EMERGENCY)
Age: 84
Discharge: HOME | End: 2019-07-29
Attending: EMERGENCY MEDICINE
Payer: MEDICARE

## 2019-01-01 ENCOUNTER — APPOINTMENT (OUTPATIENT)
Dept: GENERAL RADIOLOGY | Age: 84
End: 2019-01-01
Attending: STUDENT IN AN ORGANIZED HEALTH CARE EDUCATION/TRAINING PROGRAM
Payer: MEDICARE

## 2019-01-01 ENCOUNTER — HOSPITAL ENCOUNTER (OUTPATIENT)
Dept: MRI IMAGING | Age: 84
Discharge: HOME | End: 2019-11-13
Attending: INTERNAL MEDICINE
Payer: MEDICARE

## 2019-01-01 ENCOUNTER — APPOINTMENT (OUTPATIENT)
Dept: GENERAL RADIOLOGY | Age: 84
DRG: 064 | End: 2019-01-01
Attending: EMERGENCY MEDICINE
Payer: MEDICARE

## 2019-01-01 ENCOUNTER — HOSPITAL ENCOUNTER (OUTPATIENT)
Age: 84
Setting detail: OUTPATIENT SURGERY
Discharge: HOME | End: 2019-10-25
Attending: PLASTIC SURGERY | Admitting: PLASTIC SURGERY
Payer: MEDICARE

## 2019-01-01 ENCOUNTER — ANESTHESIA EVENT (OUTPATIENT)
Dept: SURGERY | Age: 84
End: 2019-01-01
Payer: MEDICARE

## 2019-01-01 ENCOUNTER — APPOINTMENT (OUTPATIENT)
Dept: MRI IMAGING | Age: 84
End: 2019-01-01
Attending: INTERNAL MEDICINE
Payer: MEDICARE

## 2019-01-01 ENCOUNTER — HOSPITAL ENCOUNTER (OUTPATIENT)
Dept: PET IMAGING | Age: 84
Discharge: HOME | End: 2019-08-02
Attending: INTERNAL MEDICINE
Payer: MEDICARE

## 2019-01-01 ENCOUNTER — HOSPITAL ENCOUNTER (OUTPATIENT)
Dept: MRI IMAGING | Age: 84
Discharge: HOME | End: 2019-03-13
Attending: INTERNAL MEDICINE
Payer: MEDICARE

## 2019-01-01 ENCOUNTER — HOSPITAL ENCOUNTER (OUTPATIENT)
Dept: CT IMAGING | Age: 84
Discharge: HOME | End: 2019-01-23
Attending: INTERNAL MEDICINE
Payer: MEDICARE

## 2019-01-01 ENCOUNTER — APPOINTMENT (OUTPATIENT)
Dept: CT IMAGING | Age: 84
End: 2019-01-01
Attending: PHYSICIAN ASSISTANT
Payer: MEDICARE

## 2019-01-01 ENCOUNTER — HOSPITAL ENCOUNTER (INPATIENT)
Age: 84
LOS: 1 days | DRG: 064 | End: 2019-11-20
Attending: EMERGENCY MEDICINE | Admitting: INTERNAL MEDICINE
Payer: MEDICARE

## 2019-01-01 VITALS
DIASTOLIC BLOOD PRESSURE: 86 MMHG | WEIGHT: 181.3 LBS | HEART RATE: 87 BPM | RESPIRATION RATE: 16 BRPM | TEMPERATURE: 98 F | OXYGEN SATURATION: 96 % | SYSTOLIC BLOOD PRESSURE: 117 MMHG | BODY MASS INDEX: 27.48 KG/M2 | HEIGHT: 68 IN

## 2019-01-01 VITALS
WEIGHT: 174 LBS | BODY MASS INDEX: 25.77 KG/M2 | OXYGEN SATURATION: 95 % | HEIGHT: 69 IN | RESPIRATION RATE: 20 BRPM | HEART RATE: 60 BPM | DIASTOLIC BLOOD PRESSURE: 58 MMHG | SYSTOLIC BLOOD PRESSURE: 100 MMHG

## 2019-01-01 VITALS
WEIGHT: 173 LBS | SYSTOLIC BLOOD PRESSURE: 124 MMHG | RESPIRATION RATE: 16 BRPM | TEMPERATURE: 97.9 F | HEART RATE: 60 BPM | BODY MASS INDEX: 26.22 KG/M2 | OXYGEN SATURATION: 95 % | HEIGHT: 68 IN | DIASTOLIC BLOOD PRESSURE: 62 MMHG

## 2019-01-01 VITALS
BODY MASS INDEX: 26.3 KG/M2 | DIASTOLIC BLOOD PRESSURE: 60 MMHG | HEART RATE: 62 BPM | SYSTOLIC BLOOD PRESSURE: 130 MMHG | WEIGHT: 173.5 LBS | TEMPERATURE: 98 F | RESPIRATION RATE: 17 BRPM | OXYGEN SATURATION: 95 % | HEIGHT: 68 IN

## 2019-01-01 VITALS
RESPIRATION RATE: 32 BRPM | HEIGHT: 68 IN | OXYGEN SATURATION: 86 % | DIASTOLIC BLOOD PRESSURE: 46 MMHG | SYSTOLIC BLOOD PRESSURE: 58 MMHG | TEMPERATURE: 98.3 F | HEART RATE: 98 BPM | BODY MASS INDEX: 26.38 KG/M2

## 2019-01-01 VITALS
TEMPERATURE: 98.1 F | HEART RATE: 64 BPM | DIASTOLIC BLOOD PRESSURE: 70 MMHG | BODY MASS INDEX: 28.09 KG/M2 | HEIGHT: 66 IN | OXYGEN SATURATION: 96 % | RESPIRATION RATE: 16 BRPM | SYSTOLIC BLOOD PRESSURE: 126 MMHG | WEIGHT: 174.8 LBS

## 2019-01-01 VITALS
BODY MASS INDEX: 27.03 KG/M2 | TEMPERATURE: 98 F | RESPIRATION RATE: 15 BRPM | SYSTOLIC BLOOD PRESSURE: 135 MMHG | HEIGHT: 68 IN | HEART RATE: 58 BPM | OXYGEN SATURATION: 100 % | WEIGHT: 178.35 LBS | DIASTOLIC BLOOD PRESSURE: 61 MMHG

## 2019-01-01 VITALS
TEMPERATURE: 97.6 F | HEIGHT: 66 IN | RESPIRATION RATE: 18 BRPM | SYSTOLIC BLOOD PRESSURE: 134 MMHG | WEIGHT: 170 LBS | DIASTOLIC BLOOD PRESSURE: 67 MMHG | OXYGEN SATURATION: 95 % | BODY MASS INDEX: 27.32 KG/M2 | HEART RATE: 55 BPM

## 2019-01-01 VITALS — HEIGHT: 66 IN | WEIGHT: 173 LBS | BODY MASS INDEX: 27.8 KG/M2

## 2019-01-01 DIAGNOSIS — E11.21 TYPE 2 DIABETES WITH NEPHROPATHY (HCC): ICD-10-CM

## 2019-01-01 DIAGNOSIS — R26.81 UNSTEADY GAIT: ICD-10-CM

## 2019-01-01 DIAGNOSIS — Z90.710 VAGINAL PAP SMEAR FOLLOWING HYSTERECTOMY FOR MALIGNANCY: ICD-10-CM

## 2019-01-01 DIAGNOSIS — C78.7 SECONDARY MALIGNANT NEOPLASM OF LIVER (HCC): ICD-10-CM

## 2019-01-01 DIAGNOSIS — R06.00 DYSPNEA AND RESPIRATORY ABNORMALITY: ICD-10-CM

## 2019-01-01 DIAGNOSIS — R06.89 DYSPNEA AND RESPIRATORY ABNORMALITY: ICD-10-CM

## 2019-01-01 DIAGNOSIS — Z08 VAGINAL PAP SMEAR FOLLOWING HYSTERECTOMY FOR MALIGNANCY: ICD-10-CM

## 2019-01-01 DIAGNOSIS — I48.0 PAROXYSMAL ATRIAL FIBRILLATION (HCC): ICD-10-CM

## 2019-01-01 DIAGNOSIS — C43.71 MALIGNANT MELANOMA OF RIGHT LOWER EXTREMITY INCLUDING HIP (HCC): ICD-10-CM

## 2019-01-01 DIAGNOSIS — N39.0 URINARY TRACT INFECTION, SITE NOT SPECIFIED: ICD-10-CM

## 2019-01-01 DIAGNOSIS — Z00.00 MEDICARE ANNUAL WELLNESS VISIT, SUBSEQUENT: Primary | ICD-10-CM

## 2019-01-01 DIAGNOSIS — Z13.31 SCREENING FOR DEPRESSION: ICD-10-CM

## 2019-01-01 DIAGNOSIS — Z51.12 ENCOUNTER FOR ANTINEOPLASTIC IMMUNOTHERAPY: ICD-10-CM

## 2019-01-01 DIAGNOSIS — I48.0 PAROXYSMAL ATRIAL FIBRILLATION (HCC): Primary | Chronic | ICD-10-CM

## 2019-01-01 DIAGNOSIS — I32 PERICARDITIS SECONDARY TO TUMOR METASTATIC TO PERICARDIUM (HCC): ICD-10-CM

## 2019-01-01 DIAGNOSIS — I50.9 ACUTE CONGESTIVE HEART FAILURE, UNSPECIFIED HEART FAILURE TYPE (HCC): ICD-10-CM

## 2019-01-01 DIAGNOSIS — C44.1192 BASAL CELL CARCINOMA (BCC) OF LEFT LOWER EYELID: Primary | ICD-10-CM

## 2019-01-01 DIAGNOSIS — C79.51 SECONDARY MALIGNANT NEOPLASM OF BONE AND BONE MARROW (HCC): ICD-10-CM

## 2019-01-01 DIAGNOSIS — C79.70 SECONDARY MALIGNANT NEOPLASM OF ADRENAL GLAND (HCC): ICD-10-CM

## 2019-01-01 DIAGNOSIS — Z13.39 SCREENING FOR ALCOHOLISM: ICD-10-CM

## 2019-01-01 DIAGNOSIS — Z08 ENCNTR FOR FOLLOW-UP EXAM AFTER TRTMT FOR MALIGNANT NEOPLASM: ICD-10-CM

## 2019-01-01 DIAGNOSIS — R06.00 DYSPNEA: ICD-10-CM

## 2019-01-01 DIAGNOSIS — E27.40 ADRENAL INSUFFICIENCY (HCC): ICD-10-CM

## 2019-01-01 DIAGNOSIS — G93.40 ENCEPHALOPATHY: ICD-10-CM

## 2019-01-01 DIAGNOSIS — C79.52 SECONDARY MALIGNANT NEOPLASM OF BONE AND BONE MARROW (HCC): ICD-10-CM

## 2019-01-01 DIAGNOSIS — E11.9 DIABETES MELLITUS, STABLE (HCC): ICD-10-CM

## 2019-01-01 DIAGNOSIS — I62.9 INTRACRANIAL HEMORRHAGE (HCC): Primary | ICD-10-CM

## 2019-01-01 DIAGNOSIS — C79.89 SECONDARY MALIGNANT NEOPLASM OF OTHER SPECIFIED SITES (HCC): ICD-10-CM
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
## 2019-01-01 DIAGNOSIS — C78.7 SECONDARY MALIGNANT NEOPLASM OF LIVER AND INTRAHEPATIC BILE DUCT (HCC): ICD-10-CM

## 2019-01-01 DIAGNOSIS — T14.8XXA ABRASION: ICD-10-CM

## 2019-01-01 DIAGNOSIS — Z08 ENCOUNTER FOR FOLLOW-UP EXAMINATION AFTER COMPLETED TREATMENT FOR MALIGNANT NEOPLASM: ICD-10-CM

## 2019-01-01 DIAGNOSIS — Z23 ENCOUNTER FOR IMMUNIZATION: ICD-10-CM

## 2019-01-01 DIAGNOSIS — C79.89 PERICARDITIS SECONDARY TO TUMOR METASTATIC TO PERICARDIUM (HCC): ICD-10-CM

## 2019-01-01 DIAGNOSIS — E27.1 ADRENAL INSUFFICIENCY (ADDISON'S DISEASE) (HCC): ICD-10-CM

## 2019-01-01 DIAGNOSIS — R06.00 DYSPNEA, UNSPECIFIED: ICD-10-CM

## 2019-01-01 DIAGNOSIS — Z79.899 ON STATIN THERAPY: ICD-10-CM

## 2019-01-01 DIAGNOSIS — I48.0 PAROXYSMAL ATRIAL FIBRILLATION (HCC): Chronic | ICD-10-CM

## 2019-01-01 DIAGNOSIS — C43.9 MALIGNANT MELANOMA, UNSPECIFIED SITE (HCC): ICD-10-CM

## 2019-01-01 DIAGNOSIS — J96.01 ACUTE RESPIRATORY FAILURE WITH HYPOXIA (HCC): ICD-10-CM

## 2019-01-01 DIAGNOSIS — C79.51 SECONDARY MALIGNANT NEOPLASM OF BONE (HCC): ICD-10-CM

## 2019-01-01 DIAGNOSIS — S49.91XA RIGHT SHOULDER INJURY, INITIAL ENCOUNTER: ICD-10-CM
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
## 2019-01-01 DIAGNOSIS — C79.70: ICD-10-CM

## 2019-01-01 LAB
A-G RATIO,AGRAT: 1.5 RATIO
A-G RATIO,AGRAT: 1.5 RATIO
ALBUMIN SERPL-MCNC: 3.5 G/DL (ref 3.5–5)
ALBUMIN SERPL-MCNC: 4.1 G/DL (ref 3.9–5.4)
ALBUMIN SERPL-MCNC: 4.2 G/DL (ref 3.9–5.4)
ALBUMIN/GLOB SERPL: 1.1 {RATIO} (ref 1.1–2.2)
ALP SERPL-CCNC: 102 U/L (ref 38–126)
ALP SERPL-CCNC: 65 U/L (ref 45–117)
ALP SERPL-CCNC: 85 U/L (ref 38–126)
ALT SERPL-CCNC: 18 U/L (ref 0–50)
ALT SERPL-CCNC: 18 U/L (ref 12–78)
ALT SERPL-CCNC: 25 U/L (ref 9–52)
ANION GAP SERPL CALC-SCNC: 12 MMOL/L
ANION GAP SERPL CALC-SCNC: 8 MMOL/L (ref 5–15)
ANION GAP SERPL CALC-SCNC: 9 MMOL/L
AST SERPL W P-5'-P-CCNC: 20 U/L (ref 14–36)
AST SERPL W P-5'-P-CCNC: 21 U/L (ref 14–36)
AST SERPL-CCNC: 15 U/L (ref 15–37)
ATRIAL RATE: 57 BPM
BASOPHILS # BLD: 0.1 K/UL (ref 0–0.1)
BASOPHILS NFR BLD: 1 % (ref 0–1)
BILIRUB SERPL-MCNC: 0.4 MG/DL (ref 0.2–1.3)
BILIRUB SERPL-MCNC: 0.5 MG/DL (ref 0.2–1)
BILIRUB SERPL-MCNC: 0.5 MG/DL (ref 0.2–1.3)
BILIRUB UR QL: NEGATIVE
BILIRUB UR QL: NEGATIVE
BUN SERPL-MCNC: 22 MG/DL (ref 9–20)
BUN SERPL-MCNC: 26 MG/DL (ref 6–20)
BUN SERPL-MCNC: 30 MG/DL (ref 9–20)
BUN/CREAT SERPL: 23 (ref 12–20)
BUN/CREATININE RATIO,BUCR: 20 RATIO
BUN/CREATININE RATIO,BUCR: 21 RATIO
CALCIUM SERPL-MCNC: 8.2 MG/DL (ref 8.5–10.1)
CALCIUM SERPL-MCNC: 9.3 MG/DL (ref 8.4–10.2)
CALCIUM SERPL-MCNC: 9.6 MG/DL (ref 8.4–10.2)
CALCULATED P AXIS, ECG09: 15 DEGREES
CALCULATED R AXIS, ECG10: -57 DEGREES
CALCULATED T AXIS, ECG11: 7 DEGREES
CHLORIDE SERPL-SCNC: 104 MMOL/L (ref 98–107)
CHLORIDE SERPL-SCNC: 105 MMOL/L (ref 98–107)
CHLORIDE SERPL-SCNC: 108 MMOL/L (ref 97–108)
CHOL/HDL RATIO,CHHD: 3 RATIO (ref 0–4)
CHOL/HDL RATIO,CHHD: 4 RATIO (ref 0–4)
CHOLEST SERPL-MCNC: 152 MG/DL (ref 0–200)
CHOLEST SERPL-MCNC: 174 MG/DL (ref 0–200)
CK SERPL-CCNC: 31 U/L (ref 30–135)
CLARITY: CLEAR
CLARITY: CLEAR
CO2 SERPL-SCNC: 22 MMOL/L (ref 21–32)
CO2 SERPL-SCNC: 22 MMOL/L (ref 22–32)
CO2 SERPL-SCNC: 26 MMOL/L (ref 22–32)
COLOR UR: ABNORMAL
COLOR UR: NORMAL
CREAT BLD-MCNC: 1.7 MG/DL (ref 0.6–1.3)
CREAT SERPL-MCNC: 1.1 MG/DL (ref 0.8–1.5)
CREAT SERPL-MCNC: 1.12 MG/DL (ref 0.7–1.3)
CREAT SERPL-MCNC: 1.4 MG/DL (ref 0.8–1.5)
DIAGNOSIS, 93000: NORMAL
DIFFERENTIAL METHOD BLD: ABNORMAL
EOSINOPHIL # BLD: 0.6 K/UL (ref 0–0.4)
EOSINOPHIL NFR BLD: 4 % (ref 0–7)
ERYTHROCYTE [DISTWIDTH] IN BLOOD BY AUTOMATED COUNT: 13.3 % (ref 11.5–14.5)
EST. AVERAGE GLUCOSE BLD GHB EST-MCNC: 143 MG/DL
GLOBULIN SER CALC-MCNC: 3.1 G/DL (ref 2–4)
GLOBULIN,GLOB: 2.8
GLOBULIN,GLOB: 2.8
GLUCOSE 24H UR-MRATE: ABNORMAL G/(24.H)
GLUCOSE 24H UR-MRATE: NEGATIVE G/(24.H)
GLUCOSE BLD STRIP.AUTO-MCNC: 168 MG/DL (ref 65–100)
GLUCOSE BLD STRIP.AUTO-MCNC: 81 MG/DL (ref 65–100)
GLUCOSE SERPL-MCNC: 176 MG/DL (ref 65–100)
GLUCOSE SERPL-MCNC: 219 MG/DL (ref 75–110)
GLUCOSE SERPL-MCNC: 99 MG/DL (ref 75–110)
HBA1C MFR BLD HPLC: 5.5 % (ref 4–5.7)
HBA1C MFR BLD: 6.6 % (ref 4.8–5.6)
HCT VFR BLD AUTO: 41.4 % (ref 36.6–50.3)
HDLC SERPL-MCNC: 44 MG/DL (ref 35–130)
HDLC SERPL-MCNC: 46 MG/DL (ref 35–130)
HGB BLD-MCNC: 12.9 G/DL (ref 12.1–17)
HGB UR QL STRIP: NEGATIVE
HGB UR QL STRIP: NEGATIVE
IMM GRANULOCYTES # BLD AUTO: 0.1 K/UL (ref 0–0.04)
IMM GRANULOCYTES NFR BLD AUTO: 0 % (ref 0–0.5)
INR PPP: 1 (ref 0.9–1.1)
KETONES UR QL STRIP.AUTO: NEGATIVE
KETONES UR QL STRIP.AUTO: NEGATIVE
LDL/HDL RATIO,LDHD: 2 RATIO
LDL/HDL RATIO,LDHD: 2 RATIO
LDLC SERPL CALC-MCNC: 90 MG/DL (ref 0–130)
LDLC SERPL CALC-MCNC: 91 MG/DL (ref 0–130)
LEUKOCYTE ESTERASE: NEGATIVE
LEUKOCYTE ESTERASE: NEGATIVE
LYMPHOCYTES # BLD: 1.3 K/UL (ref 0.8–3.5)
LYMPHOCYTES NFR BLD: 9 % (ref 12–49)
MCH RBC QN AUTO: 29.9 PG (ref 26–34)
MCHC RBC AUTO-ENTMCNC: 31.2 G/DL (ref 30–36.5)
MCV RBC AUTO: 96.1 FL (ref 80–99)
MICROALBUMIN, URINE: 20 MG/L (ref 0–20)
MICROALBUMIN, URINE: NEGATIVE MG/L (ref 0–20)
MONOCYTES # BLD: 1.1 K/UL (ref 0–1)
MONOCYTES NFR BLD: 7 % (ref 5–13)
NEUTS SEG # BLD: 11.5 K/UL (ref 1.8–8)
NEUTS SEG NFR BLD: 79 % (ref 32–75)
NITRITE UR QL STRIP.AUTO: NEGATIVE
NITRITE UR QL STRIP.AUTO: NEGATIVE
NRBC # BLD: 0 K/UL (ref 0–0.01)
NRBC BLD-RTO: 0 PER 100 WBC
P-R INTERVAL, ECG05: 122 MS
PH UR STRIP: 6 [PH] (ref 5–7)
PH UR STRIP: 6 [PH] (ref 5–7)
PLATELET # BLD AUTO: 163 K/UL (ref 150–400)
PMV BLD AUTO: 12.8 FL (ref 8.9–12.9)
POTASSIUM SERPL-SCNC: 4 MMOL/L (ref 3.5–5.1)
POTASSIUM SERPL-SCNC: 5.3 MMOL/L (ref 3.6–5)
POTASSIUM SERPL-SCNC: 5.5 MMOL/L (ref 3.6–5)
PROT SERPL-MCNC: 6.6 G/DL (ref 6.4–8.2)
PROT SERPL-MCNC: 6.9 G/DL (ref 6.3–8.2)
PROT SERPL-MCNC: 7 G/DL (ref 6.3–8.2)
PROT UR STRIP-MCNC: NEGATIVE MG/DL
PROT UR STRIP-MCNC: NEGATIVE MG/DL
PROTHROMBIN TIME: 10.6 SEC (ref 9–11.1)
Q-T INTERVAL, ECG07: 532 MS
QRS DURATION, ECG06: 192 MS
QTC CALCULATION (BEZET), ECG08: 517 MS
RBC # BLD AUTO: 4.31 M/UL (ref 4.1–5.7)
SERVICE CMNT-IMP: ABNORMAL
SERVICE CMNT-IMP: NORMAL
SODIUM SERPL-SCNC: 138 MMOL/L (ref 136–145)
SODIUM SERPL-SCNC: 138 MMOL/L (ref 137–145)
SODIUM SERPL-SCNC: 140 MMOL/L (ref 137–145)
SP GR UR REFRACTOMETRY: 1.01 (ref 1–1.03)
SP GR UR REFRACTOMETRY: 1.02 (ref 1–1.03)
TRIGL SERPL-MCNC: 196 MG/DL (ref 0–200)
TRIGL SERPL-MCNC: 81 MG/DL (ref 0–200)
UROBILINOGEN UR QL STRIP.AUTO: NEGATIVE
UROBILINOGEN UR QL STRIP.AUTO: NEGATIVE
VENTRICULAR RATE, ECG03: 57 BPM
VLDLC SERPL CALC-MCNC: 16 MG/DL
VLDLC SERPL CALC-MCNC: 39 MG/DL
WBC # BLD AUTO: 14.5 K/UL (ref 4.1–11.1)

## 2019-01-01 PROCEDURE — A9575 INJ GADOTERATE MEGLUMI 0.1ML: HCPCS | Performed by: INTERNAL MEDICINE

## 2019-01-01 PROCEDURE — 82962 GLUCOSE BLOOD TEST: CPT

## 2019-01-01 PROCEDURE — 74011250636 HC RX REV CODE- 250/636: Performed by: NURSE ANESTHETIST, CERTIFIED REGISTERED

## 2019-01-01 PROCEDURE — 77030040361 HC SLV COMPR DVT MDII -B: Performed by: PLASTIC SURGERY

## 2019-01-01 PROCEDURE — 78306 BONE IMAGING WHOLE BODY: CPT

## 2019-01-01 PROCEDURE — 74011250636 HC RX REV CODE- 250/636: Performed by: INTERNAL MEDICINE

## 2019-01-01 PROCEDURE — 74011250637 HC RX REV CODE- 250/637: Performed by: INTERNAL MEDICINE

## 2019-01-01 PROCEDURE — 88331 PATH CONSLTJ SURG 1 BLK 1SPC: CPT

## 2019-01-01 PROCEDURE — 74011000250 HC RX REV CODE- 250: Performed by: EMERGENCY MEDICINE

## 2019-01-01 PROCEDURE — 70450 CT HEAD/BRAIN W/O DYE: CPT

## 2019-01-01 PROCEDURE — 77030002916 HC SUT ETHLN J&J -A: Performed by: PLASTIC SURGERY

## 2019-01-01 PROCEDURE — 72158 MRI LUMBAR SPINE W/O & W/DYE: CPT

## 2019-01-01 PROCEDURE — 82565 ASSAY OF CREATININE: CPT

## 2019-01-01 PROCEDURE — 99284 EMERGENCY DEPT VISIT MOD MDM: CPT

## 2019-01-01 PROCEDURE — 70553 MRI BRAIN STEM W/O & W/DYE: CPT

## 2019-01-01 PROCEDURE — 88305 TISSUE EXAM BY PATHOLOGIST: CPT

## 2019-01-01 PROCEDURE — 76060000032 HC ANESTHESIA 0.5 TO 1 HR: Performed by: PLASTIC SURGERY

## 2019-01-01 PROCEDURE — 77030018729 HC ELECTRD DEFIB PAD CARD -B

## 2019-01-01 PROCEDURE — 74011250637 HC RX REV CODE- 250/637: Performed by: STUDENT IN AN ORGANIZED HEALTH CARE EDUCATION/TRAINING PROGRAM

## 2019-01-01 PROCEDURE — 77030018836 HC SOL IRR NACL ICUM -A: Performed by: PLASTIC SURGERY

## 2019-01-01 PROCEDURE — 74011250636 HC RX REV CODE- 250/636: Performed by: ANESTHESIOLOGY

## 2019-01-01 PROCEDURE — 96375 TX/PRO/DX INJ NEW DRUG ADDON: CPT

## 2019-01-01 PROCEDURE — A9552 F18 FDG: HCPCS

## 2019-01-01 PROCEDURE — 85025 COMPLETE CBC W/AUTO DIFF WBC: CPT

## 2019-01-01 PROCEDURE — 80053 COMPREHEN METABOLIC PANEL: CPT

## 2019-01-01 PROCEDURE — 76010000138 HC OR TIME 0.5 TO 1 HR: Performed by: PLASTIC SURGERY

## 2019-01-01 PROCEDURE — 72072 X-RAY EXAM THORAC SPINE 3VWS: CPT

## 2019-01-01 PROCEDURE — 74011250636 HC RX REV CODE- 250/636: Performed by: RADIOLOGY

## 2019-01-01 PROCEDURE — 74011000250 HC RX REV CODE- 250: Performed by: NURSE ANESTHETIST, CERTIFIED REGISTERED

## 2019-01-01 PROCEDURE — 74011250636 HC RX REV CODE- 250/636: Performed by: EMERGENCY MEDICINE

## 2019-01-01 PROCEDURE — 85610 PROTHROMBIN TIME: CPT

## 2019-01-01 PROCEDURE — 92960 CARDIOVERSION ELECTRIC EXT: CPT

## 2019-01-01 PROCEDURE — 74011250636 HC RX REV CODE- 250/636

## 2019-01-01 PROCEDURE — 77030040922 HC BLNKT HYPOTHRM STRY -A

## 2019-01-01 PROCEDURE — 96374 THER/PROPH/DIAG INJ IV PUSH: CPT

## 2019-01-01 PROCEDURE — 77030013629 HC ELECTRD NDL STRY -B: Performed by: PLASTIC SURGERY

## 2019-01-01 PROCEDURE — 74011000250 HC RX REV CODE- 250: Performed by: INTERNAL MEDICINE

## 2019-01-01 PROCEDURE — 73030 X-RAY EXAM OF SHOULDER: CPT

## 2019-01-01 PROCEDURE — 76210000006 HC OR PH I REC 0.5 TO 1 HR: Performed by: PLASTIC SURGERY

## 2019-01-01 PROCEDURE — 99285 EMERGENCY DEPT VISIT HI MDM: CPT

## 2019-01-01 PROCEDURE — 74011636320 HC RX REV CODE- 636/320: Performed by: INTERNAL MEDICINE

## 2019-01-01 PROCEDURE — 76210000021 HC REC RM PH II 0.5 TO 1 HR: Performed by: PLASTIC SURGERY

## 2019-01-01 PROCEDURE — 74011000255 HC RX REV CODE- 255: Performed by: INTERNAL MEDICINE

## 2019-01-01 PROCEDURE — 77030011640 HC PAD GRND REM COVD -A: Performed by: PLASTIC SURGERY

## 2019-01-01 PROCEDURE — A9575 INJ GADOTERATE MEGLUMI 0.1ML: HCPCS

## 2019-01-01 PROCEDURE — 36415 COLL VENOUS BLD VENIPUNCTURE: CPT

## 2019-01-01 PROCEDURE — 74177 CT ABD & PELVIS W/CONTRAST: CPT

## 2019-01-01 PROCEDURE — 74011000250 HC RX REV CODE- 250: Performed by: ANESTHESIOLOGY

## 2019-01-01 PROCEDURE — 93005 ELECTROCARDIOGRAM TRACING: CPT

## 2019-01-01 PROCEDURE — 65270000015 HC RM PRIVATE ONCOLOGY

## 2019-01-01 PROCEDURE — 77030031139 HC SUT VCRL2 J&J -A: Performed by: PLASTIC SURGERY

## 2019-01-01 RX ORDER — EPHEDRINE SULFATE/0.9% NACL/PF 50 MG/5 ML
SYRINGE (ML) INTRAVENOUS AS NEEDED
Status: DISCONTINUED | OUTPATIENT
Start: 2019-01-01 | End: 2019-01-01 | Stop reason: HOSPADM

## 2019-01-01 RX ORDER — MORPHINE SULFATE 2 MG/ML
1 INJECTION, SOLUTION INTRAMUSCULAR; INTRAVENOUS
Status: DISCONTINUED | OUTPATIENT
Start: 2019-01-01 | End: 2019-11-21 | Stop reason: HOSPADM

## 2019-01-01 RX ORDER — PROPOFOL 10 MG/ML
INJECTION, EMULSION INTRAVENOUS
Status: DISCONTINUED | OUTPATIENT
Start: 2019-01-01 | End: 2019-01-01 | Stop reason: HOSPADM

## 2019-01-01 RX ORDER — SODIUM CHLORIDE 0.9 % (FLUSH) 0.9 %
5-40 SYRINGE (ML) INJECTION EVERY 8 HOURS
Status: DISCONTINUED | OUTPATIENT
Start: 2019-01-01 | End: 2019-01-01 | Stop reason: HOSPADM

## 2019-01-01 RX ORDER — GLYCOPYRROLATE 0.2 MG/ML
0.2 INJECTION INTRAMUSCULAR; INTRAVENOUS
Status: COMPLETED | OUTPATIENT
Start: 2019-01-01 | End: 2019-01-01

## 2019-01-01 RX ORDER — LATANOPROST 50 UG/ML
1 SOLUTION/ DROPS OPHTHALMIC
Qty: 1 BOTTLE | Refills: 3 | OUTPATIENT
Start: 2019-01-01

## 2019-01-01 RX ORDER — AMIODARONE HYDROCHLORIDE 200 MG/1
400 TABLET ORAL
COMMUNITY
Start: 2015-11-14 | End: 2019-01-01 | Stop reason: SDUPTHER

## 2019-01-01 RX ORDER — FENTANYL CITRATE 50 UG/ML
25-50 INJECTION, SOLUTION INTRAMUSCULAR; INTRAVENOUS
Status: DISCONTINUED | OUTPATIENT
Start: 2019-01-01 | End: 2019-01-01

## 2019-01-01 RX ORDER — PROPOFOL 10 MG/ML
INJECTION, EMULSION INTRAVENOUS AS NEEDED
Status: DISCONTINUED | OUTPATIENT
Start: 2019-01-01 | End: 2019-01-01 | Stop reason: HOSPADM

## 2019-01-01 RX ORDER — SODIUM CHLORIDE 9 MG/ML
25 INJECTION, SOLUTION INTRAVENOUS CONTINUOUS
Status: DISCONTINUED | OUTPATIENT
Start: 2019-01-01 | End: 2019-01-01

## 2019-01-01 RX ORDER — FUROSEMIDE 20 MG/1
TABLET ORAL
Qty: 30 TAB | Refills: 2 | Status: SHIPPED | OUTPATIENT
Start: 2019-01-01 | End: 2019-01-01 | Stop reason: SDUPTHER

## 2019-01-01 RX ORDER — SODIUM CHLORIDE 0.9 % (FLUSH) 0.9 %
5-40 SYRINGE (ML) INJECTION AS NEEDED
Status: DISCONTINUED | OUTPATIENT
Start: 2019-01-01 | End: 2019-11-21 | Stop reason: HOSPADM

## 2019-01-01 RX ORDER — SODIUM CHLORIDE 0.9 % (FLUSH) 0.9 %
5-40 SYRINGE (ML) INJECTION AS NEEDED
Status: DISCONTINUED | OUTPATIENT
Start: 2019-01-01 | End: 2019-01-01 | Stop reason: HOSPADM

## 2019-01-01 RX ORDER — SODIUM CHLORIDE 0.9 % (FLUSH) 0.9 %
10 SYRINGE (ML) INJECTION
Status: ACTIVE | OUTPATIENT
Start: 2019-01-01 | End: 2019-01-01

## 2019-01-01 RX ORDER — MORPHINE SULFATE 2 MG/ML
2 INJECTION, SOLUTION INTRAMUSCULAR; INTRAVENOUS
Status: COMPLETED | OUTPATIENT
Start: 2019-01-01 | End: 2019-01-01

## 2019-01-01 RX ORDER — GLYCOPYRROLATE 0.2 MG/ML
0.1 INJECTION INTRAMUSCULAR; INTRAVENOUS
Status: DISCONTINUED | OUTPATIENT
Start: 2019-01-01 | End: 2019-11-21 | Stop reason: HOSPADM

## 2019-01-01 RX ORDER — MIDAZOLAM HYDROCHLORIDE 1 MG/ML
.5-2 INJECTION, SOLUTION INTRAMUSCULAR; INTRAVENOUS
Status: DISCONTINUED | OUTPATIENT
Start: 2019-01-01 | End: 2019-01-01

## 2019-01-01 RX ORDER — SODIUM CHLORIDE, SODIUM LACTATE, POTASSIUM CHLORIDE, CALCIUM CHLORIDE 600; 310; 30; 20 MG/100ML; MG/100ML; MG/100ML; MG/100ML
25 INJECTION, SOLUTION INTRAVENOUS CONTINUOUS
Status: DISCONTINUED | OUTPATIENT
Start: 2019-01-01 | End: 2019-01-01 | Stop reason: HOSPADM

## 2019-01-01 RX ORDER — ONDANSETRON 2 MG/ML
4 INJECTION INTRAMUSCULAR; INTRAVENOUS AS NEEDED
Status: DISCONTINUED | OUTPATIENT
Start: 2019-01-01 | End: 2019-01-01 | Stop reason: HOSPADM

## 2019-01-01 RX ORDER — FENTANYL CITRATE 50 UG/ML
INJECTION, SOLUTION INTRAMUSCULAR; INTRAVENOUS AS NEEDED
Status: DISCONTINUED | OUTPATIENT
Start: 2019-01-01 | End: 2019-01-01 | Stop reason: HOSPADM

## 2019-01-01 RX ORDER — MORPHINE SULFATE 10 MG/ML
2 INJECTION, SOLUTION INTRAMUSCULAR; INTRAVENOUS
Status: DISCONTINUED | OUTPATIENT
Start: 2019-01-01 | End: 2019-01-01 | Stop reason: HOSPADM

## 2019-01-01 RX ORDER — LIDOCAINE HYDROCHLORIDE 10 MG/ML
0.1 INJECTION, SOLUTION EPIDURAL; INFILTRATION; INTRACAUDAL; PERINEURAL AS NEEDED
Status: DISCONTINUED | OUTPATIENT
Start: 2019-01-01 | End: 2019-01-01 | Stop reason: HOSPADM

## 2019-01-01 RX ORDER — FENTANYL CITRATE 50 UG/ML
25 INJECTION, SOLUTION INTRAMUSCULAR; INTRAVENOUS
Status: DISCONTINUED | OUTPATIENT
Start: 2019-01-01 | End: 2019-01-01 | Stop reason: HOSPADM

## 2019-01-01 RX ORDER — ONDANSETRON 2 MG/ML
4 INJECTION INTRAMUSCULAR; INTRAVENOUS
Status: DISCONTINUED | OUTPATIENT
Start: 2019-01-01 | End: 2019-11-21 | Stop reason: HOSPADM

## 2019-01-01 RX ORDER — LORAZEPAM 2 MG/ML
0.5 INJECTION INTRAMUSCULAR
Status: DISCONTINUED | OUTPATIENT
Start: 2019-01-01 | End: 2019-11-21 | Stop reason: HOSPADM

## 2019-01-01 RX ORDER — PHENYLEPHRINE HCL IN 0.9% NACL 0.4MG/10ML
SYRINGE (ML) INTRAVENOUS AS NEEDED
Status: DISCONTINUED | OUTPATIENT
Start: 2019-01-01 | End: 2019-01-01 | Stop reason: HOSPADM

## 2019-01-01 RX ORDER — NATEGLINIDE 60 MG/1
60 TABLET ORAL
Qty: 90 TAB | Refills: 3 | Status: SHIPPED | OUTPATIENT
Start: 2019-01-01 | End: 2019-01-01 | Stop reason: SDUPTHER

## 2019-01-01 RX ORDER — FUROSEMIDE 20 MG/1
20 TABLET ORAL DAILY
Qty: 30 TAB | Refills: 3 | Status: SHIPPED | OUTPATIENT
Start: 2019-01-01 | End: 2019-01-01 | Stop reason: SDUPTHER

## 2019-01-01 RX ORDER — AMIODARONE HYDROCHLORIDE 200 MG/1
TABLET ORAL
Qty: 180 TAB | Refills: 3 | Status: SHIPPED | OUTPATIENT
Start: 2019-01-01

## 2019-01-01 RX ORDER — DIPHENHYDRAMINE HYDROCHLORIDE 50 MG/ML
12.5 INJECTION, SOLUTION INTRAMUSCULAR; INTRAVENOUS
Status: DISCONTINUED | OUTPATIENT
Start: 2019-01-01 | End: 2019-01-01 | Stop reason: HOSPADM

## 2019-01-01 RX ORDER — HEPARIN 100 UNIT/ML
500 SYRINGE INTRAVENOUS ONCE
Status: COMPLETED | OUTPATIENT
Start: 2019-01-01 | End: 2019-01-01

## 2019-01-01 RX ORDER — GADOTERATE MEGLUMINE 376.9 MG/ML
16 INJECTION INTRAVENOUS
Status: DISCONTINUED | OUTPATIENT
Start: 2019-01-01 | End: 2019-01-01 | Stop reason: HOSPADM

## 2019-01-01 RX ORDER — SODIUM CHLORIDE 0.9 % (FLUSH) 0.9 %
10 SYRINGE (ML) INJECTION
Status: COMPLETED | OUTPATIENT
Start: 2019-01-01 | End: 2019-01-01

## 2019-01-01 RX ORDER — SODIUM CHLORIDE 0.9 % (FLUSH) 0.9 %
5-40 SYRINGE (ML) INJECTION EVERY 8 HOURS
Status: DISCONTINUED | OUTPATIENT
Start: 2019-01-01 | End: 2019-11-21 | Stop reason: HOSPADM

## 2019-01-01 RX ORDER — HYOSCYAMINE SULFATE 0.12 MG/1
0.25 TABLET SUBLINGUAL
Status: DISCONTINUED | OUTPATIENT
Start: 2019-01-01 | End: 2019-11-21 | Stop reason: HOSPADM

## 2019-01-01 RX ORDER — SODIUM CHLORIDE, SODIUM LACTATE, POTASSIUM CHLORIDE, CALCIUM CHLORIDE 600; 310; 30; 20 MG/100ML; MG/100ML; MG/100ML; MG/100ML
25 INJECTION, SOLUTION INTRAVENOUS CONTINUOUS
Status: CANCELLED | OUTPATIENT
Start: 2019-01-01

## 2019-01-01 RX ORDER — NATEGLINIDE 60 MG/1
60 TABLET ORAL
Qty: 270 TAB | Refills: 1 | Status: SHIPPED | OUTPATIENT
Start: 2019-01-01

## 2019-01-01 RX ORDER — LATANOPROST 50 UG/ML
1 SOLUTION/ DROPS OPHTHALMIC
Qty: 1 BOTTLE | Refills: 3 | Status: SHIPPED | OUTPATIENT
Start: 2019-01-01 | End: 2019-01-01

## 2019-01-01 RX ORDER — BARIUM SULFATE 20 MG/ML
900 SUSPENSION ORAL
Status: COMPLETED | OUTPATIENT
Start: 2019-01-01 | End: 2019-01-01

## 2019-01-01 RX ORDER — GADOTERATE MEGLUMINE 376.9 MG/ML
16 INJECTION INTRAVENOUS
Status: COMPLETED | OUTPATIENT
Start: 2019-01-01 | End: 2019-01-01

## 2019-01-01 RX ORDER — ACETAMINOPHEN 325 MG/1
975 TABLET ORAL ONCE
Status: COMPLETED | OUTPATIENT
Start: 2019-01-01 | End: 2019-01-01

## 2019-01-01 RX ORDER — AMIODARONE HYDROCHLORIDE 200 MG/1
TABLET ORAL
Qty: 60 TAB | Refills: 1 | Status: SHIPPED | OUTPATIENT
Start: 2019-01-01 | End: 2019-01-01 | Stop reason: SDUPTHER

## 2019-01-01 RX ORDER — HYDROCODONE BITARTRATE AND ACETAMINOPHEN 5; 325 MG/1; MG/1
1 TABLET ORAL
Qty: 5 TAB | Refills: 0 | Status: SHIPPED | OUTPATIENT
Start: 2019-01-01 | End: 2019-01-01

## 2019-01-01 RX ORDER — SCOLOPAMINE TRANSDERMAL SYSTEM 1 MG/1
1 PATCH, EXTENDED RELEASE TRANSDERMAL
Status: DISCONTINUED | OUTPATIENT
Start: 2019-01-01 | End: 2019-11-21 | Stop reason: HOSPADM

## 2019-01-01 RX ADMIN — Medication 40 MCG: at 08:59

## 2019-01-01 RX ADMIN — SODIUM CHLORIDE, POTASSIUM CHLORIDE, SODIUM LACTATE AND CALCIUM CHLORIDE 25 ML/HR: 600; 310; 30; 20 INJECTION, SOLUTION INTRAVENOUS at 08:00

## 2019-01-01 RX ADMIN — MORPHINE SULFATE 1 MG: 2 INJECTION, SOLUTION INTRAMUSCULAR; INTRAVENOUS at 16:27

## 2019-01-01 RX ADMIN — Medication 40 MCG: at 08:56

## 2019-01-01 RX ADMIN — GLYCOPYRROLATE 0.1 MG: 0.2 INJECTION, SOLUTION INTRAMUSCULAR; INTRAVENOUS at 14:55

## 2019-01-01 RX ADMIN — Medication 10 ML: at 14:56

## 2019-01-01 RX ADMIN — MORPHINE SULFATE 1 MG: 2 INJECTION, SOLUTION INTRAMUSCULAR; INTRAVENOUS at 21:28

## 2019-01-01 RX ADMIN — GLYCOPYRROLATE 0.2 MG: 0.2 INJECTION, SOLUTION INTRAMUSCULAR; INTRAVENOUS at 09:28

## 2019-01-01 RX ADMIN — Medication 5 MG: at 09:20

## 2019-01-01 RX ADMIN — PROPOFOL 20 MG: 10 INJECTION, EMULSION INTRAVENOUS at 08:47

## 2019-01-01 RX ADMIN — Medication 10 ML: at 21:28

## 2019-01-01 RX ADMIN — MIDAZOLAM HYDROCHLORIDE 2 MG: 1 INJECTION, SOLUTION INTRAMUSCULAR; INTRAVENOUS at 10:26

## 2019-01-01 RX ADMIN — FENTANYL CITRATE 25 MCG: 50 INJECTION, SOLUTION INTRAMUSCULAR; INTRAVENOUS at 10:23

## 2019-01-01 RX ADMIN — SODIUM CHLORIDE, POTASSIUM CHLORIDE, SODIUM LACTATE AND CALCIUM CHLORIDE: 600; 310; 30; 20 INJECTION, SOLUTION INTRAVENOUS at 08:27

## 2019-01-01 RX ADMIN — IOPAMIDOL 100 ML: 755 INJECTION, SOLUTION INTRAVENOUS at 10:22

## 2019-01-01 RX ADMIN — BARIUM SULFATE 450 ML: 21 SUSPENSION ORAL at 10:24

## 2019-01-01 RX ADMIN — Medication 10 ML: at 10:24

## 2019-01-01 RX ADMIN — MORPHINE SULFATE 1 MG: 2 INJECTION, SOLUTION INTRAMUSCULAR; INTRAVENOUS at 12:30

## 2019-01-01 RX ADMIN — ACETAMINOPHEN 975 MG: 325 TABLET ORAL at 15:14

## 2019-01-01 RX ADMIN — PROPOFOL 10 MG: 10 INJECTION, EMULSION INTRAVENOUS at 08:36

## 2019-01-01 RX ADMIN — PROPOFOL 20 MCG/KG/MIN: 10 INJECTION, EMULSION INTRAVENOUS at 08:35

## 2019-01-01 RX ADMIN — Medication 10 MG: at 09:02

## 2019-01-01 RX ADMIN — Medication 10 MG: at 09:09

## 2019-01-01 RX ADMIN — LORAZEPAM 0.5 MG: 2 INJECTION INTRAMUSCULAR; INTRAVENOUS at 18:53

## 2019-01-01 RX ADMIN — PROPOFOL 20 MG: 10 INJECTION, EMULSION INTRAVENOUS at 08:35

## 2019-01-01 RX ADMIN — Medication 10 ML: at 07:48

## 2019-01-01 RX ADMIN — Medication 10 MG: at 09:01

## 2019-01-01 RX ADMIN — GLYCOPYRROLATE 0.1 MG: 0.2 INJECTION, SOLUTION INTRAMUSCULAR; INTRAVENOUS at 21:29

## 2019-01-01 RX ADMIN — PROPOFOL 20 MG: 10 INJECTION, EMULSION INTRAVENOUS at 08:41

## 2019-01-01 RX ADMIN — GADOTERATE MEGLUMINE 20 ML: 376.9 INJECTION INTRAVENOUS at 12:16

## 2019-01-01 RX ADMIN — MIDAZOLAM HYDROCHLORIDE 1 MG: 1 INJECTION, SOLUTION INTRAMUSCULAR; INTRAVENOUS at 10:22

## 2019-01-01 RX ADMIN — FENTANYL CITRATE 25 MCG: 50 INJECTION, SOLUTION INTRAMUSCULAR; INTRAVENOUS at 08:41

## 2019-01-01 RX ADMIN — MORPHINE SULFATE 2 MG: 2 INJECTION, SOLUTION INTRAMUSCULAR; INTRAVENOUS at 09:21

## 2019-01-01 RX ADMIN — PROPOFOL 20 MG: 10 INJECTION, EMULSION INTRAVENOUS at 08:56

## 2019-01-01 RX ADMIN — Medication 500 UNITS: at 13:00

## 2019-01-01 RX ADMIN — FENTANYL CITRATE 25 MCG: 50 INJECTION, SOLUTION INTRAMUSCULAR; INTRAVENOUS at 08:34

## 2019-01-03 NOTE — PROGRESS NOTES
Patient has graduated from the Transitions of Care Coordination  program on 1/3/19. Patient's symptoms are stable at this time. Patient/family has the ability to self-manage. Care management goals have been completed at this time. No further nurse navigator follow up scheduled. Goals Addressed This Visit's Progress  COMPLETED: monitor for and prevent exacerbation of CHF     
  11/19/18- NN spoke with patient and his wife re: importance of daily weights to assess for fluid retention. Patient does have scales and states he will start to weigh daily at the same time each morning and report a 2-3 lb weight gain if that occurs within 24 hr period. In addition I discussed foods high in sodium to avoid and wife will be cognizant of sodium content of foods. Patient saw PCP on ROBBIN visit 11/15/18 within 48 hr of discharge. NN will f/u with patient again in one week to see how he is progressing. / vs 
  
  
 
 
Pt has nurse navigator's contact information for any further questions, concerns, or needs. Patients upcoming visits:   
Future Appointments Date Time Provider Larissa Hogue 1/10/2019 10:30 AM Talbot Landau, MD [address] [place] [address],[facility], [id number]  
1/14/2019 10:00 AM [facility] CT 1 [facility] REG  
3/6/2019  9:10 AM Talbot Landau, MD [address] [place] [address],[facility], [id number]

## 2019-01-10 NOTE — PROGRESS NOTES
This note will not be viewable in [address] Carmen Adan is a 80 y.o. male and presents with Irregular Heart Beat (1m f/u)  . Subjective:    Mr. Ebony Renteria presents today for follow-up of atrial fibrillation and the new diagnosis of congestive heart failure or left ventricular systolic dysfunction. His ejection fraction was 25% on a recent echocardiogram at [facility].  He remains on amiodarone 400 mg daily. 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 He recently had a PET scan and has a follow-up CT scan scheduled per his oncologist.  He also has a history of adrenal insufficiency on room on cortisone daily. He remains anticoagulated for his underlying A. fib. He had a bout of pneumonia before the end of the year and appears to be doing better in this regard. He still has some minimal dyspnea on exertion but this is episodic and may be related to his A. fib. He denies any change in weight or pedal edema. Review of Systems  Constitutional:   Eyes:   negative for visual disturbance and irritation  ENT:   negative for tinnitus,sore throat,nasal congestion,ear pains. hoarseness  Respiratory:  negative for cough, hemoptysis, dyspnea,wheezing  CV:   negative for chest pain, palpitations, lower extremity edema  GI:   negative for nausea, vomiting, diarrhea, abdominal pain,melena  Endo:               negative for polyuria,polydipsia,polyphagia,heat intolerance  Genitourinary: negative for frequency, dysuria and hematuria  Integumentary: negative for rash and pruritus  Hematologic:  negative for easy bruising and gum/nose bleeding  Musculoskel: negative for myalgias, arthralgias, back pain, muscle weakness, joint pain  Neurological:  negative for headaches, dizziness, vertigo, memory problems and gait   Behavl/Psych: negative for feelings of anxiety, depression, mood changes    Past Medical History:   Diagnosis Date    [facility]14 BRAIN RECENT DX , followed by dr. hodges, radiologist    Atrial flutter [facility])     s/p cardioversion    Cancer [facility])     LEFT FOOT ,MELANOMA    Cancer ([address] [place] ([address]    Chronic pain     BACK AND RIGHT HIP    Diabetes mellitus, stable ([address] 2017    Steroid induced    Hyperglycemia 2017    Steroid induced: follow up A1C, BMP, discontinue Starlix in wnl    LBP (low back pain)     Left hip pain 2017    Xray shows no obvious Fx or Metastatic lesions, this pain could be arthritic in nature or radicular, will give a corticosteroid injection, if no benefit may need Ortho eval    Malignant neoplasm of prostate ([address] 2017    OA (osteoarthritis)     Prostate cancer ([address]     Pure hyperglyceridemia 2017    Renal cell cancer (right)     Small bowel obstruction ([address] 2017     Past Surgical History:   Procedure Laterality Date    CARDIAC SURG PROCEDURE UNLIST      MITRAL VALVE REPAIR    HX APPENDECTOMY  1960    HX CATARACT REMOVAL Bilateral 2011    HX LUMBAR LAMINECTOMY      HX ORTHOPAEDIC      CARPAL TUNNEL-SERAFIN    HX OTHER SURGICAL  OCT 2014    MELANOMA REMOVED FROM HEEL    HX PROSTATECTOMY      TURP    HX UROLOGICAL Right     NEPHRECTOMY    PA COLSC FLX W/REMOVAL LESION BY HOT BX FORCEPS  2012         PA SIGMOIDOSCOPY FLX DX W/COLLJ SPEC BR/WA IF PFRMD  2012          Social History     Socioeconomic History    Marital status:      Spouse name: Not on file    Number of children: Not on file    Years of education: Not on file    Highest education level: Not on file   Tobacco Use    Smoking status: Former Smoker     Packs/day: 0.25     Years: 2.00     Pack years: 0.50     Last attempt to quit: 1953     Years since quittin.7    Smokeless tobacco: Never Used   Substance and Sexual Activity    Alcohol use: No    Drug use: No     Family History   Problem Relation Age of Onset    Diabetes Father     Dementia Mother     Cancer Sister         BRAIN    Heart Disease Brother     Arthritis-osteo Sister     Anesth Problems Neg Hx      Current Outpatient Medications   Medication Sig Dispense Refill    amiodarone (CORDARONE) 200 mg tablet Take two (2) tablets by mouth daily 180 Tab 3    furosemide (LASIX) 20 mg tablet Take 1 Tab by mouth daily. 30 Tab 3    traMADol (ULTRAM) 50 mg tablet Take 25-50 mg by mouth every eight (8) hours as needed for Pain.  calcium carbonate (CALCIUM 600) 600 mg calcium (1,500 mg) tablet Take 600 mg by mouth daily.  nateglinide (STARLIX) 60 mg tablet TAKE 1 TABLET BY MOUTH 3 TIMES A DAY WITH MEALS 90 Tab 3    hydrocortisone (CORTEF) 10 mg tablet TAKE 1 TABLET BY MOUTH TWICE A DAY. now daily  10    PREVIDENT 5000 BOOSTER PLUS 1.1 % pste BRUSH AFTER BREAKFAST AND SUPPER CAN ALSO USE IN FLUORIDE TRAY  12    diclofenac (VOLTAREN) 1 % gel Apply 2 g to affected area four (4) times daily as needed. 100 g 0    bimatoprost (LUMIGAN) 0.01 % ophthalmic drops Administer 1 Drop to both eyes nightly.  cholecalciferol (VITAMIN D3) 1,000 unit tablet Take 1,000 Units by mouth daily.  apixaban (ELIQUIS) 5 mg tablet Take 2.5 mg by mouth two (2) times a day.  acetaminophen (TYLENOL EXTRA STRENGTH) 500 mg tablet Take 500 mg by mouth every six (6) hours as needed for Pain.        Allergies   Allergen Reactions    Amoxicillin Unknown (comments)     Other reaction(s): GI distress    Nabumetone Itching and Hives       Objective:  Visit Vitals  /86 (BP 1 Location: Right arm, BP Patient Position: Sitting)   Pulse 87   Temp 98 °F (36.7 °C) (Oral)   Resp 16   Ht 5' 8\" (1.727 m)   Wt 181 lb 4.8 oz (82.2 kg)   SpO2 96%   BMI 27.57 kg/m²     Physical Exam:   General appearance - alert, well appearing, and in no distress  Mental status - alert, oriented to person, place, and time  EYE-JASMINE, EOMI, fundi normal, corneas normal, no foreign bodies  ENT-ENT exam normal, no neck nodes or sinus tenderness  Nose - normal and patent, no erythema, discharge or polyps  Mouth - mucous membranes moist, pharynx normal without lesions  Neck - supple, no significant adenopathy   Chest - clear to auscultation, no wheezes, rales or rhonchi, symmetric air entry   Heart - normal rate, regular rhythm, normal S1, S2, no murmurs, rubs, clicks or gallops   Abdomen - soft, nontender, nondistended, no masses or organomegaly  Lymph- no adenopathy palpable  Ext-peripheral pulses normal, no pedal edema, no clubbing or cyanosis  Skin-Warm and dry. no hyperpigmentation, vitiligo, or suspicious lesions  Neuro -alert, oriented, normal speech, no focal findings or movement disorder noted  Musculoskeletal- FROM, no bony abnormalities, no point tenderness    No results found for this visit on 01/10/19. All results for lab orders may not have been returned by the time this encountered was closed. Assessment/Plan:       ICD-10-CM ICD-9-CM    1. Paroxysmal atrial fibrillation (HCC) I48.0 427.31    2. Acute congestive heart failure, unspecified heart failure type (HCC) I50.9 428.0    3. Adrenal insufficiency (HCC) E27.40 255.41    4. Type 2 diabetes with nephropathy (HCC) E11.21 250.40      583.81    5. Malignant melanoma, unspecified site ([facility] C43.9 172.9      Plan:    Patient remains on a diuretic and amiodarone. He has follow-up with cardiology scheduled within a month. We did discuss watching for change in weight PND orthopnea or pedal edema. If he experiences any of these problems he will call us or his cardiologist for further instructions. He has a follow-up scheduled for early March. No orders of the defined types were placed in this encounter. Follow-up Disposition: Not on File       I have reviewed with the patient details of the assessment and plan and all questions were answered. Relevent patient education was performed.  Verbal and/or written instructions (see AVS) provided. The most recent lab findings were reviewed with the patient. Plan was discussed with patient who verbal expressed understanding. An After Visit Summary was printed and given to the patient.       Huber Oshea MD

## 2019-01-10 NOTE — PROGRESS NOTES
Chief Complaint   Patient presents with    Irregular Heart Beat     1m f/u     1. Have you been to the ER, urgent care clinic since your last visit? Hospitalized since your last visit? No    2. Have you seen or consulted any other health care providers outside of the [address] [place] [address] since your last visit? Include any pap smears or colon screening. Oncologist Dr. Deja Cervantes. Pt had treatment this morning for CA.   Dr. Cristofer Geronimo Cardiologist.

## 2019-02-13 NOTE — PROGRESS NOTES
Discharge instructions reviewed with patient and wife, Yousif Cooley. Allowed adequate time to ask questions, all questions answered. Printed copy of AVS given to patient. All belongings gathered, IV and tele discontinued. Transported via wheelchair to main entrance and into care of family.

## 2019-02-13 NOTE — PROCEDURES
[address] Name:  Ericka Castellon 
MR#:  [id number] :  1931 ACCOUNT #:  [de-identified] DATE OF SERVICE:  2019 PROCEDURE:  Electrical cardioversion. PREOPERATIVE DIAGNOSIS:  Atrial fibrillation. POSTOPERATIVE DIAGNOSIS:  Atrial fibrillation. ESTIMATED BLOOD LOSS:  None. SPECIMENS:  None. SEDATION:  Versed and fentanyl were administered with continuous supervision starting 10:22 a.m. and terminating at 10:30 a.m. DESCRIPTION OF PROCEDURE:  Following sedation, he was cardioverted with a single 
delivery of 200 joules of synchronized energy converting to normal sinus rhythm. CONCLUSION:  Successful cardioversion of atrial fibrillation to normal sinus rhythm. PRIMARY CARE:  MD Santana Pike MD 
 
 
[id number] 
D:  2019 10:32 
T:  2019 11:04 
JOB #:  [id number] CC:  Twin Gregory MD

## 2019-02-13 NOTE — PROGRESS NOTES
Patient arrived to Non-Invasive Cardiology Lab for Out Patient Cardioversion Procedure. Staff introduced to patient. Patient identifiers verified with Name and Date of Birth. Procedure verified with patient. Consent forms reviewed and signed by patient or authorized representative and verified. Allergies verified. Patient informed of procedure and plan of care. Questions answered with review. Patient on cardiac monitor, non-invasive blood pressure, SPO2 monitor. On room air. Patient is A&Ox3. Patient reports no complaints. Patient on stretcher, in low position, with side rails up. Patient instructed to call for assistance as needed. Family in waiting room.

## 2019-03-06 NOTE — PROGRESS NOTES
Hao Beatty presents today at the clinic for    Chief Complaint   Patient presents with    Diabetes     follow up    CHF     follow up        Wt Readings from Last 3 Encounters:   03/06/19 173 lb (78.5 kg)   02/13/19 174 lb (78.9 kg)   01/10/19 181 lb 4.8 oz (82.2 kg)     Temp Readings from Last 3 Encounters:   03/06/19 97.9 °F (36.6 °C) (Oral)   01/10/19 98 °F (36.7 °C) (Oral)   12/13/18 97.7 °F (36.5 °C) (Oral)     BP Readings from Last 3 Encounters:   03/06/19 124/62   02/13/19 100/58   01/10/19 117/86     Pulse Readings from Last 3 Encounters:   03/06/19 60   02/13/19 60   01/10/19 87       Health Maintenance Due   Topic    LIPID PANEL Q1     FOOT EXAM Q1     DTaP/Tdap/Td series (1 - Tdap)    Shingrix Vaccine Age 50> (1 of 2)    Pneumococcal 65+ High/Highest Risk (1 of 2 - PCV13)    MEDICARE YEARLY EXAM     HEMOGLOBIN A1C Q6M     MICROALBUMIN Q1          Learning Assessment:  :     Learning Assessment 1/19/2018 7/13/2017   PRIMARY LEARNER Patient Patient   BARRIERS PRIMARY LEARNER - NONE   CO-LEARNER CAREGIVER - No   PRIMARY LANGUAGE ENGLISH ENGLISH   LEARNER PREFERENCE PRIMARY READING READING   ANSWERED BY self patent   RELATIONSHIP SELF SELF       Depression Screening:  :     3 most recent PHQ Screens 3/6/2019   Little interest or pleasure in doing things Not at all   Feeling down, depressed, irritable, or hopeless Not at all   Total Score PHQ 2 0       Fall Risk Assessment:  :     Fall Risk Assessment, last 12 mths 3/6/2019   Able to walk? Yes   Fall in past 12 months? No   Fall with injury? -   Number of falls in past 12 months -   Fall Risk Score -       Abuse Screening:  :     Abuse Screening Questionnaire 3/6/2019 1/10/2019 7/13/2017   Do you ever feel afraid of your partner? N N N   Are you in a relationship with someone who physically or mentally threatens you? N N N   Is it safe for you to go home?  Nenita Delarosa       Coordination of Care Questionnaire:  :     1. Have you been to the ER, urgent care clinic since your last visit? Hospitalized since your last visit? no    2. Have you seen or consulted any other health care providers outside of the [address] [place] [address] since your last visit? Include any pap smears or colon screening. Patient states he saw Dr Shirin Rea last month, he saw Dr Damon Cruz yesterday and he will see Dr Lara Or tomorrow.

## 2019-03-06 NOTE — PROGRESS NOTES
This note will not be viewable in [address] Caleb Yadav is a 80 y.o. male and presents with Diabetes (follow up) and CHF (follow up)  . Subjective:    Mr. Maria Dolores Méndez presents today for follow-up of diabetes, CHF, history of atrial flutter status post cardioversion, adrenal insufficiency, history of metastatic melanoma and remote history of prostate cancer. He has been doing well in general and without significant complaint. He does have some mild difficulty with ambulation with discomfort in his right lower extremity and is walking with a rolling walker. He denies shortness of breath, chest pain, palpitations, PND, orthopnea, or pedal edema. Review of Systems  Constitutional:   Eyes:   negative for visual disturbance and irritation  ENT:   negative for tinnitus,sore throat,nasal congestion,ear pains. hoarseness  Respiratory:  negative for cough, hemoptysis, dyspnea,wheezing  CV:   negative for chest pain, palpitations, lower extremity edema  GI:   negative for nausea, vomiting, diarrhea, abdominal pain,melena  Endo:               negative for polyuria,polydipsia,polyphagia,heat intolerance  Genitourinary: negative for frequency, dysuria and hematuria  Integumentary: negative for rash and pruritus  Hematologic:  negative for easy bruising and gum/nose bleeding  Musculoskel: negative for myalgias, arthralgias, back pain, muscle weakness, joint pain  Neurological:  negative for headaches, dizziness, vertigo, memory problems and gait   Behavl/Psych: negative for feelings of anxiety, depression, mood changes    Past Medical History:   Diagnosis Date    Aneurysm ([address] 10/1/14    BRAIN RECENT DX , followed by dr. hodges, radiologist    Atrial flutter [facility])     s/p cardioversion    Cancer ([address] 2013    LEFT FOOT ,MELANOMA    Cancer ([address] 1980    RIGHT KIDNEY    Cancer ([address] 1988    PROSTATE  TREATED WITH RADIATION    Chronic pain     BACK AND RIGHT HIP    Diabetes mellitus, stable ([address] 7/13/2017 Steroid induced    Hyperglycemia 2017    Steroid induced: follow up A1C, BMP, discontinue Starlix in wnl    LBP (low back pain)     Left hip pain 2017    Xray shows no obvious Fx or Metastatic lesions, this pain could be arthritic in nature or radicular, will give a corticosteroid injection, if no benefit may need Ortho eval    Malignant neoplasm of prostate ([address] 2017    OA (osteoarthritis)     Prostate cancer ([address]     Pure hyperglyceridemia 2017    Renal cell cancer (right)     Small bowel obstruction ([address] 2017     Past Surgical History:   Procedure Laterality Date    CARDIAC SURG PROCEDURE UNLIST      MITRAL VALVE REPAIR    HX APPENDECTOMY      HX CATARACT REMOVAL Bilateral     HX LUMBAR LAMINECTOMY      HX ORTHOPAEDIC      CARPAL TUNNEL-SERAFIN    HX OTHER SURGICAL  OCT 2014    MELANOMA REMOVED FROM HEEL    HX PROSTATECTOMY      TURP    HX UROLOGICAL Right     NEPHRECTOMY    CO COLSC FLX W/REMOVAL LESION BY HOT BX FORCEPS  2012         CO SIGMOIDOSCOPY FLX DX W/COLLJ SPEC BR/WA IF PFRMD  2012          Social History     Socioeconomic History    Marital status:      Spouse name: Not on file    Number of children: Not on file    Years of education: Not on file    Highest education level: Not on file   Tobacco Use    Smoking status: Former Smoker     Packs/day: 0.25     Years: 2.00     Pack years: 0.50     Last attempt to quit: 1953     Years since quittin.9    Smokeless tobacco: Never Used   Substance and Sexual Activity    Alcohol use: No    Drug use: No     Family History   Problem Relation Age of Onset    Diabetes Father     Dementia Mother     Cancer Sister         BRAIN    Heart Disease Brother     Arthritis-osteo Sister     Anesth Problems Neg Hx      Current Outpatient Medications   Medication Sig Dispense Refill    amiodarone (CORDARONE) 200 mg tablet Take two (2) tablets by mouth daily (Patient taking differently: Take 400 mg by mouth two (2) times a day. Take two (2) tablets by mouth daily) 180 Tab 3    furosemide (LASIX) 20 mg tablet Take 1 Tab by mouth daily. 30 Tab 3    traMADol (ULTRAM) 50 mg tablet Take 25-50 mg by mouth every eight (8) hours as needed for Pain.  calcium carbonate (CALCIUM 600) 600 mg calcium (1,500 mg) tablet Take 600 mg by mouth daily.  nateglinide (STARLIX) 60 mg tablet TAKE 1 TABLET BY MOUTH 3 TIMES A DAY WITH MEALS 90 Tab 3    hydrocortisone (CORTEF) 10 mg tablet TAKE 1 TABLET BY MOUTH TWICE A DAY. now daily  10    PREVIDENT 5000 BOOSTER PLUS 1.1 % pste BRUSH AFTER BREAKFAST AND SUPPER CAN ALSO USE IN FLUORIDE TRAY  12    diclofenac (VOLTAREN) 1 % gel Apply 2 g to affected area four (4) times daily as needed. 100 g 0    bimatoprost (LUMIGAN) 0.01 % ophthalmic drops Administer 1 Drop to both eyes nightly.  cholecalciferol (VITAMIN D3) 1,000 unit tablet Take 1,000 Units by mouth daily.  apixaban (ELIQUIS) 5 mg tablet Take 2.5 mg by mouth two (2) times a day.  acetaminophen (TYLENOL EXTRA STRENGTH) 500 mg tablet Take 500 mg by mouth every six (6) hours as needed for Pain.        Allergies   Allergen Reactions    Amoxicillin Unknown (comments)     Other reaction(s): GI distress    Nabumetone Itching and Hives       Objective:  Visit Vitals  /62 (BP 1 Location: Right arm, BP Patient Position: Sitting)   Pulse 60   Temp 97.9 °F (36.6 °C) (Oral)   Resp 16   Ht 5' 8\" (1.727 m)   Wt 173 lb (78.5 kg)   SpO2 95%   BMI 26.30 kg/m²     Physical Exam:   General appearance - alert, well appearing, and in no distress  Mental status - alert, oriented to person, place, and time  EYE-JASMINE, EOMI, fundi normal, corneas normal, no foreign bodies  ENT-ENT exam normal, no neck nodes or sinus tenderness  Nose - normal and patent, no erythema, discharge or polyps  Mouth - mucous membranes moist, pharynx normal without lesions  Neck - supple, no significant adenopathy Chest - clear to auscultation, no wheezes, rales or rhonchi, symmetric air entry   Heart - normal rate, regular rhythm, normal S1, S2, no murmurs, rubs, clicks or gallops   Abdomen - soft, nontender, nondistended, no masses or organomegaly  Lymph- no adenopathy palpable  Ext-peripheral pulses normal, no pedal edema, no clubbing or cyanosis  Skin-Warm and dry. no hyperpigmentation, vitiligo, or suspicious lesions  Neuro -alert, oriented, normal speech, no focal findings or movement disorder noted  Musculoskeletal- FROM, no bony abnormalities, no point tenderness  . Glen Broussard Diabetic foot exam:     Left Foot:   Visual Exam: normal    Pulse DP: 1+ (weak)   Filament test: reduced sensation    Vibratory sensation: diminished      Right Foot:   Visual Exam: normal    Pulse DP: 1+ (weak)   Filament test: reduced sensation    Vibratory sensation: diminished    Results for orders placed or performed in visit on 88/53/61   METABOLIC PANEL, COMPREHENSIVE   Result Value Ref Range    Glucose 219 (H) 75 - 110 mg/dL    BUN 22.0 (H) 9.0 - 20.0 mg/dL    Creatinine 1.1 0.8 - 1.5 mg/dL    Sodium 138 137 - 145 mmol/L    Potassium 5.3 (H) 3.6 - 5.0 mmol/L    Chloride 104 98 - 107 mmol/L    CO2 22.0 22.0 - 32.0 mmol/L    Calcium 9.6 8.4 - 10.2 mg/dl    Protein, total 7.0 6.3 - 8.2 g/dL    Albumin 4.2 3.9 - 5.4 g/dL    AST (SGOT) 21.0 14.0 - 36.0 U/L    ALT (SGPT) 25 9 - 52 U/L    Alk.  phosphatase 102 38 - 126 U/L    Bilirubin, total 0.4 0.2 - 1.3 mg/dL    BUN/Creatinine ratio 20 Ratio    GFR est AA >60 mL/min/1.73m2    GFR est non-AA >60 mL/min/1.73m2    Globulin 2.80     A-G Ratio 1.5 Ratio    Anion gap 12 mmol/L   LIPID PANEL   Result Value Ref Range    Cholesterol, total 152 0 - 200 mg/dL    Triglyceride 81 0 - 200 mg/dL    HDL Cholesterol 46 35 - 130 mg/dL    VLDL 16 mg/dL    LDL, calculated 90 0 - 130 mg/dL    CHOL/HDL Ratio 3 0 - 4 Ratio    LDL/HDL Ratio 2 Ratio   CK   Result Value Ref Range    CK 31.00 30.00 - 135.00 U/L   URINALYSIS W/O MICRO   Result Value Ref Range    Color Pale Yellow Pale Yellow - Yellow    CLARITY clear Clear    Glucose urine, 24 hr 3+ (A) Negative    Ketone Negative Negative    Bilirubin Negative Negative    Specific gravity 1.020 1.000 - 1.030    pH (UA) 6 5 - 7    Blood Negative Negative    Protein Negative Negative    Urobilinogen Negative Negative    Nitrites Negative Negative    Leukocyte Esterase Negative Negative   URINE, MICROALBUMIN, SEMIQUANTITATIVE   Result Value Ref Range    Microalbumin, Urine Negative 0 - 20 mg/L     All results for lab orders may not have been returned by the time this encountered was closed. Assessment/Plan:       ICD-10-CM ICD-9-CM    1. Paroxysmal atrial fibrillation (HCC) I48.0 427.31    2. Acute congestive heart failure, unspecified heart failure type ([facility]) I50.9 428.0    3. Adrenal insufficiency ([facility]) E27.40 255.41    4. Diabetes mellitus, stable ([facility]) E11.9 250.00    5. Adrenal insufficiency (Jose's disease) ([facility] E27.1 255.41    6. Type 2 diabetes with nephropathy ([facility]) E11.21 250.40 HEMOGLOBIN A1C WITH EAG     532.73 METABOLIC PANEL, COMPREHENSIVE      LIPID PANEL      URINALYSIS W/O MICRO      URINE, MICROALBUMIN, SEMIQUANTITATIVE   7. On statin therapy Z79.899 V58.69 CK       Orders Placed This Encounter    HEMOGLOBIN A1C WITH EAG    METABOLIC PANEL, COMPREHENSIVE ([facility])    LIPID PANEL ([facility])    [facility]    URINALYSIS W/O MICRO ([facility])    URINE, MICROALBUMIN, SEMIQUANTITATIVE ([facility])    DISCONTD: amiodarone (CORDARONE) 200 mg tablet     Sig: Take 400 mg by mouth. Plan:    Continue current medical regimen as outlined above. The patient is status post cardioversion for A. fib and has follow-up with cardiology. Further recommendations based on labs as ordered. Follow-up Disposition:  Return in about 6 months (around 9/6/2019) for [address] [place] [address].        I have reviewed with the patient details of the assessment and plan and all questions were answered. Relevent patient education was performed. Verbal and/or written instructions (see AVS) provided. The most recent lab findings were reviewed with the patient. Plan was discussed with patient who verbal expressed understanding. An After Visit Summary was printed and given to the patient.       Kenrick Springer MD

## 2019-03-13 NOTE — TELEPHONE ENCOUNTER
Requested Prescriptions     Pending Prescriptions Disp Refills    nateglinide (STARLIX) 60 mg tablet 90 Tab 3     Sig: Take 1 Tab by mouth Before breakfast, lunch, and dinner.        Last Refill: 08/07/18  Next Appointment:10/02/19

## 2019-03-14 NOTE — TELEPHONE ENCOUNTER
Requested Prescriptions     Pending Prescriptions Disp Refills    furosemide (LASIX) 20 mg tablet 30 Tab 3     Sig: Take 1 Tab by mouth daily.        Last Refill: 11/13/18  Next Appointment:10/02/19

## 2019-03-15 NOTE — TELEPHONE ENCOUNTER
Requested Prescriptions     Pending Prescriptions Disp Refills    amiodarone (CORDARONE) 200 mg tablet 180 Tab 3     Sig: Take two (2) tablets by mouth daily       Last Refill: 12/14/18  Next Appointment:10/02/19

## 2019-06-19 NOTE — TELEPHONE ENCOUNTER
PCP: Michael Aguero MD    Last appt: 3/6/2019  Future Appointments   Date Time Provider Larissa Hogue   8/2/2019  8:00 AM [facility]   8/2/2019  9:00 AM [facility]   10/2/2019 10:15 AM Michael Aguero MD [facility]       Requested Prescriptions     Pending Prescriptions Disp Refills    latanoprost (XALATAN) 0.005 % ophthalmic solution 1 Bottle 3     Sig: Administer 1 Drop to both eyes nightly.        Prior labs and Blood pressures:  BP Readings from Last 3 Encounters:   03/06/19 124/62   02/13/19 100/58   01/10/19 117/86     Lab Results   Component Value Date/Time    Sodium 138 03/06/2019 09:34 AM    Potassium 5.3 (H) 03/06/2019 09:34 AM    Chloride 104 03/06/2019 09:34 AM    CO2 22.0 03/06/2019 09:34 AM    Anion gap 12 03/06/2019 09:34 AM    Glucose 219 (H) 03/06/2019 09:34 AM    BUN 22.0 (H) 03/06/2019 09:34 AM    Creatinine 1.1 03/06/2019 09:34 AM    BUN/Creatinine ratio 20 03/06/2019 09:34 AM    GFR est AA >60 03/06/2019 09:34 AM    GFR est non-AA >60 03/06/2019 09:34 AM    Calcium 9.6 03/06/2019 09:34 AM     Lab Results   Component Value Date/Time    Hemoglobin A1c 6.6 (H) 03/06/2019 09:33 AM    Hemoglobin A1c (POC) 6.0 (A) 01/10/2018 10:35 AM    Hemoglobin A1c, External 6.9 06/07/2017     Lab Results   Component Value Date/Time    Cholesterol, total 152 03/06/2019 09:34 AM    HDL Cholesterol 46 03/06/2019 09:34 AM    LDL, calculated 90 03/06/2019 09:34 AM    VLDL 16 03/06/2019 09:34 AM    Triglyceride 81 03/06/2019 09:34 AM    CHOL/HDL Ratio 3 03/06/2019 09:34 AM     No results found for: ANIBAL TrujilloRIMARICEL    Lab Results   Component Value Date/Time    TSH 0.47 11/10/2018 04:47 AM

## 2019-07-29 NOTE — ED TRIAGE NOTES
Patient fell down brick steps. Patient took Tylenol about an hour ago. Code FX called. MARIO Churchill present in Triage.

## 2019-07-29 NOTE — DISCHARGE INSTRUCTIONS
Patient Education        Preventing Falls: Care Instructions  Your Care Instructions    Getting around your home safely can be a challenge if you have injuries or health problems that make it easy for you to fall. Loose rugs and furniture in walkways are among the dangers for many older people who have problems walking or who have poor eyesight. People who have conditions such as arthritis, osteoporosis, or dementia also have to be careful not to fall. You can make your home safer with a few simple measures. Follow-up care is a key part of your treatment and safety. Be sure to make and go to all appointments, and call your doctor if you are having problems. It's also a good idea to know your test results and keep a list of the medicines you take. How can you care for yourself at home? Taking care of yourself  · You may get dizzy if you do not drink enough water. To prevent dehydration, drink plenty of fluids, enough so that your urine is light yellow or clear like water. Choose water and other caffeine-free clear liquids. If you have kidney, heart, or liver disease and have to limit fluids, talk with your doctor before you increase the amount of fluids you drink. · Exercise regularly to improve your strength, muscle tone, and balance. Walk if you can. Swimming may be a good choice if you cannot walk easily. · Have your vision and hearing checked each year or any time you notice a change. If you have trouble seeing and hearing, you might not be able to avoid objects and could lose your balance. · Know the side effects of the medicines you take. Ask your doctor or pharmacist whether the medicines you take can affect your balance. Sleeping pills or sedatives can affect your balance. · Limit the amount of alcohol you drink. Alcohol can impair your balance and other senses. · Ask your doctor whether calluses or corns on your feet need to be removed.  If you wear loose-fitting shoes because of calluses or corns, you can lose your balance and fall. · Talk to your doctor if you have numbness in your feet. Preventing falls at home  · Remove raised doorway thresholds, throw rugs, and clutter. Repair loose carpet or raised areas in the floor. · Move furniture and electrical cords to keep them out of walking paths. · Use nonskid floor wax, and wipe up spills right away, especially on ceramic tile floors. · If you use a walker or cane, put rubber tips on it. If you use crutches, clean the bottoms of them regularly with an abrasive pad, such as steel wool. · Keep your house well lit, especially Abhijeet Handler, and outside walkways. Use night-lights in areas such as hallways and bathrooms. Add extra light switches or use remote switches (such as switches that go on or off when you clap your hands) to make it easier to turn lights on if you have to get up during the night. · Install sturdy handrails on stairways. · Move items in your cabinets so that the things you use a lot are on the lower shelves (about waist level). · Keep a cordless phone and a flashlight with new batteries by your bed. If possible, put a phone in each of the main rooms of your house, or carry a cell phone in case you fall and cannot reach a phone. Or, you can wear a device around your neck or wrist. You push a button that sends a signal for help. · Wear low-heeled shoes that fit well and give your feet good support. Use footwear with nonskid soles. Check the heels and soles of your shoes for wear. Repair or replace worn heels or soles. · Do not wear socks without shoes on wood floors. · Walk on the grass when the sidewalks are slippery. If you live in an area that gets snow and ice in the winter, sprinkle salt on slippery steps and sidewalks. Preventing falls in the bath  · Install grab bars and nonskid mats inside and outside your shower or tub and near the toilet and sinks. · Use shower chairs and bath benches.   · Use a hand-held shower head that will allow you to sit while showering. · Get into a tub or shower by putting the weaker leg in first. Get out of a tub or shower with your strong side first.  · Repair loose toilet seats and consider installing a raised toilet seat to make getting on and off the toilet easier. · Keep your bathroom door unlocked while you are in the shower. Where can you learn more? Go to http://brock-pauline.info/. Enter [phone] [id number] [phone] in the search box to learn more about \"Preventing Falls: Care Instructions. \"  Current as of: November 7, 2018  Content Version: 12.1  © 9321-3508 [employer], [employer]. Care instructions adapted under license by [employer] (which disclaims liability or warranty for this information). If you have questions about a medical condition or this instruction, always ask your healthcare professional. [address] any warranty or liability for your use of this information.

## 2019-07-29 NOTE — ED PROVIDER NOTES
EMERGENCY DEPARTMENT HISTORY AND PHYSICAL EXAM      Date: 7/29/2019  Patient Name: Faby Pride    History of Presenting Illness     Chief Complaint   Patient presents with   [facility] Fall     pt was carrying a bucket and fell 3/4 of nine steps total and hit his head on the right side of his head. pt denies LOC    Shoulder Injury     pt is complaining of pain in his right clavicle after falling, pt has multiple abrasians across his entire body       History Provided By: Patient    HPI: Faby Pride, 80 y.o. male  presents to the ED with cc of fall. Patient states that earlier today while he was walking down a flight of stairs he tripped and fell approximately 4-5 steps. Patient \"tumbled\" down the stairs and ended up landing on his right side and hitting his head. Patient denies LOC, headache, blurry vision, numbness, tingling, weakness, nausea, or vomiting. Patients family states that he was ambulatory following the fall and did not have any gait abnormalities. Patient currently taking apixiban for previos DVT. Patient admits to right shoulder pain and upper back pain otherwise denies c-spine pain, chest pain, dyspnea, and abdominal pain. Patient up to date on tetanus. Patient has a known aneurysm which is being monitored and has malignant melanoma that is stable currently. There are no other complaints, changes, or physical findings at this time. PCP: Shan Sherman MD    No current facility-administered medications on file prior to encounter. Current Outpatient Medications on File Prior to Encounter   Medication Sig Dispense Refill    nateglinide (STARLIX) 60 mg tablet TAKE 1 TAB BY MOUTH BEFORE BREAKFAST, LUNCH, AND DINNER. 270 Tab 1    latanoprost (XALATAN) 0.005 % ophthalmic solution Administer 1 Drop to both eyes nightly.  1 Bottle 3    furosemide (LASIX) 20 mg tablet TAKE 1 TABLET BY MOUTH EVERY DAY 30 Tab 2    amiodarone (CORDARONE) 200 mg tablet Take two (2) tablets by mouth daily 180 Tab 3    traMADol (ULTRAM) 50 mg tablet Take 25-50 mg by mouth every eight (8) hours as needed for Pain.  calcium carbonate (CALCIUM 600) 600 mg calcium (1,500 mg) tablet Take 600 mg by mouth daily.  hydrocortisone (CORTEF) 10 mg tablet TAKE 1 TABLET BY MOUTH TWICE A DAY. now daily  10    PREVIDENT 5000 BOOSTER PLUS 1.1 % pste BRUSH AFTER BREAKFAST AND SUPPER CAN ALSO USE IN FLUORIDE TRAY  12    diclofenac (VOLTAREN) 1 % gel Apply 2 g to affected area four (4) times daily as needed. 100 g 0    bimatoprost (LUMIGAN) 0.01 % ophthalmic drops Administer 1 Drop to both eyes nightly.  cholecalciferol (VITAMIN D3) 1,000 unit tablet Take 1,000 Units by mouth daily.  apixaban (ELIQUIS) 5 mg tablet Take 2.5 mg by mouth two (2) times a day.  acetaminophen (TYLENOL EXTRA STRENGTH) 500 mg tablet Take 500 mg by mouth every six (6) hours as needed for Pain.          Past History     Past Medical History:  Past Medical History:   Diagnosis Date    Aneurysm ([address] 10/1/14    BRAIN RECENT DX , followed by dr. hodges, radiologist    Atrial flutter [facility])     s/p cardioversion    Cancer [facility]) 2013    LEFT FOOT ,MELANOMA    Cancer ([address] 1980    RIGHT KIDNEY    Cancer ([address]    Chronic pain     BACK AND RIGHT HIP    Diabetes mellitus, stable ([address] 7/13/2017    Steroid induced    Hyperglycemia 7/13/2017    Steroid induced: follow up A1C, BMP, discontinue Starlix in wnl    LBP (low back pain)     Left hip pain 7/13/2017    Xray shows no obvious Fx or Metastatic lesions, this pain could be arthritic in nature or radicular, will give a corticosteroid injection, if no benefit may need Ortho eval    Malignant neoplasm of prostate ([address] 7/13/2017    OA (osteoarthritis)     Prostate cancer ([address]     Pure hyperglyceridemia 7/13/2017    Renal cell cancer (right)     Small bowel obstruction ([address] 7/13/2017       Past Surgical History:  Past Surgical History: Procedure Laterality Date    CARDIAC SURG PROCEDURE UNLIST      MITRAL VALVE REPAIR    HX APPENDECTOMY  1960    HX CATARACT REMOVAL Bilateral 2011    HX LUMBAR LAMINECTOMY      HX ORTHOPAEDIC      CARPAL TUNNEL-SERAFIN    HX OTHER SURGICAL  OCT 2014    MELANOMA REMOVED FROM HEEL    HX PROSTATECTOMY      TURP    HX UROLOGICAL Right     NEPHRECTOMY    OR COLSC FLX W/REMOVAL LESION BY HOT BX FORCEPS  2012         OR SIGMOIDOSCOPY FLX DX W/COLLJ SPEC BR/WA IF PFRMD  2012            Family History:  Family History   Problem Relation Age of Onset    Diabetes Father     Dementia Mother     Cancer Sister         BRAIN    Heart Disease Brother     Arthritis-osteo Sister     Anesth Problems Neg Hx        Social History:  Social History     Tobacco Use    Smoking status: Former Smoker     Packs/day: 0.25     Years: 2.00     Pack years: 0.50     Last attempt to quit: 1953     Years since quittin.3    Smokeless tobacco: Never Used   Substance Use Topics    Alcohol use: No    Drug use: No       Allergies: Allergies   Allergen Reactions    Amoxicillin Unknown (comments)     Other reaction(s): GI distress    Nabumetone Itching and Hives         Review of Systems   Review of Systems   Constitutional: Negative for activity change, fatigue and fever. HENT: Negative for sore throat. Eyes: Negative for visual disturbance. Respiratory: Negative for shortness of breath. Cardiovascular: Negative for chest pain. Gastrointestinal: Negative for abdominal pain, diarrhea, nausea and vomiting. Endocrine: Negative for polyuria. Genitourinary: Negative for dysuria and hematuria. Musculoskeletal: Positive for back pain. Right shoulder pain   Skin: Positive for wound. Negative for rash. Neurological: Negative for dizziness, facial asymmetry, weakness, light-headedness, numbness and headaches.         No Ataxia       Physical Exam   Physical Exam   Constitutional: He is oriented to person, place, and time. He appears well-developed and well-nourished. No distress. HENT:   Head: Normocephalic. Head is with abrasion. Eyes: Pupils are equal, round, and reactive to light. EOM are normal. No scleral icterus. Neck: Normal range of motion. Neck supple. No tracheal deviation present. No midline tenderness to palpation to the c-spine   Cardiovascular: Normal rate and regular rhythm. Exam reveals no gallop and no friction rub. No murmur heard. Pulmonary/Chest: Effort normal and breath sounds normal. No respiratory distress. Abdominal: Soft. He exhibits no distension. There is no tenderness. There is no guarding. Musculoskeletal: He exhibits tenderness (Tenderness to palpation over the Right AC joint). He exhibits no deformity. No crepitus or deformity appreciated. No stepoff noted of the right shoulder. Neurological: He is alert and oriented to person, place, and time. No cranial nerve deficit. He exhibits normal muscle tone. GCS eye subscore is 4. GCS verbal subscore is 5. GCS motor subscore is 6. Skin: Skin is warm and dry. Abrasion and ecchymosis noted. Psychiatric: He has a normal mood and affect. Diagnostic Study Results     Labs -   No results found for this or any previous visit (from the past 24 hour(s)). Radiologic Studies -   CT HEAD WO CONT   Final Result   IMPRESSION:  No acute findings. No change since previous studies. 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 XR SHOULDER RT AP/LAT MIN 2 V    (Results Pending)   XR SPINE THORAC 3 V    (Results Pending)     CT Results  (Last 48 hours)               07/29/19 1324  CT HEAD WO CONT Final result    Impression:  IMPRESSION:  No acute findings. No change since previous studies. 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            Narrative:  EXAMINATION:  CT HEAD WO CONT       CLINICAL INFORMATION:  Headache, post trauma   COMPARISON:  6/25/2018    TECHNIQUE: Routine axial head CT was performed. IV contrast was not   administered. Sagittal and coronal reconstructions were generated. CT dose reduction was achieved through use of a standardized protocol tailored   for this examination and automatic exposure control for dose modulation. FINDINGS:   No acute infarct, hemorrhage or mass. VENTRICULAR SYSTEM:  Normal for age. BASAL CISTERNS:  Patent. BRAIN PARENCHYMA:  Chronic infarct in the left anterior corona radiata. No other   focal lesions. Unchanged 1.8 cm left middle cerebral artery aneurysm, previously   demonstrated on CT angiography. MIDLINE SHIFT:  None. CALVARIUM/ SKULL BASE: Intact. PARANASAL SINUSES AND MASTOID AIR CELLS: Clear. VISUALIZED ORBITS: No significant abnormalities. SELLA: No enlargement. CXR Results  (Last 48 hours)    None            Medical Decision Making   I am the first provider for this patient. I reviewed the vital signs, available nursing notes, past medical history, past surgical history, family history and social history. Vital Signs-Reviewed the patient's vital signs. Patient Vitals for the past 24 hrs:   Temp Pulse Resp BP SpO2   07/29/19 1258 97.6 °F (36.4 °C) (!) 55 18 134/67 95 %       Pulse Oximetry Analysis - 95% on RA    Cardiac Monitor:   Rate: 55 bpm  Rhythm: Normal Sinus Rhythm      Records Reviewed: Old Medical Records    Provider Notes (Medical Decision Making): This is a 80year old male who comes to the ED following a fall of 4-5 steps. Patient has multiple superficial abrasions located in multiple locations on his body without any areas in need for laceration repair. Patients right should does not appear to be dislocated however will obtain an x-ray to eval for fx as well.  Will also obtain a head CT of the head to eval for intracranial bleeding and x-ray of the thoracic spine to eval for displacement or fracture. Patient cleared by nexus and [place] c-spine rules. Will continue to monitor and evaluate patient while in the ED. Patients CT scan shows  No acute findings. No change since previous studies. Large left middle cerebral artery aneurysm. 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 X-ray of the right should and thoracic spine did not show evidence of acute abnormalities. Will discharge patient home with strict return precautions and follow up recommendations. ED Course:   Initial assessment performed. The patients presenting problems have been discussed, and they are in agreement with the care plan formulated and outlined with them. I have encouraged them to ask questions as they arise throughout their visit. ED Course as of Jul 29 1400   Mon Jul 29, 2019   1303 I was asked to evaluate the patient in triage for possible code Fx. Patient currently declines analgesia. X-ray of right shoulder and CT of head ordered. Radha Monson PA-C    [NINFA]      ED Course User Index  [NINFA] Bernarda Barrientos [place]       Orders Placed This Encounter    XR SHOULDER RT AP/LAT MIN 2 V    CT HEAD WO CONT    XR SPINE THORAC 3 V         Critical Care Time:   0    Disposition:  Discharge home    PLAN:  1. Current Discharge Medication List        2. Follow-up Information    None       Return to ED if worse     Diagnosis     Clinical Impression: No diagnosis found. This note will not be viewable in [address]

## 2019-09-23 NOTE — TELEPHONE ENCOUNTER
PCP: Yaquelin Dalton MD    Last appt: 3/6/2019  Future Appointments   Date Time Provider Larissa Hogue   10/2/2019 10:15 AM Yaquelin Dalton MD [address]       Requested Prescriptions     Pending Prescriptions Disp Refills    latanoprost (XALATAN) 0.005 % ophthalmic solution 1 Bottle 3     Sig: Administer 1 Drop to both eyes nightly.        Prior labs and Blood pressures:  BP Readings from Last 3 Encounters:   07/29/19 134/67   03/06/19 124/62   02/13/19 100/58     Lab Results   Component Value Date/Time    Sodium 138 03/06/2019 09:34 AM    Potassium 5.3 (H) 03/06/2019 09:34 AM    Chloride 104 03/06/2019 09:34 AM    CO2 22.0 03/06/2019 09:34 AM    Anion gap 12 03/06/2019 09:34 AM    Glucose 219 (H) 03/06/2019 09:34 AM    BUN 22.0 (H) 03/06/2019 09:34 AM    Creatinine 1.1 03/06/2019 09:34 AM    BUN/Creatinine ratio 20 03/06/2019 09:34 AM    GFR est AA >60 03/06/2019 09:34 AM    GFR est non-AA >60 03/06/2019 09:34 AM    Calcium 9.6 03/06/2019 09:34 AM     Lab Results   Component Value Date/Time    Hemoglobin A1c 6.6 (H) 03/06/2019 09:33 AM    Hemoglobin A1c (POC) 6.0 (A) 01/10/2018 10:35 AM    Hemoglobin A1c, External 6.9 06/07/2017     Lab Results   Component Value Date/Time    Cholesterol, total 152 03/06/2019 09:34 AM    HDL Cholesterol 46 03/06/2019 09:34 AM    LDL, calculated 90 03/06/2019 09:34 AM    VLDL 16 03/06/2019 09:34 AM    Triglyceride 81 03/06/2019 09:34 AM    CHOL/HDL Ratio 3 03/06/2019 09:34 AM     No results found for: [facility] Pulido    Lab Results   Component Value Date/Time    TSH 0.47 11/10/2018 04:47 AM

## 2019-10-02 NOTE — PROGRESS NOTES
After obtaining written consent and per orders of Dr. Anastasiya Espinoza, injection of Prevnar 13 given by Lenora Gibbs LPN. Order and injection/medication verified by second nurse/ma review by Elizabeth Marcos CMA. Patient tolerated procedure well. VIS was given to them. No reactions noted.

## 2019-10-02 NOTE — LETTER
Name:.Singh Harvey DHS:[birth year] MR #:[id number] Provider Magdalena Lamas MD  
*[id number]* BSMG-491 (5/16) Page 1 of 5 Initial [employer] CONTROLLED SUBSTANCE AGREEMENT I may be prescribed medications that are controlled substances as part  of my treatment plan for management of my medical condition(s). The goal of my treatment plan is to maintain and/or improve my health and wellbeing. Because controlled substances have an increased risk of abuse or harm, continual re-evaluation is needed determine if the goals of my treatment plan are being met for my safety and the safety of others. Erika Campos  am entering into this Controlled Substance Agreement with my provider, Valeria Mckeon MD at [address] [place] [address] . I understand that successful treatment requires mutual trust and honesty between me and my provider. I understand that there are state and federal laws and regulations which apply to the medications that my provider may prescribe that must be followed. I understand there are risks and benefits ts of taking the medicines that my provider may prescribe. I understand and agree that following this Agreement is necessary in continuing my provider-patient relationship and success of my treatment plan. As a part of my treatment plan, I agree to the following: COMMUNICATION: 
 
1. I will communicate fully with my provider about my medical condition(s), including the effect on my daily life and how well my medications are helping. I will tell my provider all of the medications that I take for any reason, including medications I receive from another health care provider, and will notify my provider about all issues, problems or concerns, including any side effects, which may be related to my medications.  
 
I understand that this information allows my provider to adjust my treatment plan to help manage my medical condition. I understand that this information will become part of my permanent medical record. 2. I will notify my provider if I have a history of alcohol/drug misuse/addiction or if I have had treatment for alcohol/drug addiction in the past, or if I have a new problem with or concern about alcohol/drug use/addiction, because this increases the likelihood of high risk behaviors and may lead to serious medical conditions. 3. Females Only: I will notify my provider if I am or become pregnant, or if I intend to become pregnant, or if I intend to breastfeed. I understand that communication of these issues with my provider is important, due to possible effects my medication could have on an unborn fetus or breastfeeding child. Name:.Singh Edward URE:[birth year] MR #:[id number] Provider Betina Johnson MD  
*[id number]* BSMG-491 (5/16) Page 2 of 5 Initial [employer] MISUSE OF MEDICATIONS / DRUGS: 
 
1. I agree to take all controlled substances as prescribed, and will not misuse or abuse any controlled substances prescribed by my provider. For my safety, I will not increase the amount of medicine I take without first talking with and getting permission from my provider. 2. If I have a medical emergency, another health care provider may prescribe me medication. If I seek emergency treatment, I will notify my provider within seventy-two (72) hours. 3. I understand that my provider may discuss my use and/or possible misuse/abuse of controlled substances and alcohol, as appropriate, with any health care provider involved in my care, pharmacist or legal authority. ILLEGAL DRUGS: 
 
1. I will not use illegal drugs of any kind, including but not limited to marijuana, heroin, cocaine, or any prescription drug which is not prescribed to me.  
 
DRUG DIVERSION / PRESCRIPTION FRAUD: 
 
 1. I will not share, sell, trade, give away, or otherwise misuse my prescriptions or medications. 2. I will not alter any prescriptions provided to me by my provider. SINGLE PROVIDER: 
 
1. I agree that all controlled substances that I take will be prescribed only by my provider (or his/her covering provider) under this Agreement. This agreement does not prevent me from seeking emergency medical treatment or receiving pain management related to a surgery. PROTECTING MEDICATIONS: 
 
1. I am responsible for keeping my prescriptions and medications in a safe and secure place including safeguarding them from loss or theft. I understand that lost, stolen or damaged/destroyed prescriptions or medications will not be replaced. Name:.Singh Castillo YDM:[birth year] MR #:[id number] Provider Tenzin Carreon MD  
*[id number]* BSMG-491 (5/16) Page 3 of 5 Initial [employer] PRESCRIPTION RENEWALS/REFILLS: 
 
 
1. I authorize my provider and my pharmacy to cooperate fully with any local, state, or federal law enforcement agency in the investigation of any possible misuse, sale, or other diversion of my controlled substance prescriptions or medications. RISKS: 
 
 
1. I understand that if I do not adhere to this Agreement in any way, my provider may change my prescriptions, stop prescribing controlled substances or end our provider-patient relationship. 2. If my provider decides to stop prescribing medication, or decides to end our provider-patient relationship,my provider may require that I taper my medications slowly. If necessary, my provider may also provide a prescription for other medications to treat my withdrawal symptoms. UNDERSTANDING THIS AGREEMENT: 
 
I understand that my provider may adjust or stop my prescriptions for controlled substances based on my medical condition and my treatment plan. I understand that this Agreement does not guarantee that I will be prescribed medications or controlled substances. I understand that controlled substances may be just one part of my treatment plan. My initial on each page and my signature below shows that I have read each page of this Agreement, I have had an opportunity to ask questions, and all of my questions have been answered to my satisfaction by my provider. By signing below, I agree to comply with this Agreement, and I understand that if I do not follow the Agreements listed above, my provider may stop 
 
 
 
_________________________________________  Date/Time 10/2/2019 10:20 AM   
             (Patient Signature)

## 2019-10-02 NOTE — PROGRESS NOTES
This is the Subsequent Medicare Annual Wellness Exam, performed 12 months or more after the Initial AWV or the last Subsequent AWV I have reviewed the patient's medical history in detail and updated the computerized patient record. History Past Medical History:  
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 hodges, radiologist  
 Atrial flutter [facility])   
 s/p cardioversion  Cancer [facility]) 2013 LEFT FOOT ,MELANOMA  Cancer ([address] 1980 RIGHT KIDNEY  
 Cancer [facility]) 1988 PROSTATE  TREATED WITH RADIATION  Chronic pain BACK AND RIGHT HIP  
 Diabetes mellitus, stable ([address] 7/13/2017 Steroid induced  Hyperglycemia 7/13/2017 Steroid induced: follow up A1C, BMP, discontinue Starlix in wnl  LBP (low back pain)  Left hip pain 7/13/2017 Xray shows no obvious Fx or Metastatic lesions, this pain could be arthritic in nature or radicular, will give a corticosteroid injection, if no benefit may need Ortho eval  
 Malignant neoplasm of prostate ([address] 7/13/2017  OA (osteoarthritis)  Prostate cancer ([address]  Pure hyperglyceridemia 7/13/2017  Renal cell cancer (right)  Small bowel obstruction ([address] 7/13/2017 Past Surgical History:  
Procedure Laterality Date [address] MITRAL VALVE REPAIR  
 [address]  HX CATARACT REMOVAL Bilateral 2011  HX LUMBAR LAMINECTOMY  1990  
 HX ORTHOPAEDIC    
 CARPAL TUNNEL-SERAFIN  HX OTHER SURGICAL  OCT 2014 MELANOMA REMOVED FROM HEEL  
 [address] [facility] TURP  
 HX UROLOGICAL Right NEPHRECTOMY  [place] COLSC FLX W/REMOVAL LESION BY HOT BX FORCEPS  9/24/2012  [place] SIGMOIDOSCOPY FLX DX W/COLLJ SPEC BR/WA IF PFRMD  9/21/2012 Current Outpatient Medications Medication Sig Dispense Refill  furosemide (LASIX) 20 mg tablet TAKE 1 TABLET BY MOUTH EVERY DAY 90 Tab 3  
  nateglinide (STARLIX) 60 mg tablet TAKE 1 TAB BY MOUTH BEFORE BREAKFAST, LUNCH, AND DINNER. 270 Tab 1  
 latanoprost (XALATAN) 0.005 % ophthalmic solution Administer 1 Drop to both eyes nightly. 1 Bottle 3  
 amiodarone (CORDARONE) 200 mg tablet Take two (2) tablets by mouth daily 180 Tab 3  
 traMADol (ULTRAM) 50 mg tablet Take 25-50 mg by mouth every eight (8) hours as needed for Pain.  calcium carbonate (CALCIUM 600) 600 mg calcium (1,500 mg) tablet Take 600 mg by mouth daily.  hydrocortisone (CORTEF) 10 mg tablet TAKE 1 TABLET BY MOUTH TWICE A DAY. now daily  10  bimatoprost (LUMIGAN) 0.01 % ophthalmic drops Administer 1 Drop to both eyes nightly.  cholecalciferol (VITAMIN D3) 1,000 unit tablet Take 1,000 Units by mouth daily.  apixaban (ELIQUIS) 5 mg tablet Take 2.5 mg by mouth two (2) times a day.  acetaminophen (TYLENOL EXTRA STRENGTH) 500 mg tablet Take 500 mg by mouth every six (6) hours as needed for Pain.  PREVIDENT 5000 BOOSTER PLUS 1.1 % pste BRUSH AFTER BREAKFAST AND SUPPER CAN ALSO USE IN FLUORIDE TRAY  12  
 diclofenac (VOLTAREN) 1 % gel Apply 2 g to affected area four (4) times daily as needed. 100 g 0 Allergies Allergen Reactions  Amoxicillin Unknown (comments) Other reaction(s): GI distress  Nabumetone Itching and Hives Family History Problem Relation Age of Onset  Diabetes Father  Dementia Mother  Cancer Sister BRAIN  
 Heart Disease Brother  Arthritis-osteo Sister  Anesth Problems Neg Hx Social History Tobacco Use  Smoking status: Former Smoker Packs/day: 0.25 Years: 2.00 Pack years: 0.50 Last attempt to quit: 1953 Years since quittin.5  Smokeless tobacco: Never Used Substance Use Topics  Alcohol use: No  
 
Patient Active Problem List  
Diagnosis Code  A-fib ([facility]) I48.91  
 Back pain M54.9  Melanoma (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  
 
3 most recent PHQ Screens 3/6/2019 Little interest or pleasure in doing things Not at all Feeling down, depressed, irritable, or hopeless Not at all Total Score PHQ 2 0 Alcohol Risk Factor Screening: You do not drink alcohol or very rarely. Functional Ability and Level of Safety:  
Hearing Loss The patient wears hearing aids. Activities of Daily Living The home contains: Rollator walker Patient does total self care Fall Risk Fall Risk Assessment, last 12 mths 3/6/2019 Able to walk? Yes Fall in past 12 months? No  
Fall with injury? -  
Number of falls in past 12 months - Fall Risk Score -  
 
 
Abuse Screen Patient is not abused Cognitive Screening Evaluation of Cognitive Function: 
Has your family/caregiver stated any concerns about your memory: no 
Normal 
 
Patient Care Team  
Patient Care Team: 
Christina Castro MD as PCP - General (Internal Medicine) Assessment/Plan Education and counseling provided: 
Are appropriate based on today's review and evaluation Diagnoses and all orders for this visit: 
 
1. Medicare annual wellness visit, subsequent 2. Adrenal insufficiency ([facility] 3.  Type 2 diabetes with nephropathy ([facility]) 
-     HEMOGLOBIN A1C W/O EAG 
-     METABOLIC PANEL, COMPREHENSIVE 
 -     LIPID PANEL 
-     URINALYSIS W/O MICRO 
-     URINE, MICROALBUMIN, SEMIQUANTITATIVE 4. Paroxysmal atrial fibrillation (HCC) 5. Encounter for immunization 
-     PNEUMOCOCCAL CONJ VACCINE 13 VALENT IM 6. Screening for alcoholism -     [place] ANNUAL ALCOHOL SCREEN 15 MIN 
 
7. Screening for depression 
-     Jenny Horton Health Maintenance Due Topic Date Due  Shingrix Vaccine Age 50> (1 of 2) 12/13/1981  
 DTaP/Tdap/Td series (1 - Tdap) 05/31/2012  Pneumococcal 65+ years (2 of 2 - PCV13) 01/15/2015  MEDICARE YEARLY EXAM  03/14/2018  
 EYE EXAM RETINAL OR DILATED  05/15/2019  Influenza Age 5 to Adult  08/01/2019  
 HEMOGLOBIN A1C Q6M  09/06/2019 This note will not be viewable in [address] Jelly Prabhakar is a 80 y.o. male and presents with Annual Wellness Visit; Diabetes; and Cholesterol Problem Kami Ziegler Subjective: 
Mr. Jesse Ayala presents today for follow-up Medicare annual wellness review as well as follow-up of diabetes, hyperlipidemia. He has continued treatment for melanoma is being followed by oncology regularly. He also has a history of prostate cancer and renal cell cancer remotely. He has had a couple of falls and his most recent fall was about 6 weeks ago. Thankfully he did not have any significant trauma from this fall. He is somewhat unsteady of gait and is using a Rollator walker at this time. He feels this has studied his gait somewhat. He did have physical therapy previously and felt this helped tremendously. He inquired about having this done again. He denies shortness of breath, chest pain, palpitations, PND, orthopnea, or pedal edema. Review of Systems Constitutional: negative for fevers, chills, anorexia and weight loss Eyes:   negative for visual disturbance and irritation ENT:   negative for tinnitus,sore throat,nasal congestion,ear pains. hoarseness Respiratory:  negative for cough, hemoptysis, dyspnea,wheezing CV:   negative for chest pain, palpitations, lower extremity edema GI:   negative for nausea, vomiting, diarrhea, abdominal pain,melena Endo:               negative for polyuria,polydipsia,polyphagia,heat intolerance Genitourinary: negative for frequency, dysuria and hematuria Integumentary: negative for rash and pruritus Hematologic:  negative for easy bruising and gum/nose bleeding Musculoskel: negative for myalgias, arthralgias, back pain, muscle weakness, joint pain Neurological:  negative for headaches, dizziness, vertigo, memory problems and gait Behavl/Psych: negative for feelings of anxiety, depression, mood changes Past Medical History:  
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 hodges, radiologist  
 Atrial flutter [facility])   
 s/p cardioversion  Cancer [facility]) 2013 LEFT FOOT ,MELANOMA  Cancer ([facility] [address] [address] 1980 RIGHT KIDNEY  
 Cancer [facility]) 1988 PROSTATE  TREATED WITH RADIATION  Chronic pain BACK AND RIGHT HIP  
 Diabetes mellitus, stable ([address] 7/13/2017 Steroid induced  Hyperglycemia 7/13/2017 Steroid induced: follow up A1C, BMP, discontinue Starlix in wnl  LBP (low back pain)  Left hip pain 7/13/2017 Xray shows no obvious Fx or Metastatic lesions, this pain could be arthritic in nature or radicular, will give a corticosteroid injection, if no benefit may need Ortho eval  
 Malignant neoplasm of prostate ([address] 7/13/2017  OA (osteoarthritis)  Prostate cancer ([address]  Pure hyperglyceridemia 7/13/2017  Renal cell cancer (right)  Small bowel obstruction ([address] 7/13/2017 Past Surgical History:  
Procedure Laterality Date [address] [facility] [address] MITRAL VALVE REPAIR  
 [address]  HX CATARACT REMOVAL Bilateral 2011  HX LUMBAR LAMINECTOMY  1990  
 HX ORTHOPAEDIC    
 CARPAL TUNNEL-SERAFIN  HX OTHER SURGICAL  OCT 2014 MELANOMA REMOVED FROM HEEL  
 [address] [facility] T[facility]P  
 HX UROLOGICAL Right NEPHRECTOMY  DC COLSC FLX W/REMOVAL LESION BY HOT BX FORCEPS  2012  [place] SIGMOIDOSCOPY FLX DX W/COLLJ SPEC BR/WA IF PFRMD  2012 Social History Socioeconomic History  Marital status:  Spouse name: Not on file  Number of children: Not on file  Years of education: Not on file  Highest education level: Not on file Tobacco Use  Smoking status: Former Smoker Packs/day: 0.25 Years: 2.00 Pack years: 0.50 Last attempt to quit: 1953 Years since quittin.5  Smokeless tobacco: Never Used Substance and Sexual Activity  Alcohol use: No  
 Drug use: No  
 
Family History Problem Relation Age of Onset  Diabetes Father  Dementia Mother  Cancer Sister BRAIN  
 Heart Disease Brother  Arthritis-osteo Sister  Anesth Problems Neg Hx Current Outpatient Medications Medication Sig Dispense Refill  furosemide (LASIX) 20 mg tablet TAKE 1 TABLET BY MOUTH EVERY DAY 90 Tab 3  
 nateglinide (STARLIX) 60 mg tablet TAKE 1 TAB BY MOUTH BEFORE BREAKFAST, LUNCH, AND DINNER. 270 Tab 1  
 latanoprost (XALATAN) 0.005 % ophthalmic solution Administer 1 Drop to both eyes nightly. 1 Bottle 3  
 amiodarone (CORDARONE) 200 mg tablet Take two (2) tablets by mouth daily 180 Tab 3  
 traMADol (ULTRAM) 50 mg tablet Take 25-50 mg by mouth every eight (8) hours as needed for Pain.  calcium carbonate (CALCIUM 600) 600 mg calcium (1,500 mg) tablet Take 600 mg by mouth daily.  hydrocortisone (CORTEF) 10 mg tablet TAKE 1 TABLET BY MOUTH TWICE A DAY. now daily  10  bimatoprost (LUMIGAN) 0.01 % ophthalmic drops Administer 1 Drop to both eyes nightly.  cholecalciferol (VITAMIN D3) 1,000 unit tablet Take 1,000 Units by mouth daily.  apixaban (ELIQUIS) 5 mg tablet Take 2.5 mg by mouth two (2) times a day.     
 acetaminophen (TYLENOL EXTRA STRENGTH) 500 mg tablet Take 500 mg by mouth every six (6) hours as needed for Pain.  PREVIDENT 5000 BOOSTER PLUS 1.1 % pste BRUSH AFTER BREAKFAST AND SUPPER CAN ALSO USE IN FLUORIDE TRAY  12  
 diclofenac (VOLTAREN) 1 % gel Apply 2 g to affected area four (4) times daily as needed. 100 g 0 Allergies Allergen Reactions  Amoxicillin Unknown (comments) Other reaction(s): GI distress  Nabumetone Itching and Hives Objective: 
Visit Vitals /70 (BP 1 Location: Left arm, BP Patient Position: Sitting) Pulse 64 Temp 98.1 °F (36.7 °C) (Oral) Resp 16 Ht 5' 6\" (1.676 m) Wt 174 lb 12.8 oz (79.3 kg) SpO2 96% BMI 28.21 kg/m² Physical Exam:  
General appearance - alert, well appearing, and in no distress Mental status - alert, oriented to person, place, and time EYE-JASMINE, EOMI, fundi normal, corneas normal, no foreign bodies ENT-ENT exam normal, no neck nodes or sinus tenderness Nose - normal and patent, no erythema, discharge or polyps Mouth - mucous membranes moist, pharynx normal without lesions Neck - supple, no significant adenopathy Chest - clear to auscultation, no wheezes, rales or rhonchi, symmetric air entry Heart - normal rate, regular rhythm, normal S1, S2, no murmurs, rubs, clicks or gallops Abdomen - soft, nontender, nondistended, no masses or organomegaly Lymph- no adenopathy palpable Ext-peripheral pulses normal, no pedal edema, no clubbing or cyanosis, decreased light touch sensation both lower extremities, vibration sense is normal 
Skin-Warm and dry. no hyperpigmentation, vitiligo, or suspicious lesions Neuro -alert, oriented, normal speech, no focal findings or movement disorder noted Musculoskeletal- FROM, no bony abnormalities, no point tenderness  -deferred No results found for this visit on 10/02/19. Assessment/Plan: 
 
Orders Placed This Encounter  Depression Screen Annual  
 Pneumococcal Conjugate vaccine - 13 valent (50 years and older)  HEMOGLOBIN A1C W/O EAG ([facility])  METABOLIC PANEL, COMPREHENSIVE ([facility])  LIPID PANEL ([facility])  URINALYSIS W/O MICRO ([facility])  URINE, MICROALBUMIN, SEMIQUANTITATIVE ([facility])  Annual  Alcohol Screen 15 min () Problem List Items Addressed This Visit A-fib (HCC) (Chronic) Adrenal insufficiency ([facility] Type 2 diabetes with nephropathy (HCC) Relevant Orders HEMOGLOBIN A1C W/O EAG METABOLIC PANEL, COMPREHENSIVE  
 LIPID PANEL  
 URINALYSIS W/O MICRO URINE, MICROALBUMIN, SEMIQUANTITATIVE Other Visit Diagnoses Medicare annual wellness visit, subsequent    -  Primary Encounter for immunization Relevant Orders PNEUMOCOCCAL CONJ VACCINE 13 VALENT IM (Completed) Screening for alcoholism Relevant Orders [place] ANNUAL ALCOHOL SCREEN 15 MIN Screening for depression Relevant Orders Jenny Horton Plan: The patient has sustained several falls and is unsteady of gait. He is currently using a Rollator walker. He should be evaluated further by physical therapy and evaluated for gait instability. Further recommendations based on labs as ordered. Patient Instructions Vaccine Information Statement Pneumococcal Conjugate Vaccine (PCV13): What You Need to Know Many Vaccine Information Statements are available in French and other languages. See www.immunize.org/vis. Hojas de información Sobre Vacunas están disponibles en español y en muchos otros idiomas. Visite www.immunize.org/vis. 1. Why get vaccinated? Vaccination can protect both children and adults from pneumococcal disease. Pneumococcal disease is caused by bacteria that can spread from person to person through close contact. It can cause ear infections, and it can also lead to more serious infections of the: 
 Lungs (pneumonia),  Blood (bacteremia), and 
  Covering of the brain and spinal cord (meningitis). Pneumococcal pneumonia is most common among adults. Pneumococcal meningitis can cause deafness and brain damage, and it kills about 1 child in 10 who get it. Anyone can get pneumococcal disease, but children under 3years of age and adults 72 years and older, people with certain medical conditions, and cigarette smokers are at the highest risk. Before there was a vaccine, the [facility] saw: 
 more than 700 cases of meningitis, 
 about 13,000 blood infections, 
 about 5 million ear infections, and 
 about 200 deaths 
in children under 5 each year from pneumococcal disease. Since vaccine became available, severe pneumococcal disease in these children has fallen by 88%. About 18,000 older adults die of pneumococcal disease each year in the [place]. Treatment of pneumococcal infections with penicillin and other drugs is not as effective as it used to be, because some strains of the disease have become resistant to these drugs. This makes prevention of the disease, through vaccination, even more important. 2. PCV13 vaccine Pneumococcal conjugate vaccine (called PCV13) protects against 13 types of pneumococcal bacteria. PCV13 is routinely given to children at 2, 4, 6, and 1515 months of age. It is also recommended for children and adults 3to 59years of age with certain health conditions, and for all adults 72years of age and older. Your doctor can give you details. 3. Some people should not get this vaccine Anyone who has ever had a life-threatening allergic reaction to a dose of this vaccine, to an earlier pneumococcal vaccine called PCV7, or to any vaccine containing diphtheria toxoid (for example, DTaP), should not get PCV13. Anyone with a severe allergy to any component of PCV13 should not get the vaccine. Tell your doctor if the person being vaccinated has any severe allergies. If the person scheduled for vaccination is not feeling well, your healthcare provider might decide to reschedule the shot on another day. 4. Risks of a vaccine reaction With any medicine, including vaccines, there is a chance of reactions. These are usually mild and go away on their own, but serious reactions are also possible. Problems reported following PCV13 varied by age and dose in the series. The most common problems reported among children were:  About half became drowsy after the shot, had a temporary loss of appetite, or had redness or tenderness where the shot was given.  About 1 out of 3 had swelling where the shot was given.  About 1 out of 3 had a mild fever, and about 1 in 20 had a fever over 102.2°F. 
 Up to about 8 out of 10 became fussy or irritable. Adults have reported pain, redness, and swelling where the shot was given; also mild fever, fatigue, headache, chills, or muscle pain. [place] children who get PCV13 along with inactivated flu vaccine at the same time may be at increased risk for seizures caused by fever. Ask your doctor for more information. Problems that could happen after any vaccine:  People sometimes faint after a medical procedure, including vaccination. Sitting or lying down for about 15 minutes can help prevent fainting, and injuries caused by a fall. Tell your doctor if you feel dizzy, or have vision changes or ringing in the ears.  Some older children and adults get severe pain in the shoulder and have difficulty moving the arm where a shot was given. This happens very rarely.  Any medication can cause a severe allergic reaction. Such reactions from a vaccine are very rare, estimated at about 1 in a million doses, and would happen within a few minutes to a few hours after the vaccination. As with any medicine, there is a very small chance of a vaccine causing a serious injury or death. The safety of vaccines is always being monitored. For more information, visit: www.cdc.gov/vaccinesafety/  
 
5. What if there is a serious reaction? What should I look for?  Look for anything that concerns you, such as signs of a severe allergic reaction, very high fever, or unusual behavior. Signs of a severe allergic reaction can include hives, swelling of the face and throat, difficulty breathing, a fast heartbeat, dizziness, and weakness  usually within a few minutes to a few hours after the vaccination. What should I do?  If you think it is a severe allergic reaction or other emergency that cant wait, call 9-1-1 or get the person to the nearest hospital. Otherwise, call your doctor. Reactions should be reported to the Vaccine Adverse Event Reporting System (VAERS). Your doctor should file this report, or you can do it yourself through the VAERS web site at www.vaers. hhs.gov, or by calling 5-786.325.8661. VAERS does not give medical advice. 6. The National Vaccine Injury Compensation Program 
 
The [facility] Vaccine Injury Compensation Program (VICP) is a federal program that was created to compensate people who may have been injured by certain vaccines. Persons who believe they may have been injured by a vaccine can learn about the program and about filing a claim by calling 1-392.852.3129 or visiting the [employer] website at www.[facility].gov/vaccinecompensation. There is a time limit to file a claim for compensation. 7. How can I learn more?  Ask your healthcare provider. He or she can give you the vaccine package insert or suggest other sources of information.  Call your local or state health department.  Contact the Centers for Disease Control and Prevention (CDC): 
- Call 2-436.717.2712 (1-800-CDC-INFO) or 
- Visit CDCs website at www.cdc.gov/vaccines Vaccine Information Statement PCV13 Vaccine 11/5/2015  
[address] RAF[address] Mendoza [id number] Department of Health and [employer] Centers for Disease Control and Prevention Office Use Only Medicare Wellness Visit, Male The best way to live healthy is to have a lifestyle where you eat a well-balanced diet, exercise regularly, limit alcohol use, and quit all forms of tobacco/nicotine, if applicable. Regular preventive services are another way to keep healthy. Preventive services (vaccines, screening tests, monitoring & exams) can help personalize your care plan, which helps you manage your own care. Screening tests can find health problems at the earliest stages, when they are easiest to treat. [address] follows the current, evidence-based guidelines published by the [facility] ([facility]) when recommending preventive services for our patients. Because we follow these guidelines, sometimes recommendations change over time as research supports it. (For example, a prostate screening blood test is no longer routinely recommended for men with no symptoms.) Of course, you and your doctor may decide to screen more often for some diseases, based on your risk and co-morbidities (chronic disease you are already diagnosed with). Preventive services for you include: - Medicare offers their members a free annual wellness visit, which is time for you and your primary care provider to discuss and plan for your preventive service needs. Take advantage of this benefit every year! 
-All adults over age 72 should receive the recommended pneumonia vaccines. Current USPSTF guidelines recommend a series of two vaccines for the best pneumonia protection.  
-All adults should have a flu vaccine yearly and an ECG. All adults age 61 and older should receive a shingles vaccine once in their lifetime.   
-All adults age 38-68 who are overweight should have a diabetes screening test once every three years. -Other screening tests & preventive services for persons with diabetes include: an eye exam to screen for diabetic retinopathy, a kidney function test, a foot exam, and stricter control over your cholesterol.  
-Cardiovascular screening for adults with routine risk involves an electrocardiogram (ECG) at intervals determined by the provider.  
-Colorectal cancer screening should be done for adults age 54-65 with no increased risk factors for colorectal cancer. There are a number of acceptable methods of screening for this type of cancer. Each test has its own benefits and drawbacks. Discuss with your provider what is most appropriate for you during your annual wellness visit. The different tests include: colonoscopy (considered the best screening method), a fecal occult blood test, a fecal DNA test, and sigmoidoscopy. 
-All adults born between [facility] should be screened once for Hepatitis C. 
-An Abdominal Aortic Aneurysm (AAA) Screening is recommended for men age 73-68 who has ever smoked in their lifetime. Here is a list of your current Health Maintenance items (your personalized list of preventive services) with a due date: 
Health Maintenance Due Topic Date Due  Shingles Vaccine (1 of 2) 12/13/1981  
 DTaP/Tdap/Td  (1 - Tdap) 05/31/2012  Pneumococcal Vaccine (2 of 2 - PCV13) 01/15/2015 [facility] Annual Well Visit  03/14/2018 [facility] Exam  05/15/2019  Flu Vaccine  08/01/2019  Hemoglobin A1C    09/06/2019 I have reviewed with the patient details of the assessment and plan and all questions were answered. Relevent patient education was performed. The most recent lab findings were reviewed with the patient. An After Visit Summary was printed and given to the patient.  
 
 
Jabier Day MD

## 2019-10-02 NOTE — PROGRESS NOTES
Chief Complaint Patient presents with Val[facility] Annual Wellness Visit  Diabetes  Cholesterol Problem Depression Risk Factor Screening:  
 
3 most recent PHQ Screens 3/6/2019 Little interest or pleasure in doing things Not at all Feeling down, depressed, irritable, or hopeless Not at all Total Score PHQ 2 0 Functional Ability and Level of Safety: Activities of Daily Living ADL Assessment 3/6/2019 Feeding yourself No Help Needed Getting from bed to chair No Help Needed Getting dressed No Help Needed Bathing or showering No Help Needed Walk across the room (includes cane/walker) No Help Needed Using the telphone No Help Needed Taking your medications No Help Needed Preparing meals No Help Needed Managing money (expenses/bills) No Help Needed Moderately strenuous housework (laundry) No Help Needed Shopping for personal items (toiletries/medicines) No Help Needed Shopping for groceries No Help Needed Driving No Help Needed Climbing a flight of stairs No Help Needed Getting to places beyond walking distances No Help Needed Fall Risk Fall Risk Assessment, last 12 mths 3/6/2019 Able to walk? Yes Fall in past 12 months? No  
Fall with injury? -  
Number of falls in past 12 months - Fall Risk Score -  
 
 
Abuse Screen Abuse Screening Questionnaire 3/6/2019 Do you ever feel afraid of your partner? Willie Johnson Are you in a relationship with someone who physically or mentally threatens you? Willie Johnson Is it safe for you to go home? Windy Cavazos Reviewed record in preparation for visit and have obtained necessary documentation. Identified pt with two pt identifiers(name and ). Chief Complaint Patient presents with Valri Mele Annual Wellness Visit  Diabetes  Cholesterol Problem Wt Readings from Last 3 Encounters:  
10/02/19 174 lb 12.8 oz (79.3 kg) 19 173 lb (78.5 kg) 19 170 lb (77.1 kg) Temp Readings from Last 3 Encounters: 10/02/19 98.1 °F (36.7 °C) (Oral)  
07/29/19 97.6 °F (36.4 °C)  
03/06/19 97.9 °F (36.6 °C) (Oral) BP Readings from Last 3 Encounters:  
10/02/19 126/70  
07/29/19 134/67  
03/06/19 124/62 Pulse Readings from Last 3 Encounters:  
10/02/19 64  
07/29/19 (!) 55  
03/06/19 60 Coordination of Care Questionnaire: 
:  
 
1) Have you been to an emergency room, urgent care clinic since your last visit? Yes [facility] for a fall Hospitalized since your last visit? No  
 
 
     
 
2) Have you seen or consulted any other health care providers outside of [address] [place] [address] since your last visit? Yes Dr Cindy Ospina Patient Care Team  
Patient Care Team: 
Joe Vivar MD as PCP - General (Internal Medicine)

## 2019-10-02 NOTE — PATIENT INSTRUCTIONS
Vaccine Information Statement Pneumococcal Conjugate Vaccine (PCV13): What You Need to Know Many Vaccine Information Statements are available in [place] and other languages. See www.immunize.org/vis. Hojas de información Sobre Vacunas están disponibles en español y en muchos otros idiomas. Visite www.immunize.org/vis. 1. Why get vaccinated? Vaccination can protect both children and adults from pneumococcal disease. Pneumococcal disease is caused by bacteria that can spread from person to person through close contact. It can cause ear infections, and it can also lead to more serious infections of the: 
 Lungs (pneumonia),  Blood (bacteremia), and 
 Covering of the brain and spinal cord (meningitis). Pneumococcal pneumonia is most common among adults. Pneumococcal meningitis can cause deafness and brain damage, and it kills about 1 child in 10 who get it. Anyone can get pneumococcal disease, but children under 3years of age and adults 72 years and older, people with certain medical conditions, and cigarette smokers are at the highest risk. Before there was a vaccine, the [facility] saw: 
 more than 700 cases of meningitis, 
 about 13,000 blood infections, 
 about 5 million ear infections, and 
 about 200 deaths 
in children under 5 each year from pneumococcal disease. Since vaccine became available, severe pneumococcal disease in these children has fallen by 88%. About 18,000 older adults die of pneumococcal disease each year in the [place]. Treatment of pneumococcal infections with penicillin and other drugs is not as effective as it used to be, because some strains of the disease have become resistant to these drugs. This makes prevention of the disease, through vaccination, even more important. 2. PCV13 vaccine Pneumococcal conjugate vaccine (called PCV13) protects against 13 types of pneumococcal bacteria. PCV13 is routinely given to children at 2, 4, 6, and 1515 months of age. It is also recommended for children and adults 3to 59years of age with certain health conditions, and for all adults 72years of age and older. Your doctor can give you details. 3. Some people should not get this vaccine Anyone who has ever had a life-threatening allergic reaction to a dose of this vaccine, to an earlier pneumococcal vaccine called PCV7, or to any vaccine containing diphtheria toxoid (for example, DTaP), should not get PCV13. Anyone with a severe allergy to any component of PCV13 should not get the vaccine. Tell your doctor if the person being vaccinated has any severe allergies. If the person scheduled for vaccination is not feeling well, your healthcare provider might decide to reschedule the shot on another day. 4. Risks of a vaccine reaction With any medicine, including vaccines, there is a chance of reactions. These are usually mild and go away on their own, but serious reactions are also possible. Problems reported following PCV13 varied by age and dose in the series. The most common problems reported among children were:  About half became drowsy after the shot, had a temporary loss of appetite, or had redness or tenderness where the shot was given.  About 1 out of 3 had swelling where the shot was given.  About 1 out of 3 had a mild fever, and about 1 in 20 had a fever over 102.2°F. 
 Up to about 8 out of 10 became fussy or irritable. Adults have reported pain, redness, and swelling where the shot was given; also mild fever, fatigue, headache, chills, or muscle pain. Juan José Singh children who get PCV13 along with inactivated flu vaccine at the same time may be at increased risk for seizures caused by fever. Ask your doctor for more information. Problems that could happen after any vaccine:  People sometimes faint after a medical procedure, including vaccination. Sitting or lying down for about 15 minutes can help prevent fainting, and injuries caused by a fall. Tell your doctor if you feel dizzy, or have vision changes or ringing in the ears.  Some older children and adults get severe pain in the shoulder and have difficulty moving the arm where a shot was given. This happens very rarely.  Any medication can cause a severe allergic reaction. Such reactions from a vaccine are very rare, estimated at about 1 in a million doses, and would happen within a few minutes to a few hours after the vaccination. As with any medicine, there is a very small chance of a vaccine causing a serious injury or death. The safety of vaccines is always being monitored. For more information, visit: www.cdc.gov/vaccinesafety/  
 
5. What if there is a serious reaction? What should I look for?  Look for anything that concerns you, such as signs of a severe allergic reaction, very high fever, or unusual behavior. Signs of a severe allergic reaction can include hives, swelling of the face and throat, difficulty breathing, a fast heartbeat, dizziness, and weakness  usually within a few minutes to a few hours after the vaccination. What should I do?  If you think it is a severe allergic reaction or other emergency that cant wait, call 9-1-1 or get the person to the nearest hospital. Otherwise, call your doctor. Reactions should be reported to the Vaccine Adverse Event Reporting System (VAERS). Your doctor should file this report, or you can do it yourself through the VAERS web site at www.vaers. hhs.gov, or by calling 0-479.593.3087. VAERS does not give medical advice. 6. The National Vaccine Injury Compensation Program 
 
The [facility] Vaccine Injury Compensation Program (VICP) is a federal program that was created to compensate people who may have been injured by certain vaccines.  
 
Persons who believe they may have been injured by a vaccine can learn about the program and about filing a claim by calling 8-818.365.2434 or visiting the [employer] website at www.[facility].gov/vaccinecompensation. There is a time limit to file a claim for compensation. 7. How can I learn more?  Ask your healthcare provider. He or she can give you the vaccine package insert or suggest other sources of information.  Call your local or state health department.  Contact the Centers for Disease Control and Prevention (CDC): 
- Call 2-682.205.4584 (1-800-CDC-INFO) or 
- Visit CDCs website at www.cdc.gov/vaccines Vaccine Information Statement PCV13 Vaccine 11/5/2015  
[address] 829CT-48 [facility] [employer] Centers for Disease Control and Prevention Office Use Only Medicare Wellness Visit, Male The best way to live healthy is to have a lifestyle where you eat a well-balanced diet, exercise regularly, limit alcohol use, and quit all forms of tobacco/nicotine, if applicable. Regular preventive services are another way to keep healthy. Preventive services (vaccines, screening tests, monitoring & exams) can help personalize your care plan, which helps you manage your own care. Screening tests can find health problems at the earliest stages, when they are easiest to treat. [facility] follows the current, evidence-based guidelines published by the [place] [employer] (USPSTF) when recommending preventive services for our patients. Because we follow these guidelines, sometimes recommendations change over time as research supports it. (For example, a prostate screening blood test is no longer routinely recommended for men with no symptoms.) Of course, you and your doctor may decide to screen more often for some diseases, based on your risk and co-morbidities (chronic disease you are already diagnosed with). Preventive services for you include: - Medicare offers their members a free annual wellness visit, which is time for you and your primary care provider to discuss and plan for your preventive service needs. Take advantage of this benefit every year! 
-All adults over age 72 should receive the recommended pneumonia vaccines. Current USPSTF guidelines recommend a series of two vaccines for the best pneumonia protection.  
-All adults should have a flu vaccine yearly and an ECG. All adults age 61 and older should receive a shingles vaccine once in their lifetime.   
-All adults age 38-68 who are overweight should have a diabetes screening test once every three years.  
-Other screening tests & preventive services for persons with diabetes include: an eye exam to screen for diabetic retinopathy, a kidney function test, a foot exam, and stricter control over your cholesterol.  
-Cardiovascular screening for adults with routine risk involves an electrocardiogram (ECG) at intervals determined by the provider.  
-Colorectal cancer screening should be done for adults age 54-65 with no increased risk factors for colorectal cancer. There are a number of acceptable methods of screening for this type of cancer. Each test has its own benefits and drawbacks. Discuss with your provider what is most appropriate for you during your annual wellness visit. The different tests include: colonoscopy (considered the best screening method), a fecal occult blood test, a fecal DNA test, and sigmoidoscopy. 
-All adults born between [facility] should be screened once for Hepatitis C. 
-An Abdominal Aortic Aneurysm (AAA) Screening is recommended for men age 73-68 who has ever smoked in their lifetime. Here is a list of your current Health Maintenance items (your personalized list of preventive services) with a due date: 
Health Maintenance Due Topic Date Due  Shingles Vaccine (1 of 2) 12/13/1981  
 DTaP/Tdap/Td  (1 - Tdap) 05/31/2012  Pneumococcal Vaccine (2 of 2 - PCV13) 01/15/2015 [facility] Annual Well Visit  03/14/2018 [facility] Eye Exam  05/15/2019  Flu Vaccine  08/01/2019  Hemoglobin A1C    09/06/2019

## 2019-10-17 NOTE — PERIOP NOTES
EKG dated 6/13/19 and office notes dated 9/19/19 received from Chris Puga NP. Labs dated 10/17/19 and office notes dated 10/3/19 received from Dr. Braden Arceo. Copies on paper chart.

## 2019-10-17 NOTE — PERIOP NOTES
[facility]  Preoperative Instructions        Surgery Date 10/25/19         Time of Arrival 0545 am    Contact # [phone]    1. On the day of your surgery, please report to the Surgical Services Registration Desk and sign in at your designated time. The Surgery Center is located to the right of the Emergency Room. 2. You must have someone with you to drive you home. You should not drive a car for 24 hours following surgery. Please make arrangements for a friend or family member to stay with you for the first 24 hours after your surgery. 3. Do not have anything to eat or drink (including water, gum, mints, coffee, juice) after midnight  10/24/19    . ? This may not apply to medications prescribed by your physician. ?(Please note below the special instructions with medications to take the morning of your procedure.)    4. We recommend you do not drink any alcoholic beverages for 24 hours before and after your surgery. 5. Contact your surgeons office for instructions on the following medications: non-steroidal anti-inflammatory drugs (i.e. Advil, Aleve), vitamins, and supplements. (Some surgeons will want you to stop these medications prior to surgery and others may allow you to take them)  **If you are currently taking Plavix, Coumadin, Aspirin and/or other blood-thinning agents, contact your surgeon for instructions. ** Your surgeon will partner with the physician prescribing these medications to determine if it is safe to stop or if you need to continue taking. Please do not stop taking these medications without instructions from your surgeon    6. Wear comfortable clothes. Wear glasses instead of contacts. Do not bring any money or jewelry. Please bring picture ID, insurance card, and any prearranged co-payment or hospital payment. Do not wear make-up, particularly mascara the morning of your surgery.   Do not wear nail polish, particularly if you are having foot /hand surgery. Wear your hair loose or down, no ponytails, buns, americo pins or clips. All body piercings must be removed. Please shower with antibacterial soap for three consecutive days before and on the morning of surgery, but do not apply any lotions, powders or deodorants after the shower on the day of surgery. Please use a fresh towels after each shower. Please sleep in clean clothes and change bed linens the night before surgery. Please do not shave for 48 hours prior to surgery. Shaving of the face is acceptable. 7. You should understand that if you do not follow these instructions your surgery may be cancelled. If your physical condition changes (I.e. fever, cold or flu) please contact your surgeon as soon as possible. 8. It is important that you be on time. If a situation occurs where you may be late, please call [phone] (OR Holding Area). 9. If you have any questions and or problems, please call [phone] (Pre-admission Testing). 10. Your surgery time may be subject to change. You will receive a phone call the evening prior if your time changes. 11.  If having outpatient surgery, you must have someone to drive you here, stay with you during the duration of your stay, and to drive you home at time of discharge. 12.   In an effort to improve the efficiency, privacy, and safety for all of our Pre-op patients visitors are not allowed in the Holding area. Once you arrive and are registered your family/visitors will be asked to remain in the waiting room. The Pre-op staff will get you from the Surgical Waiting Area and will explain to you and your family/visitors that the Pre-op phase is beginning. The staff will answer any questions and provide instructions for tracking of the patient, by use of the existing tracking number and color-coded status board in the waiting room.   At this time the staff will also ask for your designated spokesperson information in the event that the physician or staff need to provide an update or obtain any pertinent information. The designated spokesperson will be notified if the physician needs to speak to family during the pre-operative phase. If at any time your family/visitors has questions or concerns they may approach the volunteer desk in the waiting area for assistance. Special Instructions:    MEDICATIONS TO TAKE THE MORNING OF SURGERY WITH A SIP OF WATER: Tylenol if needed, Furosemide, Hydrocortisone, Tramadol if needed      I understand a pre-operative phone call will be made to verify my surgery time. In the event that I am not available, I give permission for a message to be left on my answering service and/or with another person?   yes         ___________________      __________   _________    (Signature of Patient)             (Witness)                (Date and Time)

## 2019-10-21 NOTE — PERIOP NOTES
During PAT assessment, called Dr. Margueritte Denver office and spoke with BODØ regarding Eliquis instructions. BODØ was to speak with Dr. Caterina Freeman and get back with patient. Called patient 10/21/19 and left message. Finally spoke with patient at [phone]. BODØ had not gotten back with patient regarding instructions. Pt to call Dr. Margueritte Denver office on 10/22/19. PAT also to follow-up.

## 2019-10-22 NOTE — PERIOP NOTES
Patient called and has not been able to get through Dr. Marly Redding office to get Eliquis instructions. I just spoke to [facility] in Dr. Marly Redding office. Per Dr. Jeannine Freire,  the patient is to stop Eliquis 3 days prior to surgery. She is going to call the patient and give the instructions to him.

## 2019-10-25 NOTE — BRIEF OP NOTE
BRIEF OPERATIVE NOTE Date of Procedure: 10/25/2019 Preoperative Diagnosis: LOWER LID BCC Postoperative Diagnosis: LOWER LID BCC Procedure(s): EXCISION LEFT LOWER LID LESION WITH  SKIN GRAFT 1.1 x 1.1cm Surgeon(s) and Role: Ji Smith MD - Primary Surgical Assistant: Jp Fernandez 
 
Surgical Staff: 
Circ-1: Cat Romero Scrub Tech-1: Oscar Mccullough Surg Asst-1: Joie Gonzalez Event Time In Time Out Incision Start 3997 Incision Close 2386 Anesthesia: MAC Estimated Blood Loss: <5ml Specimens:  
ID Type Source Tests Collected by Time Destination 1 : left lower eyelid basal cell carcinoma stitch superior Frozen Section Eyelid  Hilario Elizalde MD 10/25/2019 5729 Pathology Findings: see note Complications: none Implants: * No implants in log *

## 2019-10-25 NOTE — ANESTHESIA POSTPROCEDURE EVALUATION
Procedure(s): EXCISION LEFT LOWER LID LESION WITH  SKIN GRAFT. total IV anesthesia, MAC Anesthesia Post Evaluation Patient location during evaluation: PACU Note status: Adequate. Level of consciousness: responsive to verbal stimuli and sleepy but conscious Pain management: satisfactory to patient Airway patency: patent Anesthetic complications: no 
Cardiovascular status: acceptable Respiratory status: acceptable Hydration status: acceptable Comments: +Post-Anesthesia Evaluation and Assessment Patient: Yvon Rogel MRN: [id number]  SSN: [id number] YOB: 1931  Age: 80 y.o. Sex: male Cardiovascular Function/Vital Signs /58 (BP 1 Location: Left arm, BP Patient Position: At rest)   Pulse 64   Temp 36.7 °C (98 °F)   Resp 14   Ht 5' 8\" (1.727 m)   Wt 78.7 kg (173 lb 8 oz)   SpO2 96%   BMI 26.38 kg/m² Patient is status post Procedure(s): EXCISION LEFT LOWER LID LESION WITH  SKIN GRAFT. Nausea/Vomiting: Controlled. Postoperative hydration reviewed and adequate. Pain: 
Pain Scale 1: Numeric (0 - 10) (10/25/19 1000) Pain Intensity 1: 0 (10/25/19 1000) Managed. Neurological Status:  
Neuro (WDL): Within Defined Limits (10/25/19 0930) At baseline. Mental Status and Level of Consciousness: Arousable. Pulmonary Status:  
O2 Device: Room air (10/25/19 1000) Adequate oxygenation and airway patent. Complications related to anesthesia: None Post-anesthesia assessment completed. No concerns. Signed By: Serafin Garvey MD  
 10/25/2019 Post anesthesia nausea and vomiting:  controlled Vitals Value Taken Time /58 10/25/2019 10:00 AM  
Temp 36.7 °C (98 °F) 10/25/2019 10:00 AM  
Pulse 62 10/25/2019 10:15 AM  
Resp 17 10/25/2019 10:15 AM  
SpO2 95 % 10/25/2019 10:15 AM  
Vitals shown include unvalidated device data.

## 2019-10-25 NOTE — PERIOP NOTES
Handoff Report from Operating Room to PACU Report received from [address] [place] [address] and Tyler Leigh CRNA regarding Yvon Rogel. Surgeon(s): 
Jack Selby MD  And Procedure(s) (LRB): 
EXCISION LEFT LOWER LID LESION WITH  SKIN GRAFT (Bilateral)  confirmed  
with allergies, drains and dressings discussed. Anesthesia type, drugs, patient history, complications, estimated blood loss, vital signs, intake and output, and last pain medication, lines, reversal medications and temperature were reviewed.

## 2019-10-25 NOTE — OP NOTES
[address] 
OPERATIVE REPORT Name:  Jhonny Johnson 
MR#:  [id number] :  [id number] ACCOUNT #:  [de-identified] DATE OF SERVICE:  10/25/2019 PREOPERATIVE DIAGNOSIS:  Basal cell carcinoma of the left lower eyelid. POSTOPERATIVE DIAGNOSIS:  Basal cell carcinoma of the left lower eyelid. PROCEDURE PERFORMED: 
1. Excision, left lower eyelid basal cell carcinoma measuring 1.1 x 1.1 cm. 
2.  Full-thickness skin graft reconstruction of left lower eyelid measuring 1.1 x 1.1 cm. SURGEON:  Trena Sloan MD 
 
ASSISTANT:  Damion Winn. ANESTHESIA:  Local with IV sedation. COMPLICATIONS:  None. SPECIMENS REMOVED:  Left lower eyelid basal cell carcinoma. IMPLANTS:  None. ESTIMATED BLOOD LOSS:  Less than 5 mL. INDICATIONS:  This 80-year-old white male presented with biopsy-proven basal cell carcinoma of the left lower eyelid that required excision and reconstruction. PROCEDURE:  After informed consent was obtained, under IV sedation, the left lower and right upper eyelids were infiltrated with 1% lidocaine with epinephrine, and the face was prepped and draped. Attention was turned to the left lower eyelid, where under loupe magnification, the visible lesion and biopsy scar were excised leaving at least 2-3 mm margins peripherally. The excision went deep to the level of the orbicularis oculi muscle. The specimen was marked with a single stitch superiorly and sent for frozen section, which revealed basal cell carcinoma with free margins. Attention was then turned to the reconstruction. The defect could not be closed primarily. A full-thickness skin graft was harvested as a transverse ellipse from the right upper eyelid. This was done with a single dermal layer. The donor site was closed in a single layer with 6-0 fast absorbing plain gut.   The graft was then fenestrated with a needle, cut to the appropriate size, and secured to the left lower eyelid wound bed using 6-0 fast absorbing plain gut. Antibiotic ointment was applied. He tolerated the procedure well and was transferred to the recovery room in good condition. MD ENRIKE [email or web address]/[id number] 
D:  10/25/2019 9:34 
T:  10/25/2019 14:23 
JOB #:  [id number] CC:  MD Myles Calabrese MD Wynona Luria, MD

## 2019-10-25 NOTE — H&P
Preoperative Plastic Surgery History and Physical 
 
Subjective:  
  
Dean Torres is a 80 y.o. male who presents for excision and reconstruction left lower eyelid BCC. Past Medical History:  
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 hodges, radiologist  
 Atrial flutter [facility])   
 s/p cardioversion 2/2019  Cancer [facility]) 2013 LEFT FOOT ,MELANOMA and right heel  Cancer ([address] 1980 RIGHT KIDNEY  
 Cancer [facility]) 1988 PROSTATE  TREATED WITH RADIATION  Chronic pain BACK AND RIGHT HIP  
 Diabetes mellitus, stable ([address] 7/13/2017 Steroid induced  Hyperglycemia 7/13/2017 Steroid induced: follow up A1C, BMP, discontinue Starlix in wnl  LBP (low back pain)  Left hip pain 7/13/2017 Xray shows no obvious Fx or Metastatic lesions, this pain could be arthritic in nature or radicular, will give a corticosteroid injection, if no benefit may need Ortho eval  
 Malignant neoplasm of prostate ([address] 7/13/2017  OA (osteoarthritis) spine  Prostate cancer ([address] 1990s  Pure hyperglyceridemia 7/13/2017  Renal cell cancer (right)  Small bowel obstruction ([address] 7/13/2017  Thromboembolus ([address] 2015  
 right leg Past Surgical History:  
Procedure Laterality Date [address] MITRAL VALVE REPAIR  
 [address]  HX CATARACT REMOVAL Bilateral 2011  HX LUMBAR LAMINECTOMY  1990  
 HX ORTHOPAEDIC    
 CARPAL TUNNEL-SERAFIN  HX OTHER SURGICAL  OCT 2014 MELANOMA REMOVED FROM HEEL  
 [facility] TURP - prostate not removed due to location of tumor [address] NEPHRECTOMY  HX VASCULAR ACCESS Left 2016  
 port-a-cath  MO COLSC FLX W/REMOVAL LESION BY HOT BX FORCEPS  9/24/2012  MO SIGMOIDOSCOPY FLX DX W/COLLJ SPEC BR/WA IF PFRMD  9/21/2012 Family History Problem Relation Age of Onset  Diabetes Father  Dementia Mother  Cancer Sister BRAIN  
 Heart Disease Brother  Arthritis-osteo Sister  Anesth Problems Neg Hx Social History Tobacco Use  Smoking status: Former Smoker Packs/day: 0.25 Years: 2.00 Pack years: 0.50 Last attempt to quit: 1953 Years since quittin.5  Smokeless tobacco: Never Used Substance Use Topics  Alcohol use: No  
  
Prior to Admission medications Medication Sig Start Date End Date Taking? Authorizing Provider  
furosemide (LASIX) 20 mg tablet TAKE 1 TABLET BY MOUTH EVERY DAY 19  Yes Carol Cota MD  
nateglinide (STARLIX) 60 mg tablet TAKE 1 TAB BY MOUTH BEFORE BREAKFAST, LUNCH, AND DINNER. 19  Yes Carol Cota MD  
amiodarone (CORDARONE) 200 mg tablet Take two (2) tablets by mouth daily Patient taking differently: 200 mg every evening. Take two (2) tablets by mouth daily 3/21/19  Yes Carol Cota MD  
traMADol Tonye You) 50 mg tablet Take 25-50 mg by mouth every eight (8) hours as needed for Pain. Yes Other, MD Nicki  
calcium carbonate (CALCIUM 600) 600 mg calcium (1,500 mg) tablet Take 600 mg by mouth daily. Yes Provider, Historical  
hydrocortisone (CORTEF) 10 mg tablet TAKE 1 TABLET BY MOUTH TWICE A DAY. now daily 17  Yes Provider, Historical  
PREVIDENT 5000 BOOSTER PLUS 1.1 % pste BRUSH AFTER BREAKFAST AND SUPPER CAN ALSO USE IN FLUORIDE TRAY 17  Yes Provider, Historical  
bimatoprost (LUMIGAN) 0.01 % ophthalmic drops Administer 1 Drop to both eyes nightly. Yes Provider, Historical  
cholecalciferol (VITAMIN D3) 1,000 unit tablet Take 1,000 Units by mouth daily. Yes Provider, Historical  
acetaminophen (TYLENOL EXTRA STRENGTH) 500 mg tablet Take 500 mg by mouth every six (6) hours as needed for Pain. Yes Provider, Historical  
apixaban (ELIQUIS) 5 mg tablet Take 2.5 mg by mouth two (2) times a day. Other, MD Nicki  
  
Allergies Allergen Reactions  Nabumetone Itching and Hives Review of Systems: 
Pertinent items are noted in the History of Present Illness. Objective:  
 
  
Patient Vitals for the past 8 hrs: 
 BP Temp Pulse Resp SpO2 Height Weight 10/25/19 0747 171/76 97.7 °F (36.5 °C) (!) 58 17 99 % 5' 8\" (1.727 m) 78.7 kg (173 lb 8 oz) Temp (24hrs), Av.7 °F (36.5 °C), Min:97.7 °F (36.5 °C), Max:97.7 °F (36.5 °C) Physical Exam: 
General: WD, WN 
HEENT: normocephalic, eyes clear Neck: Supple, no masses Chest: Clear Heart: Regular rate and rhythm Abdomen: soft, NT, ND Breasts: NA Extremities: no CCE Neuro: nonfocal 
Skin: sclerotic nodular lesion left lower eyelid Assessment:  
 
BCC Plan:  
 
Excision and reconstruction with local flap or skin graft. Discussed the benefits, alternatives, anticipated recovery, and risks of surgery, including but not limited to bleeding, infection, recurrence, ectropion, reconstructive failre. The patient voiced understanding; any and all questions were answered to the patient's satisfaction. Signed By: Dariel Collet, MD   
 2019

## 2019-10-25 NOTE — ANESTHESIA PREPROCEDURE EVALUATION
Anesthetic History No history of anesthetic complications Review of Systems / Medical History Patient summary reviewed, nursing notes reviewed and pertinent labs reviewed Pulmonary Pneumonia and smoker Comments: Former smoker, 0.5 pack years Neuro/Psych Within defined limits Comments: Middle cerebral artery aneurysm Cardiovascular Valvular problems/murmurs: mitral insufficiency CHF: dyspnea on exertion Dysrhythmias : atrial fibrillation Exercise tolerance: <4 METS Comments: ECHO from 11/2018 showed a 55-60% EF with mild MR and a mitral ring in place Cath 1/2019 showed a 25% EF with normal coronaries GI/Hepatic/Renal 
Within defined limits Endo/Other Diabetes: well controlled, type 2 Arthritis and cancer Comments: Steroid induced diabetes Other Findings Comments: Melanoma Renal cell and prostate cancer Physical Exam 
 
Airway Mallampati: II 
TM Distance: > 6 cm Neck ROM: normal range of motion Mouth opening: Normal 
 
 Cardiovascular Regular rate and rhythm,  S1 and S2 normal,  no murmur, click, rub, or gallop Rhythm: regular Rate: normal 
 
 
 
 Dental 
 
Dentition: Upper partial plate Pulmonary Breath sounds clear to auscultation Abdominal 
GI exam deferred Other Findings Anesthetic Plan ASA: 3 Anesthesia type: total IV anesthesia and MAC Monitoring Plan: BIS Induction: Intravenous Anesthetic plan and risks discussed with: Patient LMAs used here multiple times for procedures

## 2019-10-25 NOTE — PERIOP NOTES
TRANSFER - OUT REPORT: 
 
Verbal report given to [facility] RN (name) on Phillip Padgett  being transferred to Phase II (unit) for routine progression of care Report consisted of patients Situation, Background, Assessment and  
Recommendations(SBAR). Information from the following report(s) SBAR, OR Summary, MAR and Cardiac Rhythm NSR was reviewed with the receiving nurse. Opportunity for questions and clarification was provided. Patient transported with: 
 Registered Nurse

## 2019-10-25 NOTE — DISCHARGE INSTRUCTIONS
May shower tomorrow and wash face and get eyelids wet. Apply thin layer of antibiotic ointment twice daily to left lower and right upper eyelids. Keep head elevated as much as possible to reduce swelling, may use cool compresses or packs over eyes. DISCHARGE SUMMARY from Nurse    PATIENT INSTRUCTIONS:    After general anesthesia or intravenous sedation, for 24 hours or while taking prescription Narcotics:  · Limit your activities  · Do not drive and operate hazardous machinery  · Do not make important personal or business decisions  · Do  not drink alcoholic beverages  · If you have not urinated within 8 hours after discharge, please contact your surgeon on call. Report the following to your surgeon:  · Excessive pain, swelling, redness or odor of or around the surgical area  · Temperature over 100.5  · Nausea and vomiting lasting longer than 4 hours or if unable to take medications  · Any signs of decreased circulation or nerve impairment to extremity: change in color, persistent  numbness, tingling, coldness or increase pain  · Any questions    What to do at Home:  A common side effect of anesthesia following surgery is nausea and/or vomiting. In order to decrease symptoms, it is wise to avoid foods that are high in fat, greasy foods, milk products, and spicy foods for the first 24 hours. Acceptable foods for the first 24 hours following surgery include but are not limited to:     soup   broth    toast    crackers    applesauce    bananas    mashed potatoes,   soft or scrambled eggs   oatmeal    jello    It is important to eat when taking your pain medication. This will help to prevent nausea. If possible, please try to time your meals with your medications. It is very important to stay hydrated following surgery. Sip fluids frequently while awake. Avoid acidic drinks such as citrus juices and soda for 24 hours. Carbonated beverages may cause bloating and gas. Acceptable fluids include:    - water (flavor packets may add variety)  - coffee or tea (in moderation)  - Gatorade  - Sorin-aid  - apple juice  - cranberry juice    You are encouraged to cough and deep breathe every hour when awake. This will help to prevent respiratory complications following anesthesia. You may want to hug a pillow when coughing and sneezing to add additional support to the surgical area and to decrease discomfort if you had abdominal or chest surgery. If you are discharged home with support stockings, you may remove them after 24 hours. Support stockings are used to help prevent blood clots in the legs following surgery. Please take time to review all of your Home Care Instructions and Medication Information sheets provided in your discharge packet. If you have any questions, please contact your surgeons office. Thank you. TO PREVENT AN INFECTION      1. [address] YOUR HANDS     To prevent infection, good handwashing is the most important thing you or your caregiver can do.  Wash your hands with soap and water or use the hand  we gave you before you touch any wounds. 2. SHOWER     Use the antibacterial soap we gave you when you take a shower.  Shower with this soap until your wounds are healed.  To reach all areas of your body, you may need someone to help you.  Dont forget to clean your belly button with every shower. 3.  USE CLEAN SHEETS     Use freshly cleaned sheets on your bed after surgery.  To keep the surgery site clean, do not allow pets to sleep with you while your wound is still healing. 4. STOP SMOKING     Stop smoking, or at least cut back on smoking     Smoking slows your healing. 5.  CONTROL YOUR BLOOD SUGAR     High blood sugars slow wound healing.  If you are diabetic, control your blood sugar levels before and after your surgery.      Patient Education      Narcotic-Analgesic/Acetaminophen (Percocet, [address] [facility], [facility], Lortab 10/325) - (By mouth)   Why this medicine is used:   Relieves pain. Contact a nurse or doctor right away if you have:  · Extreme weakness, shallow breathing, slow heartbeat  · Severe confusion, lightheadedness, dizziness, fainting  · Yellow skin or eyes, dark urine or pale stools  · Severe constipation, severe stomach pain, nausea, vomiting, loss of appetite  · Sweating or cold, clammy skin     Common side effects:  · Mild constipation, nausea, vomiting  · Sleepiness, tiredness  · Itching, rash  © 2017 [facility] Information is for End User's use only and may not be sold, redistributed or otherwise used for commercial purposes. No smoking/ No tobacco products/ Avoid exposure to second hand smoke  Surgeon General's Warning:  Quitting smoking now greatly reduces serious risk to your health. Obesity, smoking, and sedentary lifestyle greatly increases your risk for illness    A healthy diet, regular physical exercise & weight monitoring are important for maintaining a healthy lifestyle    You may be retaining fluid if you have a history of heart failure or if you experience any of the following symptoms:  Weight gain of 3 pounds or more overnight or 5 pounds in a week, increased swelling in our hands or feet or shortness of breath while lying flat in bed. Please call your doctor as soon as you notice any of these symptoms; do not wait until your next office visit. The discharge information has been reviewed with the {PATIENT PARENT GUARDIAN:48768}. The {PATIENT PARENT GUARDIAN:49095} verbalized understanding. Discharge medications reviewed with the {Dishcar meds reviewed ILKD:59805} and appropriate educational materials and side effects teaching were provided.   ___________________________________________________________________________________________________________________________________

## 2019-11-20 PROBLEM — I61.9 INTRACEREBRAL HEMORRHAGE (HCC): Status: ACTIVE | Noted: 2019-01-01

## 2019-11-20 NOTE — CONSULTS
Neurosurgery Pt seen and examined, full consult to follow. Huge ICH left side 6x6. Unresponsive, bilateral extensor posturing. Apneic pauses. Family has spoken and did not want any heroic measures. I concur. Will make him DNR, Palliative care

## 2019-11-20 NOTE — PROGRESS NOTES
TRANSFER - IN REPORT: 
 
Verbal report received from [facility] on Yamel Cain  being received from ED for End of Life Care Report consisted of patients Situation, Background, Assessment and  
Recommendations(SBAR). Information from the following report(s) SBAR, Kardex, ED Summary, Intake/Output, MAR, Accordion and Recent Results was reviewed with the receiving nurse. Opportunity for questions and clarification was provided. Assessment completed upon patients arrival to unit and care assumed. Family at the bedside.

## 2019-11-20 NOTE — ED NOTES
Level 2 stroke alert paged overheard, patient transported to ct via stretcher with RN, Dr. Sobeida Espino, and monitor.

## 2019-11-20 NOTE — ED PROVIDER NOTES
EMERGENCY DEPARTMENT HISTORY AND PHYSICAL EXAM 
 
 
Date: 11/20/2019 Patient Name: Zaria Culver History of Presenting Illness Chief Complaint Patient presents with  Altered mental status Per ems, patient found unresponsive at 0645 this am. Last known well 10pm last night. History Provided By: EMS 
 
HPI: Zaria Culver, 80 y.o. male with PMHx significant for history of cerebral aneurysm, history of DVT, history of a flutter on Eliquis, presents to the ED with cc of severe altered mental status since 10 PM last night. Patient's last known well time was 10 PM he was in his normal state of health. This month morning the wife found patient with sonorous respirations and patient was found to be unresponsive. 911 was called. Blood sugars were reportedly in the 200 range. EMS reports no recent illnesses or change in medications. History is severely limited by patient's altered mental status. No reported other associated symptoms. No other reported other exacerbating or militating factors. There are no other complaints, changes, or physical findings at this time. PCP: Sherwin Willett MD 
 
No current facility-administered medications on file prior to encounter. Current Outpatient Medications on File Prior to Encounter Medication Sig Dispense Refill  furosemide (LASIX) 20 mg tablet TAKE 1 TABLET BY MOUTH EVERY DAY 90 Tab 3  
 nateglinide (STARLIX) 60 mg tablet TAKE 1 TAB BY MOUTH BEFORE BREAKFAST, LUNCH, AND DINNER. 270 Tab 1  
 amiodarone (CORDARONE) 200 mg tablet Take two (2) tablets by mouth daily (Patient taking differently: 200 mg every evening. Take two (2) tablets by mouth daily) 180 Tab 3  
 traMADol (ULTRAM) 50 mg tablet Take 25-50 mg by mouth every eight (8) hours as needed for Pain.  calcium carbonate (CALCIUM 600) 600 mg calcium (1,500 mg) tablet Take 600 mg by mouth daily.  hydrocortisone (CORTEF) 10 mg tablet TAKE 1 TABLET BY MOUTH TWICE A DAY. now daily  10  
 PREVIDENT 5000 BOOSTER PLUS 1.1 % pste BRUSH AFTER BREAKFAST AND SUPPER CAN ALSO USE IN FLUORIDE TRAY  12  
 bimatoprost (LUMIGAN) 0.01 % ophthalmic drops Administer 1 Drop to both eyes nightly.  cholecalciferol (VITAMIN D3) 1,000 unit tablet Take 1,000 Units by mouth daily.  apixaban (ELIQUIS) 5 mg tablet Take 2.5 mg by mouth two (2) times a day.  acetaminophen (TYLENOL EXTRA STRENGTH) 500 mg tablet Take 500 mg by mouth every six (6) hours as needed for Pain. Past History Past Medical History: 
Past Medical History:  
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 hodges, radiologist  
 Atrial flutter [facility])   
 s/p cardioversion 2/2019  Cancer [facility]) 2013 LEFT FOOT ,MELANOMA and right heel  Cancer ([address] 1980 RIGHT KIDNEY  
 Cancer [facility]) 1988 PROSTATE  TREATED WITH RADIATION  Chronic pain BACK AND RIGHT HIP  
 Diabetes mellitus, stable ([address] 7/13/2017 Steroid induced  Hyperglycemia 7/13/2017 Steroid induced: follow up A1C, BMP, discontinue Starlix in wnl  LBP (low back pain)  Left hip pain 7/13/2017 Xray shows no obvious Fx or Metastatic lesions, this pain could be arthritic in nature or radicular, will give a corticosteroid injection, if no benefit may need Ortho eval  
 Malignant neoplasm of prostate ([address] 7/13/2017  OA (osteoarthritis) spine  Prostate cancer ([address] 1990s  Pure hyperglyceridemia 7/13/2017  Renal cell cancer (right)  Small bowel obstruction ([address] 7/13/2017  Thromboembolus ([address] 2015  
 right leg Past Surgical History: 
Past Surgical History:  
Procedure Laterality Date [address] MITRAL VALVE REPAIR  
 [address]  HX CATARACT REMOVAL Bilateral 2011  HX LUMBAR LAMINECTOMY  1990  
 HX ORTHOPAEDIC    
 CARPAL TUNNEL-SERAFIN  HX OTHER SURGICAL  OCT 2014 MELANOMA REMOVED FROM HEEL  
 [address] [facility] TURP - prostate not removed due to location of tumor [address] NEPHRECTOMY  HX VASCULAR ACCESS Left 2016  
 port-a-cath  [place] COLSC FLX W/REMOVAL LESION BY HOT BX FORCEPS  2012  [place] SIGMOIDOSCOPY FLX DX W/COLLJ SPEC BR/WA IF PFRMD  2012 Family History: 
Family History Problem Relation Age of Onset  Diabetes Father  Dementia Mother  Cancer Sister BRAIN  
 Heart Disease Brother  Arthritis-osteo Sister  Anesth Problems Neg Hx Social History: 
Social History Tobacco Use  Smoking status: Former Smoker Packs/day: 0.25 Years: 2.00 Pack years: 0.50 Last attempt to quit: 1953 Years since quittin.6  Smokeless tobacco: Never Used Substance Use Topics  Alcohol use: No  
 Drug use: No  
 
 
Allergies: Allergies Allergen Reactions  Nabumetone Itching and Hives Review of Systems Review of Systems Unable to perform ROS: Patient unresponsive Physical Exam  
Physical Exam 
Vitals signs and nursing note reviewed. Constitutional:   
   Appearance: He is well-developed. Comments: Patient is unwrapped responsive to verbal stimuli and responds in all 4 extremities to painful stimuli. Intermittent posturing with extension of both upper and lower extremities in response to pain. His pupils are 3 mm and reactive bilaterally. They are symmetric in nature. HENT:  
   Head: Normocephalic and atraumatic. Mouth/Throat:  
   Pharynx: No oropharyngeal exudate. Eyes:  
   Conjunctiva/sclera: Conjunctivae normal.  
   Pupils: Pupils are equal, round, and reactive to light. Neck: Musculoskeletal: Normal range of motion. Cardiovascular:  
   Rate and Rhythm: Normal rate and regular rhythm. Heart sounds: No murmur. Pulmonary: Effort: Pulmonary effort is normal. No respiratory distress. Breath sounds: Normal breath sounds. No wheezing. Comments: Sonorous respirations Abdominal:  
   General: Bowel sounds are normal. There is no distension. Palpations: Abdomen is soft. Tenderness: There is no tenderness. Musculoskeletal: Normal range of motion. General: No deformity. Skin: 
   General: Skin is warm and dry. Findings: No rash. Neurological:  
   Coordination: Coordination normal.  
   Comments: Patient responsive only to painful stimuli as stated above. Unable to cooperate with full neurologic assessment. Exten extensor posturing in all 4 extremities. Diagnostic Study Results Labs - Recent Results (from the past 24 hour(s)) GLUCOSE, POC Collection Time: 11/20/19  8:20 AM  
Result Value Ref Range Glucose (POC) 168 (H) 65 - 100 mg/dL Performed by Suzie Murphy (traveller) CBC WITH AUTOMATED DIFF Collection Time: 11/20/19  8:48 AM  
Result Value Ref Range WBC 14.5 (H) 4.1 - 11.1 K/uL  
 RBC 4.31 4.10 - 5.70 M/uL  
 HGB 12.9 12.1 - 17.0 g/dL HCT 41.4 36.6 - 50.3 % MCV 96.1 80.0 - 99.0 FL  
 MCH 29.9 26.0 - 34.0 PG  
 MCHC 31.2 30.0 - 36.5 g/dL  
 RDW 13.3 11.5 - 14.5 % PLATELET 268 037 - 649 K/uL MPV 12.8 8.9 - 12.9 FL  
 NRBC 0.0 0  WBC ABSOLUTE NRBC 0.00 0.00 - 0.01 K/uL NEUTROPHILS 79 (H) 32 - 75 % LYMPHOCYTES 9 (L) 12 - 49 % MONOCYTES 7 5 - 13 % EOSINOPHILS 4 0 - 7 % BASOPHILS 1 0 - 1 % IMMATURE GRANULOCYTES 0 0.0 - 0.5 % ABS. NEUTROPHILS 11.5 (H) 1.8 - 8.0 K/UL  
 ABS. LYMPHOCYTES 1.3 0.8 - 3.5 K/UL  
 ABS. MONOCYTES 1.1 (H) 0.0 - 1.0 K/UL  
 ABS. EOSINOPHILS 0.6 (H) 0.0 - 0.4 K/UL  
 ABS. BASOPHILS 0.1 0.0 - 0.1 K/UL  
 ABS. IMM. GRANS. 0.1 (H) 0.00 - 0.04 K/UL  
 DF AUTOMATED METABOLIC PANEL, COMPREHENSIVE Collection Time: 11/20/19  8:48 AM  
Result Value Ref Range  Sodium 138 136 - 145 mmol/L  
 Potassium 4.0 3.5 - 5.1 mmol/L Chloride 108 97 - 108 mmol/L  
 CO2 22 21 - 32 mmol/L Anion gap 8 5 - 15 mmol/L Glucose 176 (H) 65 - 100 mg/dL BUN 26 (H) 6 - 20 MG/DL Creatinine 1.12 0.70 - 1.30 MG/DL  
 BUN/Creatinine ratio 23 (H) 12 - 20 GFR est AA >60 >60 ml/min/1.73m2 GFR est non-AA >60 >60 ml/min/1.73m2 Calcium 8.2 (L) 8.5 - 10.1 MG/DL Bilirubin, total 0.5 0.2 - 1.0 MG/DL  
 ALT (SGPT) 18 12 - 78 U/L  
 AST (SGOT) 15 15 - 37 U/L Alk. phosphatase 65 45 - 117 U/L Protein, total 6.6 6.4 - 8.2 g/dL Albumin 3.5 3.5 - 5.0 g/dL Globulin 3.1 2.0 - 4.0 g/dL A-G Ratio 1.1 1.1 - 2.2 PROTHROMBIN TIME + INR Collection Time: 11/20/19  8:48 AM  
Result Value Ref Range INR 1.0 0.9 - 1.1 Prothrombin time 10.6 9.0 - 11.1 sec Radiologic Studies -  
CT CODE NEURO HEAD WO CONTRAST Final Result IMPRESSION: Large left thalamic/parietal intraparenchymal hematoma with  
associated edema. Separate 2.0 cm hypodensity at the left temporal lobe is  
likely related to a large aneurysm. Significant subfalcine herniation and  
transtentorial herniation with trapping of the right lateral ventricle. Intraventricular hemorrhage. The findings were discussed with Dr. Ragini Zavala on 11/20/2019 at 8:30 AM by myself. [address] Results  (Last 48 hours)  
          
 11/20/19 0830  CT CODE NEURO HEAD WO CONTRAST Final result Impression:  IMPRESSION: Large left thalamic/parietal intraparenchymal hematoma with  
associated edema. Separate 2.0 cm hypodensity at the left temporal lobe is  
likely related to a large aneurysm. Significant subfalcine herniation and  
transtentorial herniation with trapping of the right lateral ventricle. Intraventricular hemorrhage. The findings were discussed with Dr. Ragini Zavala on 11/20/2019 at 8:30 AM by myself. [address] Narrative:  EXAM: CT CODE NEURO HEAD WO CONTRAST INDICATION: Code Stroke COMPARISON: 7/29/2019. CONTRAST: None. TECHNIQUE: Unenhanced CT of the head was performed using 5 mm images. Brain and  
bone windows were generated. CT dose reduction was achieved through use of a  
standardized protocol tailored for this examination and automatic exposure  
control for dose modulation. FINDINGS:  
Examination demonstrates a 6.0 x 10.1 x 5.6 cm (volume 212 mL). Left  
thalamic/parietal intraparenchymal hematoma with associated edema. Separate 2.0  
cm hypodensity in the left temporal lobe raises the possibility of a large  
aneurysm. This is causing significant subfalcine herniation of approximately 16  
mm and trapping of the right lateral ventricle. There is also transtentorial  
herniation with effacement of the basilar cisterns. Intraventricular hemorrhage  
is noted. CXR Results  (Last 48 hours) None Medical Decision Making I am the first provider for this patient. I reviewed the vital signs, available nursing notes, past medical history, past surgical history, family history and social history. Vital Signs-Reviewed the patient's vital signs. Patient Vitals for the past 24 hrs: 
 Pulse Resp BP SpO2  
11/20/19 0810 80 16 135/73 98 % Pulse Oximetry Analysis -96 % on room air Cardiac Monitor:  
Rate: 75 bpm 
Rhythm: Normal Sinus Rhythm Records Reviewed: Nursing Notes and Old Medical Records Differential Diagnosis: 
 
Patient presenting with altered mental status. Pt has stable vitals and POC glucose was checked immediately upon arrival. DDx: medication toxicity, infection, anemia, electrolyte/metabolic anomoly, hypercapnea, stroke/bleed/mass, dehydration, illicit drug intoxication. Will obtain labwork, UA, EKG and CT imaging of the head, chest xray. Will consider adding toxicologic workup if history unclear or warrants further investigation of toxic source. Will continue to monitor and reassess for admission. Provider Notes (Medical Decision Making):  
Patient with large, devastating intracerebral hemorrhage with signs of herniation. Patient with GCS of 3 and not protecting his airway. However, long discussion had with the patient, the neurosurgical team, and myself determined that patient should remain DNR on comfort measures only. Patient seen by the oncology team will admit him for comfort care. ED Course:  
 
Initial assessment performed. The patients presenting problems have been discussed, and they are in agreement with the care plan formulated and outlined with them. I have encouraged them to ask questions as they arise throughout their visit. ED Course as of Nov 20 1120 Wed Nov 20, 2019  
2312 Patient seen and evaluated at the bedside. Patient was last known normal at 10 PM last night. At this time I am activating a level 2 code stroke pathway. Medics state that patient is DNR, however, family said they are questioning this. Patient is not deciding at this time and responding to painful stimuli is therefore I will activate the code stroke process and discuss CODE STATUS further when family arrives. High clinical concern for hemorrhagic stroke versus metabolic encephalopathy versus basilar artery occlusion.  
 [CC] 0619 Patient with large intraparenchymal hemorrhage with central falcine herniation and intraventricular bleeding. Discussing patient's prognosis with the family and they determine if they want him intubated. I placed a stat neurosurgical consult and awaiting callback at this time. [CC] 4493 Case discussed with neurosurgery Dr. Fuentes York who was walking over to speak with the family about prognosis and next steps. [CC] 4430 Case discussed with neurosurgery again at the bedside. Given the devastating extent of his bleed there is no hope for meaningful recovery. Will place patient on DNR status make patient comfortable at this time. [CC] 5363 Giving low-dose of morphine and glycopyrrolate for comfort measures. Palliative Care consult was placed. [CC] 1118 Case discussed with Dr. Lola Kirkland who is the patient's oncologist and knows him well. She is seeing him and will admit him for comfort care to our service. [CC] ED Course User Index 
[CC] Elizabeth De Luna MD  
 
 
Critical Care Time: CRITICAL CARE NOTE : 
 
11:19 AM 
 
 
IMPENDING DETERIORATION -CNS 
 
ASSOCIATED RISK FACTORS - CNS Decompensation MANAGEMENT- Bedside Assessment and Supervision of Care INTERPRETATION -  CT Scan INTERVENTIONS - Neurologic interventions CASE REVIEW - Hospitalist and Medical Sub-Specialist 
 
TREATMENT RESPONSE -Improved PERFORMED BY - Self NOTES   : 
 
 
I have spent 60 minutes of critical care time involved in lab review, consultations with specialist, family decision- making, bedside attention and documentation. During this entire length of time I was immediately available to the patient . Adrianna Vasquez MD 
 
 
Disposition: 
Admit Note: 
11:20 AM 
Pt is being admitted by Dr. Monica Edouard. The results of their tests and reason(s) for their admission have been discussed with pt and/or available family. They convey agreement and understanding for the need to be admitted and for admission diagnosis. PLAN: 
1. Current Discharge Medication List  
  
 
2. Follow-up Information None Return to ED if worse Diagnosis Clinical Impression:  
1. Intracranial hemorrhage ([address] 2. Acute respiratory failure with hypoxia ([address] 3. Encephalopathy

## 2019-11-20 NOTE — PROGRESS NOTES
Spiritual Care Assessment/Progress Note [address] 
 
 
NAME: Alvino Anderson      MRN: [id number] AGE: 80 y.o. SEX: male Anabaptism Affiliation: Buddhist  
Language: English  
 
11/20/2019     Total Time (in minutes): 20 Spiritual Assessment begun in [facility] EMERGENCY DEPT through conversation with: 
  
    []Patient        [x] Family    [] Friend(s) Reason for Consult: Request by staff Spiritual beliefs: (Please include comment if needed) [x] Identifies with a gladis tradition:     
   [x] Supported by a gladis community:  [address]      
   [] Claims no spiritual orientation:       
   [] Seeking spiritual identity:            
   [] Adheres to an individual form of spirituality:       
   [] Not able to assess:                   
 
    
Identified resources for coping:  
   [x] Prayer                           
   [] Music                  [] Guided Imagery [x] Family/friends                 [] Pet visits [] Devotional reading                         [] Unknown 
   [] Other:                                        
 
 
Interventions offered during this visit: (See comments for more details) Patient Interventions: Crisis Family/Friend(s): Affirmation of gladis, Affirmation of emotions/emotional suffering, Catharsis/review of pertinent events in supportive environment, End of life issues discussed, Iconic (affirming the presence of God/Higher Power), Anabaptism beliefs/image of God discussed, Normalization of emotional/spiritual concerns, Prayer (actual) Plan of Care: 
 
 [x] Support spiritual and/or cultural needs  
 [] Support AMD and/or advance care planning process [x] Support grieving process 
 [] Coordinate Rites and/or Rituals  
 [] Coordination with community clergy [] No spiritual needs identified at this time 
 [] Detailed Plan of Care below (See Comments)  [] Make referral to Music Therapy [] Make referral to Pet Therapy    
[] Make referral to Addiction services 
[] Make referral to [facility] 
[] Make referral to Spiritual Care Partner 
[] No future visits requested       
[x] Follow up visits as needed Comments:   Responded to page to ER to support family of patient. Patient's wife and son were present. Daughter lives in [place]. Patient prefers to go by name \"Donny\". Mrs Rebeca Wei shared about how they met and the wonderful life they shared. They are approaching their 63rd wedding anniversary. Son, Marta Mckeon, shared that his parents are deeply faithful people and never miss Sunday services. They are shocked by the events of this morning, but seem to be coping effectively. Provided ministry of presence, empathic listening and prayer. Offered assurance of ongoing availability of pastoral support. DARCY Price, [facility], Staff  [facility]  Paging Service  [phone])

## 2019-11-20 NOTE — CONSULTS
[address] [facility] Name:  Dorothy Rizvi 
MR#:  [id number] :  [id number] ACCOUNT #:  [de-identified] DATE OF SERVICE:  2019 REASON FOR CONSULTATION:  Intracerebral hemorrhage. HISTORY OF PRESENT ILLNESS:  The patient is an 51-year-old gentleman. He is on Eliquis for his atrial fibrillation as well as a DVT. He was in his normal state of health while yesterday he was driving, went to bed and last seen him at 10:00 p.m. When his wife came in this morning, found him sonorous respirations and unresponsive. A head CT was done, which showed a large left intracerebral hemorrhage. I was called for evaluation. Rest of the history is from the chart as he is unable to give me any history. PAST MEDICAL HISTORY:  Brain aneurysm untreated, atrial fibrillation, melanoma, prostate cancer, hyperglycemia, DVT. ALLERGIES:  NABUMETONE. 
 
SOCIAL HISTORY:  Former smoker. FAMILY HISTORY:  Diabetes, dementia, heart disease. REVIEW OF SYSTEMS:  Unable to obtain as the patient is unresponsive. PHYSICAL EXAMINATION: 
GENERAL:  An elderly gentleman who is lying in the bed. HEENT:  His head is normocephalic, atraumatic. His pupils are both postsurgical with minimal reaction. NEUROLOGIC:  He did not respond to verbal stimuli. Pain with stimuli. He has extensor posturing in both upper extremities. Rest of his neurologic exam is unable to be obtained secondary to his neurologic status. LABORATORY DATA:  CT scan shows a huge intracerebral hemorrhage on the left approximately 6 x 6 cm. There is hyperdensity in left temporal lobe in the middle of this which is consistent with a known left MCA aneurysm. IMPRESSION:  Intracerebral hemorrhage. RECOMMENDATIONS:  I had a long discussion with the family about this, they knew that the aneurysm did not want any heroic measures done.   I discussed with them that he is certainly in extremis and is already showing signs of brain stem injury. He is also on Eliquis, which is an irreversible blood thinner. At this point, I do not think this is survivable. We made him DNR. They would like hospice to discuss with him. Given his apneic pauses, I do not think this will be long. Liliane Ventura MD [id number] 
D:  11/20/2019 9:21 
T:  11/20/2019 12:43 JOB #:  [id number]

## 2019-11-20 NOTE — H&P
Oncology History and Physical 
 
Chief Complaint: Intracerebral hemorrhage ([facility] [address] [I61.9] HPI: Mr. 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 He was  initially referred in consultation to [facility] Larson by Dr. Adarsh Moreno with melanoma. Biopsy unfortunately revealed melanoma and he was referred to Dr. Orly Garvey. He underwent a wide excision of the right heel lesion and right inguinal sentinel node excision on 4/24/13, revealing a 1 cm malignant melanoma, maximum tumor thickness 2.11 mm with a benign sentinel node. He was staged asT3a N0, and later had re-resection on the right foot in 10/2014. The pathology report from 10/14/14 describes an approx 1 cm invasive malignant melanoma, 5 mm with positive deep margin, anatomic level IV with stage rpT4 noted. With this latter surgery he was referred to Dr. Steve Cheema. His PET CT from 11/28/14 showed 2 foci of abnormal soft tissue activity in the right foot/heel, suspicious residual/recurrent melanoma. Abnormal activity within a small right inguinal node, suspicious for tasha metastatic disease. No evidence of distant metastatic disease. He was referred to Dr. Baldemar Gil for evaluation for radiation therapy but Dr. Baldemar Gil believed that he would be better served by surgery and he subsequently underwent further resection with the finding of a 4 mm thick recurrence with negative sentinel node in the right inguinal region. He was followed conservatively and then was diagnosed with with metastatic melanoma in 12/2015 when he showed in-transit metastasis in the skin of the right lower leg, and PET/CT in January 2016 additionally showed skeletal lesions (bilateral ribs, left clavicle, right humerus, T11, left and right ilium, possible L4 and proximal left femur) and suspected involvement of the liver and left adrenal gland.  He started [place] in 1/2016. Additionally he started Eliquis in 2/2016 for a RLE DVT. Restaging CT 3/31/16  after 4 doses of Keytruda revealed multiple new pulmonary nodules in the right lung with the largest just above a centimeter in size, metastatic focus in left lobe of the liver measuring 10 mm, enlarged right inguinal node, second hyperdense right inguinal node. He  returned to the office with his wife to learn of the CT scan results and possible new therapy and followup for metastatic melanoma. He started on Opdivo and Yervoy and noticed dramatic improvement in the lesions about his lower right leg as well as the upper thigh. The tasha area has shrunk  and the leg is not as swollen. He developed a fairly significant itchy rash over her arms and a cough and Cee started him on prednisone 20 mg daily with dramatic improvement. The cough was just about gone and his rash much improved. He and his wife were quite satisfied with the quality of his response at that visit. CT scan of the chest, abdomen and pelvis on 7/6/16 revealed interval resolution of a 1.1 cm RLL pulmonary nodule, stable 5 mm linear density along minor fissure, and a 8 mm RLL dependent subpleural nodular density possible representing focal atleectais. 4.4 cm x 3.2 cm splenic lesion without change. Absent R kidney. 8 mm hyperdensity in the upper left hepatic lobe without change. He  was quite pleased with his response - the foot was no longer swollen. The cutaneous lesions on his right foot/ankle/thigh were considerably flatter and lighter. The mass in the upper thigh he could no longer feel. He had some pain in the SI joint. His heel felt sore and he favored it while walking. He became tired with his immunotherapy but rested and felt better. He was admitted to the hospital in July with diarrhea  and improved with IVF. He was discharged to be re-admitted in August with failure to thrive. He was found to have a low cortisol level and started on steroids which seem to have helped his fatigue. He returned to the office following  9 doses of nivolumab and was due to get dose 10. He was trying to come off as many of his medications as possible. He wondered about stopping the eliquis (prior DVT, still with active cancer). He had some swelling in the right ankle. He had pain in his right posterolateral hip that radiated to the right groin and radiated down the right leg. It was worse with walking. He underwent R hip x-ray that did not show an acute abnormality. PET CT scan on 11/10/16 revealed significant interval improvement in the appearance of the PET without evidence of residual or recurrent disease. He returned to the office after 20 doses and reported that he was doing well. He  described  pain in the left hip which has been going on for months. He previously had pain in the right hip which resolved. He needed a refill on the tramadol. PET CT on 4/21/17 revealed no significant hypermetabolism and no change. PET CT on 8/4/17 revealed no change with no foci of hypermetabolism. He returned to the office 10/13/17 and reported that he was gaining weight. With his pain gone, he was eating more. His legs look great. PET CT on 1/5/18 revealed no foci of hypermetabolism to suggest metastatic disease. No significant change. CTA from 2014 demonstrated an aneurysm in this area (vague area of increased density left temporal lobe). He then had an MRI of the brain done which revealed that his aneurysm had enlarged to 17 mm. He came back to the office 1/11/18 and stated  that in 2014 he saw a neurosurgeon who did not believe he would benefit from surgery for his intracranial aneurysm. 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   
 
He returned to the office 6/28/18 and reported that he did not intend to have anything done about his aneurysm. He was told that he has a 30% chance of rupture which he looks at as a 70% chance that it will not. No headache or blurred vision or other neurologic symptom. He developed pain in the right flank after riding his  about a week ago and would like a refill on his tramadol. PET CT on 7/20/18 was REDDY. He came back to the office 8/23/18 and reported that he had developed fairly painful arthritis in his wrists and hands. He had noted a bit of shortness with walking any distance. MRI of brain 9/12/18 described a stable left MCA territory intracranial aneurysm. No evidence of meta, hemorrhage, etc.    
 
He came back to the office 10/18/18 and reported that he was losing his skin color (getting autoimmune hypopigmentation/ vitiligo). He had a new chain saw but turned out not to be able to start. He saw Dr. Neelima Valadez about painful left hip. Dr. Neelima Valadez gave him a shot. PET CT 12/7/18 reported focal increased activity in loop of small bowel in upper pelvis - CT suggested for followup. Study unremarkable otherwise. He returned to the office 12/27/18 and reported that with each Xgeva injection, he had worsening joint and hip pain. The walker that he was using intermittently he was now using constantly. He got short winded easily. His family doctor had adjusted his amiodarone dose. His hips got very aggravated by the PET CT scan. He reported previously getting a steroid injection with Dr. Neelima Valadez. CT scan of the abdomen and pelvis on 1/23/19 which reported no evidence of intraluminal small bowel mass corresponding to FDG avidity seen on the PET CT from 1/27/18. S/p right nephrectomy and adrenalectmoy. No evidence of local recurrence. No lymphadenopathy in abd or pelvis. Stable peripheral splenic lesion. He came back to the office 3/7/19 and reported that he had fallen twice recently.  He saw Dr. Neelima Valadez and got a shot in his hip which helped considerably. He saw Dr. Mike Zamora. He saw Dr. Sugar Casey about his heart rhythm and it was \"better\" at present. He had scheduled followup for this shortly. He does not feel like he ever got back his strength on his right side since being in the hospital three times in November 2018 for high fever and pneumonia. He was unaware of any specific workup as to why he was currently prone to falls. MRI of the brain on 3/13/19 reported  no acute findings and no change in his aneurysm. He returned to the office 5/16/19 and reported that continued to stay congested. He uses a rollator in the house and a walking staff outside to assist his walking. PET CT on 8/2/19 showed no areas of hypermetabolism. He returned to the office 10/31/19 and reports that he has back pain and right hip pain which has been getting worse and Dr. Mike Zamora ordered physical therapy for him but he has worsened. Dr. Jackie Jane evaluated his hip and gave him a steroid injection for what  Diana Thurman thought was arthritis. Dr. Jackie Jane checked regular Xrays and told him he had arthritis in his back. His symptoms in the hip started 2-3 weeks ago. He feels like he might fall if he is not very careful. He did fall since his last visit and Dr. Jackie Jane told him the Xrays did not show any broken bones. He has been sleepier than normal. He took two tramadol and one tylenol yesterday. He is low on his tramadol and would like a new prescription. MRI of the lumbar spine on 11/13/19 reported new right paracentral disc extrusion with superior migration at L4-5 causing impingement right lateral recess. This appears to be a free fragment. Bone scan 11/13/19 did not have a pattern of bony metastatic disease. He presented to the ER with severely altered mental status. Patient's last known well time was 10 PM he was in his normal state of health. He ate very well yesterday and enjoyed oysters.   This morning, his wife found patient with sonorous respirations and he could not be roused. 911 was called. Blood sugars were reportedly in the 200 range. EMS reports no recent illnesses or change in medications. History is severely limited by patient's altered mental status. No reported other associated symptoms. No other reported other exacerbating or militating factors. CT scan of the head reported Large left thalamic/parietal intraparenchymal hematoma with 
associated edema. Separate 2.0 cm hypodensity at the left temporal lobe is likely related to a large aneurysm. Significant subfalcine herniation and transtentorial herniation with trapping of the right lateral ventricle. Intraventricular hemorrhage. He is actively dying at present with periods of apnea. Multiple family members and friends in attendance. Patient Active Problem List  
 Diagnosis Date Noted  Intracerebral hemorrhage ([address] 11/20/2019  Type 2 diabetes with nephropathy ([address] 11/15/2018  Acute respiratory failure with hypoxia ([address] 11/09/2018  Adrenal insufficiency (Colfax's disease) ([address] 11/04/2018  Hyponatremia 11/04/2018  Generalized weakness 11/04/2018  Small bowel obstruction ([address] 07/13/2017  Diabetes mellitus, stable ([address] 07/13/2017  Middle cerebral artery aneurysm 07/13/2017  Hyperglycemia 07/13/2017  Pure hyperglyceridemia 07/13/2017  Malignant neoplasm of prostate ([address] 07/13/2017  Left hip pain 07/13/2017  Adrenal insufficiency ([address] 08/16/2016  Pneumonia 08/10/2016  CHF (congestive heart failure) ([address] 08/09/2016  Gastroenteritis 07/31/2016  Dehydration 07/31/2016  ARF (acute renal failure) ([address] 07/31/2016  Melanoma ([address] 01/13/2015  A-fib ([address] 09/20/2012  Back pain 09/20/2012 Past Medical History:  
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 hodges, radiologist  
 Atrial flutter [facility])   
 s/p cardioversion 2/2019  Cancer [facility]) 2013 LEFT FOOT ,MELANOMA and right heel  Cancer ([address] 1980 RIGHT KIDNEY  
 Cancer [facility]) 1988 PROSTATE  TREATED WITH RADIATION  Chronic pain BACK AND RIGHT HIP  
 Diabetes mellitus, stable ([address] 7/13/2017 Steroid induced  Hyperglycemia 7/13/2017 Steroid induced: follow up A1C, BMP, discontinue Starlix in wnl  LBP (low back pain)  Left hip pain 7/13/2017 Xray shows no obvious Fx or Metastatic lesions, this pain could be arthritic in nature or radicular, will give a corticosteroid injection, if no benefit may need Ortho eval  
 Malignant neoplasm of prostate ([address] 7/13/2017  OA (osteoarthritis) spine  Prostate cancer ([address] 1990s  Pure hyperglyceridemia 7/13/2017  Renal cell cancer (right)  Small bowel obstruction ([address] 7/13/2017  Thromboembolus ([address] 2015  
 right leg Past Surgical History:  
Procedure Laterality Date [address] [facility] 635 MITRAL VALVE REPAIR  
 [address]  HX CATARACT REMOVAL Bilateral 2011  HX LUMBAR LAMINECTOMY  1990  
 HX ORTHOPAEDIC    
 CARPAL TUNNEL-SERAFIN  HX OTHER SURGICAL  OCT 2014 MELANOMA REMOVED FROM HEEL  
 [address] [facility] TURP - prostate not removed due to location of tumor [address] NEPHRECTOMY  HX VASCULAR ACCESS Left 2016  
 port-a-cath  PA COLSC FLX W/REMOVAL LESION BY HOT BX FORCEPS  9/24/2012  PA SIGMOIDOSCOPY FLX DX W/COLLJ SPEC BR/WA IF PFRMD  9/21/2012 Current Facility-Administered Medications Medication Dose Route Frequency  iopamidol (ISOVUE-370) 76 % injection 150 mL  150 mL IntraVENous RAD ONCE  
 sodium chloride (NS) flush 10 mL  10 mL IntraVENous RAD ONCE Current Outpatient Medications Medication Sig  furosemide (LASIX) 20 mg tablet TAKE 1 TABLET BY MOUTH EVERY DAY  nateglinide (STARLIX) 60 mg tablet TAKE 1 TAB BY MOUTH BEFORE BREAKFAST, LUNCH, AND DINNER.  amiodarone (CORDARONE) 200 mg tablet Take two (2) tablets by mouth daily (Patient taking differently: 200 mg every evening. Take two (2) tablets by mouth daily)  traMADol (ULTRAM) 50 mg tablet Take 25-50 mg by mouth every eight (8) hours as needed for Pain.  calcium carbonate (CALCIUM 600) 600 mg calcium (1,500 mg) tablet Take 600 mg by mouth daily.  hydrocortisone (CORTEF) 10 mg tablet TAKE 1 TABLET BY MOUTH TWICE A DAY. now daily  PREVIDENT 5000 BOOSTER PLUS 1.1 % pste BRUSH AFTER BREAKFAST AND SUPPER CAN ALSO USE IN FLUORIDE TRAY  bimatoprost (LUMIGAN) 0.01 % ophthalmic drops Administer 1 Drop to both eyes nightly.  cholecalciferol (VITAMIN D3) 1,000 unit tablet Take 1,000 Units by mouth daily.  apixaban (ELIQUIS) 5 mg tablet Take 2.5 mg by mouth two (2) times a day.  acetaminophen (TYLENOL EXTRA STRENGTH) 500 mg tablet Take 500 mg by mouth every six (6) hours as needed for Pain. Allergies Allergen Reactions  Nabumetone Itching and Hives Social History Tobacco Use  Smoking status: Former Smoker Packs/day: 0.25 Years: 2.00 Pack years: 0.50 Last attempt to quit: 1953 Years since quittin.6  Smokeless tobacco: Never Used Substance Use Topics  Alcohol use: No  
  
Family History Problem Relation Age of Onset  Diabetes Father  Dementia Mother  Cancer Sister BRAIN  
 Heart Disease Brother  Arthritis-osteo Sister  Anesth Problems Neg Hx   
  
 
ROS not obtainable Physical Exam: 
Constitutional  Appears close to chronological age. Well nourished. Well developed. Head Normocephalic; no scars Eyes Eyes closed ENMT Sinuses are nontender. Neck Supple without masses or thyromegaly. No jugular venous distension. Hematologic/Lymphatic No petechiae or purpura. No tender or palpable lymph nodes noted. Respiratory Coarse breath sounds Cardiovascular Regular rate and rhythm of heart Chest / Line Site Chest is symmetric with no chest wall deformities. Abdomen Non-tender, non-distended, no masses, ascites or hepatosplenomegaly. Good bowel sounds. Musculoskeletal No tenderness or swelling Extremities No visible deformities, no cyanosis, clubbing or edema. Skin Areas of hyperpigmentation - flat- right lower extremity - prior melanoma sites. Neurologic Intermittently posturing. Psychiatric Obtunded. Labs: CBC: 11/20/2019: HCT 41.4 % (Ref range: 36.6 - 50.3 %); HGB 12.9 g/dL (Ref range: 12.1 - 17.0 g/dL); PLATELET 987 K/uL (Ref range: 150 - 400 K/uL); RBC 4.31 M/uL (Ref range: 4.10 - 5.70 M/uL); WBC 14.5 K/uL* (Ref range: 4.1 - 11.1 K/uL) BMP: 11/20/2019: BUN 26 MG/DL* (Ref range: 6 - 20 MG/DL); Calcium 8.2 MG/DL* (Ref range: 8.5 - 10.1 MG/DL); Chloride 108 mmol/L (Ref range: 97 - 108 mmol/L); CO2 22 mmol/L (Ref range: 21 - 32 mmol/L); Creatinine 1.12 MG/DL (Ref range: 0.70 - 1.30 MG/DL); Glucose 176 mg/dL* (Ref range: 65 - 100 mg/dL); Potassium 4.0 mmol/L (Ref range: 3.5 - 5.1 mmol/L); Sodium 138 mmol/L (Ref range: 136 - 145 mmol/L) Liver: 11/20/2019: Albumin 3.5 g/dL (Ref range: 3.5 - 5.0 g/dL); Alk. phosphatase 65 U/L (Ref range: 45 - 117 U/L); AST (SGOT) 15 U/L (Ref range: 15 - 37 U/L); Protein, total 6.6 g/dL (Ref range: 6.4 - 8.2 g/dL) Impression and Plan: 
Intracerebral hemorrhage - he is herniating and it is apparent that he is actively dying. I will admit and try to keep as comfortable as possible. Have discussed with Dr. Alexis Judge and the nursing supervisor. I believe that the cerebral aneurysm which we have known about for years is the most likely culprit here - he has lived with it for years given the 30% risk of procedure related catastrophe and his underlying metastatic malignancy. Metastatic melanoma since January 2016 - s/p multiple years of pembrolizumab Janiya Curran MD FACP [address] [address] office 
[address] [place], [address] Phone [phone] Fax [phone]

## 2019-11-21 NOTE — PROGRESS NOTES
1700: upon entering room, patient visibly in respiratory distress with thick secretions in his mouth. Robinul and Scopolamine patch administered with no effect. Morphine also given for increased labored breathing. RN used yaunker to suction what was visible. RT paged to deep suction x3. Attempted to page Dr Negro Condon multiple times with no success. Message left for Dr Negro Condon or On Call to return the call ASAP. 1715: RT at bedside suctioning patient. Continued to attempt to page an On Call MD. Another voicemail and callback number left for a return page and also attempted to reach MD through perfect serve with no success. Patient still unable to control thick secretions. Contributing to attempt to suction patient for relief at this time. 1730: Another voicemail left from On Call MD at the office number from another RN. 1800: Aleksandra Boucher returned the page stating she was on call for Dr Negro Condon. The situation was explained and requested comfort orders for the patient at this time due to him declining and appeared to be in distress. Dr Alaina Ayers concerned that increasing meds would expedite the death process and didn't feel comfortable ordering without family consent. 1815: Pts wife and son spoke to MD over the phone and both agreed with MD on comfort measures.  Orders received from MD.

## 2019-11-21 NOTE — PROGRESS NOTES
Pt admitted from Observation Unit to Oncology Unit, family with pt and oriented to room, and made comfortable. Pt suctioned orally with moderate tan secretions obtained, pt unresponsive except for gag reflex, HOB is elevated, pt has tachypneic respirations-- will give meds and make family aware of side effects.

## 2019-11-21 NOTE — DISCHARGE SUMMARY
Oncology Discharge Summary Discharge Diagnoses: Intracerebral hemorrhage ([facility] [address] [I61.9] Problem List:  Active Problems: 
  Intracerebral hemorrhage ([facility] [address] (2019) Hospital Course: Anya Dougherty is a 80 y.o. admitted on 2019 for Intracerebral hemorrhage ([facility] [address] [I61.9]. He was unable to be aroused by his wife the Am of admission and was brought to the hospital by EMS with profoundly altered mental status. CT scan confirmed massive cerebral hemorrhage likely from his previously known cerebral aneurysm. He was seen in the ER by Dr. Gina Donald who confirmed that there was no intervention possible to reverse his course. In the ER he was noted to have intermittent periods of apnea and was actively dying. He was admitted and moved to the oncology floor so that he and his family could have a more private peaceful setting for his death. He was given a variety of medications to try to deal with his secretions and had morphine ordered if he had any pain. He  at 22:53 PM 19 surrounded by loving family members. For all details of his physical exam, medications, family and social history, allergies, etc, please see admitting H&P done earlier the same day (he passed away the same day as admission). Imaging: CT head reported the following Examination demonstrates a 6.0 x 10.1 x 5.6 cm (volume 212 mL). Left 
thalamic/parietal intraparenchymal hematoma with associated edema. Separate 2.0 
cm hypodensity in the left temporal lobe raises the possibility of a large 
aneurysm. This is causing significant subfalcine herniation of approximately 16 
mm and trapping of the right lateral ventricle. There is also transtentorial 
herniation with effacement of the basilar cisterns. Intraventricular hemorrhage 
is noted. Labs:   
 
Recent Results (from the past 24 hour(s)) CBC WITH AUTOMATED DIFF Collection Time: 19  8:48 AM  
Result Value Ref Range WBC 14.5 (H) 4.1 - 11.1 K/uL RBC 4.31 4.10 - 5.70 M/uL  
 HGB 12.9 12.1 - 17.0 g/dL HCT 41.4 36.6 - 50.3 % MCV 96.1 80.0 - 99.0 FL  
 MCH 29.9 26.0 - 34.0 PG  
 MCHC 31.2 30.0 - 36.5 g/dL  
 RDW 13.3 11.5 - 14.5 % PLATELET 499 820 - 836 K/uL MPV 12.8 8.9 - 12.9 FL  
 NRBC 0.0 0  WBC ABSOLUTE NRBC 0.00 0.00 - 0.01 K/uL NEUTROPHILS 79 (H) 32 - 75 % LYMPHOCYTES 9 (L) 12 - 49 % MONOCYTES 7 5 - 13 % EOSINOPHILS 4 0 - 7 % BASOPHILS 1 0 - 1 % IMMATURE GRANULOCYTES 0 0.0 - 0.5 % ABS. NEUTROPHILS 11.5 (H) 1.8 - 8.0 K/UL  
 ABS. LYMPHOCYTES 1.3 0.8 - 3.5 K/UL  
 ABS. MONOCYTES 1.1 (H) 0.0 - 1.0 K/UL  
 ABS. EOSINOPHILS 0.6 (H) 0.0 - 0.4 K/UL  
 ABS. BASOPHILS 0.1 0.0 - 0.1 K/UL  
 ABS. IMM. GRANS. 0.1 (H) 0.00 - 0.04 K/UL  
 DF AUTOMATED METABOLIC PANEL, COMPREHENSIVE Collection Time: 11/20/19  8:48 AM  
Result Value Ref Range Sodium 138 136 - 145 mmol/L Potassium 4.0 3.5 - 5.1 mmol/L Chloride 108 97 - 108 mmol/L  
 CO2 22 21 - 32 mmol/L Anion gap 8 5 - 15 mmol/L Glucose 176 (H) 65 - 100 mg/dL BUN 26 (H) 6 - 20 MG/DL Creatinine 1.12 0.70 - 1.30 MG/DL  
 BUN/Creatinine ratio 23 (H) 12 - 20 GFR est AA >60 >60 ml/min/1.73m2 GFR est non-AA >60 >60 ml/min/1.73m2 Calcium 8.2 (L) 8.5 - 10.1 MG/DL Bilirubin, total 0.5 0.2 - 1.0 MG/DL  
 ALT (SGPT) 18 12 - 78 U/L  
 AST (SGOT) 15 15 - 37 U/L Alk. phosphatase 65 45 - 117 U/L Protein, total 6.6 6.4 - 8.2 g/dL Albumin 3.5 3.5 - 5.0 g/dL Globulin 3.1 2.0 - 4.0 g/dL A-G Ratio 1.1 1.1 - 2.2 PROTHROMBIN TIME + INR Collection Time: 11/20/19  8:48 AM  
Result Value Ref Range INR 1.0 0.9 - 1.1 Prothrombin time 10.6 9.0 - 11.1 sec Signed: Shamika Baum MD 
11/21/2019 
8:31 AM 
 
 
Hayden Ahuja MD FACP [address] [address] office 
[address] [place], [address] Phone [phone] Fax [phone]

## 2019-11-21 NOTE — PROGRESS NOTES
65 Pt with agonal respirations, family at bedside and are aware of pt's declining status with acceptance. 2253 Pt not breathing/no heart beat, family accepts pt's passing. Supervisor notified and  called. Supervisor calling MD to pronounce. 1310 Bekah Larson in room seeing family. Family requests that upper denture stay in pt's mouth for  home. 2735 IV's d/c'ed with cannulas intact, pt placed in body bag with appropriate identification to toe, belongings (socks) and outside of bag.

## 2019-11-21 NOTE — PROGRESS NOTES
Called to Pronounce Death Patient  86yo in the hospital due to  [address] 
 
 
PE: BP  00/00     HR 00     RR 00 HEENT: Pupils fixed, dilated, no response to light, no doll's eyes Heart: no heart sounds Lungs: no breathing sounds Abdomen: no bowel sounds Extremities: no pulses Neuro: GCS 3, no DTR, no doll's eyes. Yuri  on 19   At 22:53 D/C Summary to be Dictated by Attending Physician. Dr. Alix Hilliard Hospitalist.

## 2019-11-21 NOTE — PROGRESS NOTES
COU End of Shift Note Bedside shift change report given to [facility], RN (incoming nurse) by Jelly Potter (outgoing nurse) on Elfrieda Lutes. Report included the following information SBAR, Kardex, Procedure Summary, Intake/Output, MAR, Accordion and Recent Results. Shift Summary: see previous note. Family still at bedside. Issues for Physician to Address:    
 
Patient on Cardiac Monitoring? [] Yes 
[x] No 
 
Rhythm:   
 
 
 
Shift Events Jelly Potter

## 2019-11-21 NOTE — PROGRESS NOTES
Spiritual Care Assessment/Progress Note [address] 
 
 
NAME: Janine Smith      MRN: [id number] AGE: 80 y.o. SEX: male Jain Affiliation: Alevism  
Language: English  
 
11/20/2019     Total Time (in minutes): 15 Spiritual Assessment begun in [facility] through conversation with: 
  
    []Patient        [x] Family    [] Friend(s) Reason for Consult: Death, Inpatient Spiritual beliefs: (Please include comment if needed) [x] Identifies with a gladis tradition:     
   [x] Supported by a gladis community:        
   [] Claims no spiritual orientation:       
   [] Seeking spiritual identity:            
   [] Adheres to an individual form of spirituality:       
   [] Not able to assess:                   
 
    
Identified resources for coping:  
   [x] Prayer                           
   [] Music                  [] Guided Imagery [x] Family/friends                 [] Pet visits [] Devotional reading                         [] Unknown 
   [] Other:                                       
 
 
Interventions offered during this visit: (See comments for more details) Patient Interventions: Crisis Family/Friend(s): Affirmation of gladis, Affirmation of emotions/emotional suffering, Iconic (affirming the presence of God/Higher Power), Normalization of emotional/spiritual concerns, Prayer (assurance of), Jain beliefs/image of God discussed, Guidance concerning next steps/process to be expected Plan of Care: 
 
 [] Support spiritual and/or cultural needs  
 [] Support AMD and/or advance care planning process [x] Support grieving process 
 [] Coordinate Rites and/or Rituals  
 [] Coordination with community clergy [] No spiritual needs identified at this time 
 [] Detailed Plan of Care below (See Comments)  [] Make referral to Music Therapy 
[] Make referral to Pet Therapy    
[] Make referral to Addiction services [] Make referral to [facility] 
[] Make referral to Spiritual Care Partner 
[] No future visits requested       
[] Follow up visits as needed Comments:   Responded to page to Oncology when patient . Patient's wife and son were present. Son, Darwin Johnson, shared that his sister is en route from [place]. Mrs. Charla Dutton engaged in life review sharing how easily patient made friends. His gladis was very important to him and he loved his family. She expressed gratefulness to God for patient's peaceful transition and voiced that they had so much to be thankful for. Family is coping well at this time. Provided ministry of presence and assurance of prayer. DANNI Roblero in [place] will handle  arrangements. DARCY Mtz, [facility], Staff  [facility]  Paging Service  [phone])
